# Patient Record
Sex: MALE | Race: WHITE | Employment: PART TIME | URBAN - NONMETROPOLITAN AREA
[De-identification: names, ages, dates, MRNs, and addresses within clinical notes are randomized per-mention and may not be internally consistent; named-entity substitution may affect disease eponyms.]

---

## 2017-03-15 ENCOUNTER — OFFICE VISIT (OUTPATIENT)
Dept: FAMILY MEDICINE CLINIC | Age: 27
End: 2017-03-15

## 2017-03-15 VITALS
BODY MASS INDEX: 44.1 KG/M2 | SYSTOLIC BLOOD PRESSURE: 122 MMHG | HEART RATE: 116 BPM | WEIGHT: 315 LBS | OXYGEN SATURATION: 98 % | HEIGHT: 71 IN | DIASTOLIC BLOOD PRESSURE: 85 MMHG

## 2017-03-15 DIAGNOSIS — J01.00 ACUTE NON-RECURRENT MAXILLARY SINUSITIS: Primary | ICD-10-CM

## 2017-03-15 DIAGNOSIS — F52.32 ANORGASMIA OF MALE: ICD-10-CM

## 2017-03-15 PROCEDURE — 99213 OFFICE O/P EST LOW 20 MIN: CPT | Performed by: NURSE PRACTITIONER

## 2017-03-15 RX ORDER — AMOXICILLIN 500 MG/1
500 CAPSULE ORAL 2 TIMES DAILY
Qty: 20 CAPSULE | Refills: 0 | Status: SHIPPED | OUTPATIENT
Start: 2017-03-15 | End: 2017-03-25

## 2017-03-21 ASSESSMENT — ENCOUNTER SYMPTOMS
EYES NEGATIVE: 1
GASTROINTESTINAL NEGATIVE: 1
RESPIRATORY NEGATIVE: 1

## 2017-04-27 ENCOUNTER — OFFICE VISIT (OUTPATIENT)
Dept: FAMILY MEDICINE CLINIC | Age: 27
End: 2017-04-27

## 2017-04-27 VITALS
HEIGHT: 71 IN | HEART RATE: 113 BPM | BODY MASS INDEX: 44.1 KG/M2 | WEIGHT: 315 LBS | OXYGEN SATURATION: 98 % | DIASTOLIC BLOOD PRESSURE: 110 MMHG | SYSTOLIC BLOOD PRESSURE: 130 MMHG

## 2017-04-27 DIAGNOSIS — R03.0 ELEVATED BLOOD PRESSURE (NOT HYPERTENSION): ICD-10-CM

## 2017-04-27 DIAGNOSIS — E66.01 MORBID OBESITY WITH BMI OF 45.0-49.9, ADULT (HCC): Primary | ICD-10-CM

## 2017-04-27 PROCEDURE — 99212 OFFICE O/P EST SF 10 MIN: CPT | Performed by: NURSE PRACTITIONER

## 2017-04-27 ASSESSMENT — ENCOUNTER SYMPTOMS
RESPIRATORY NEGATIVE: 1
GASTROINTESTINAL NEGATIVE: 1
EYES NEGATIVE: 1

## 2017-04-28 ENCOUNTER — TELEPHONE (OUTPATIENT)
Dept: BARIATRICS/WEIGHT MGMT | Age: 27
End: 2017-04-28

## 2017-10-18 ENCOUNTER — OFFICE VISIT (OUTPATIENT)
Dept: FAMILY MEDICINE CLINIC | Age: 27
End: 2017-10-18

## 2017-10-18 VITALS
HEART RATE: 102 BPM | HEIGHT: 71 IN | SYSTOLIC BLOOD PRESSURE: 116 MMHG | TEMPERATURE: 98.2 F | WEIGHT: 279 LBS | BODY MASS INDEX: 39.06 KG/M2 | RESPIRATION RATE: 18 BRPM | OXYGEN SATURATION: 97 % | DIASTOLIC BLOOD PRESSURE: 82 MMHG

## 2017-10-18 DIAGNOSIS — E66.9 OBESITY (BMI 35.0-39.9 WITHOUT COMORBIDITY): ICD-10-CM

## 2017-10-18 DIAGNOSIS — N39.43 URINARY DRIBBLING: Primary | ICD-10-CM

## 2017-10-18 DIAGNOSIS — Z20.2 STD EXPOSURE: ICD-10-CM

## 2017-10-18 DIAGNOSIS — Z79.899 HIGH RISK MEDICATION USE: ICD-10-CM

## 2017-10-18 DIAGNOSIS — Z72.0 TOBACCO USE: ICD-10-CM

## 2017-10-18 DIAGNOSIS — R39.11 URINARY HESITANCY: ICD-10-CM

## 2017-10-18 DIAGNOSIS — H53.8 BLURRED VISION, BILATERAL: ICD-10-CM

## 2017-10-18 DIAGNOSIS — R73.9 HYPERGLYCEMIA: ICD-10-CM

## 2017-10-18 PROCEDURE — 99173 VISUAL ACUITY SCREEN: CPT | Performed by: NURSE PRACTITIONER

## 2017-10-18 PROCEDURE — 99213 OFFICE O/P EST LOW 20 MIN: CPT | Performed by: NURSE PRACTITIONER

## 2017-10-18 PROCEDURE — 36415 COLL VENOUS BLD VENIPUNCTURE: CPT | Performed by: NURSE PRACTITIONER

## 2017-10-18 RX ORDER — BUSPIRONE HYDROCHLORIDE 5 MG/1
5 TABLET ORAL DAILY
COMMUNITY
Start: 2017-10-18 | End: 2018-08-22

## 2017-10-18 RX ORDER — ASENAPINE MALEATE 5 MG/1
TABLET SUBLINGUAL
COMMUNITY
Start: 2017-09-25 | End: 2018-08-22

## 2017-10-18 ASSESSMENT — PATIENT HEALTH QUESTIONNAIRE - PHQ9
SUM OF ALL RESPONSES TO PHQ9 QUESTIONS 1 & 2: 0
SUM OF ALL RESPONSES TO PHQ QUESTIONS 1-9: 0
1. LITTLE INTEREST OR PLEASURE IN DOING THINGS: 0
2. FEELING DOWN, DEPRESSED OR HOPELESS: 0

## 2017-10-18 ASSESSMENT — ENCOUNTER SYMPTOMS
GASTROINTESTINAL NEGATIVE: 1
RESPIRATORY NEGATIVE: 1
EYES NEGATIVE: 1

## 2017-10-18 NOTE — PROGRESS NOTES
catheter in place. Prior to this event voiding symptoms consisted of hesitancy. Prior treatments include watchful waiting. Recent medications that may have affected his voiding include saphris. Review of Systems   Constitutional: Positive for weight loss. HENT: Negative. Eyes: Negative. Respiratory: Negative. Cardiovascular: Negative. Gastrointestinal: Negative. Genitourinary: Negative. Urinary dribbling, urinary hesitancy and urinary retention   Musculoskeletal: Negative. Skin: Negative. Neurological: Negative. Endo/Heme/Allergies: Negative. Psychiatric/Behavioral: Negative. All other systems reviewed and are negative.        Past Medical History:   Diagnosis Date    ADHD     Adult respiratory distress syndrome ([facility] [address]     ICU     Anxiety     Bipolar disorder     Cognitive disorder     GERD (gastroesophageal reflux disease)     Hypertension     Kidney stones     MR (mental retardation), moderate     Nausea and vomiting     Pneumococcal pneumonia (HCC)     Seizures (HCC)     pt denied any seizures ever     Family History   Problem Relation Age of Onset    Depression Mother     Mental Illness Maternal Uncle     Arthritis Paternal Grandmother      Past Surgical History:   Procedure Laterality Date    MOUTH SURGERY      MOUTH SURGERY       Social History     Social History    Marital status: Single     Spouse name: N/A    Number of children: 0    Years of education: 15     Occupational History    works on farm      Farm work     Social History Main Topics    Smoking status: Heavy Tobacco Smoker     Packs/day: 0.50     Years: 10.00     Types: Cigarettes    Smokeless tobacco: Current User     Types: Chew    Alcohol use 1.2 oz/week     2 Cans of beer per week      Comment: Occasional, \"2x last month\"    Drug use: No      Comment: pot one year ago    Sexual activity: Yes     Partners: Female     Other Topics Concern    Not on file     Social History Narrative    No narrative on file     Current Outpatient Prescriptions   Medication Sig Dispense Refill    SAPHRIS 5 MG SUBL sublingual tablet       busPIRone (BUSPAR) 10 MG tablet Take 2 tablets by mouth 3 times daily 180 tablet 0     No current facility-administered medications for this visit. Objective:       Physical Exam   Constitutional: He is oriented to person, place, and time. He appears well-developed and well-nourished. No distress. Obese   HENT:   Head: Normocephalic and atraumatic. Right Ear: Tympanic membrane, external ear and ear canal normal.   Left Ear: Tympanic membrane, external ear and ear canal normal.   Nose: Nose normal.   Mouth/Throat: Oropharynx is clear and moist and mucous membranes are normal.   Eyes: Conjunctivae, EOM and lids are normal. Pupils are equal, round, and reactive to light. Neck: Normal range of motion. Neck supple. No JVD present. Carotid bruit is not present. No thyromegaly present. Cardiovascular: Normal rate, regular rhythm, normal heart sounds and normal pulses. Exam reveals no gallop and no friction rub. No murmur heard. Pulses:       Radial pulses are 2+ on the right side, and 2+ on the left side. Dorsalis pedis pulses are 2+ on the right side, and 2+ on the left side. Posterior tibial pulses are 2+ on the right side, and 2+ on the left side. Pulmonary/Chest: Effort normal and breath sounds normal.   Abdominal: Soft. Normal appearance and bowel sounds are normal. He exhibits no mass. There is no hepatosplenomegaly. There is no tenderness. Musculoskeletal: Normal range of motion. He exhibits no edema.   + right knee crepitus     Lymphadenopathy:        Head (right side): No submandibular adenopathy present. Head (left side): No submandibular adenopathy present. He has no cervical adenopathy. Neurological: He is alert and oriented to person, place, and time. He has normal strength.  Gait normal.   Skin: Skin is warm, dry and intact. No lesion and no rash noted. Psychiatric: He has a normal mood and affect. His speech is normal and behavior is normal. Judgment and thought content normal. Cognition and memory are normal.   Nursing note and vitals reviewed. /82 (Site: Left Arm, Position: Sitting, Cuff Size: Large Adult)   Pulse 102   Temp 98.2 °F (36.8 °C) (Temporal)   Resp 18   Ht 5' 10.98\" (1.803 m)   Wt 279 lb (126.6 kg)   SpO2 97%   BMI 38.93 kg/m²   Body mass index is 38.93 kg/m². BP Readings from Last 2 Encounters:   10/18/17 116/82   04/27/17 (!) 130/110       Wt Readings from Last 3 Encounters:   10/18/17 279 lb (126.6 kg)   04/27/17 (!) 327 lb 12.8 oz (148.7 kg)   03/15/17 (!) 328 lb 3.2 oz (148.9 kg)       Lab Review   No visits with results within 2 Month(s) from this visit.    Latest known visit with results is:   Admission on 02/03/2017, Discharged on 02/03/2017   Component Date Value    Ventricular Rate 02/07/2017 104     Atrial Rate 02/07/2017 104     P-R Interval 02/07/2017 136     QRS Duration 02/07/2017 78     Q-T Interval 02/07/2017 334     QTc Calculation (Bazett) 02/07/2017 439     P Axis 02/07/2017 55     R Axis 02/07/2017 92     T Axis 02/07/2017 -12     Diagnosis 02/07/2017                      Value:Sinus tachycardia  Rightward axis  Nonspecific T wave abnormality  Abnormal ECG  When compared with ECG of 11-MAY-2016 14:54,  Nonspecific T wave abnormality  Confirmed by [facility] LITTLEJOHN MD ([id number]) on 2/4/2017 8:12:56 AM      Amphetamine Screen, Urine 02/03/2017 Neg     Barbiturate Screen, Ur 02/03/2017 Neg     Benzodiazepine Screen, U* 02/03/2017 Neg     Cannabinoid Scrn, Ur 02/03/2017 Neg     COCAINE METABOLITE SCREE* 02/03/2017 Neg     Opiate Scrn, Ur 02/03/2017 Neg     PCP Scrn, Ur 02/03/2017 Neg     Methadone Screen, Urine 02/03/2017 Neg     Propoxyphene Scrn, Ur 02/03/2017 Neg     pH, UA 02/03/2017 7.5     Drug Screen Comment: 02/03/2017 see below     acupuncture are considered. The patient is asked to consider setting a firm date to stop smoking/using tobacco products. Printed references are provided to the patient. High risk medication use  -     Comprehensive Metabolic Panel          Patient has been instructed call the office immediately with new symptoms, change in symptoms or worsening of symptoms. If this is not feasible, patient is instructed to report to the emergency room. Medication profile reviewed. Medication side effects and possible impairments from medications were discussed as applicable. Allergies were reviewed. Health maintenance was reviewed and updated as appropriate.

## 2017-10-19 LAB
A/G RATIO: 1.5 (ref 1.1–2.2)
ALBUMIN SERPL-MCNC: 4.6 G/DL (ref 3.4–5)
ALP BLD-CCNC: 73 U/L (ref 40–129)
ALT SERPL-CCNC: 47 U/L (ref 10–40)
ANION GAP SERPL CALCULATED.3IONS-SCNC: 20 MMOL/L (ref 3–16)
AST SERPL-CCNC: 52 U/L (ref 15–37)
BILIRUB SERPL-MCNC: 1.8 MG/DL (ref 0–1)
BUN BLDV-MCNC: 14 MG/DL (ref 7–20)
CALCIUM SERPL-MCNC: 9.9 MG/DL (ref 8.3–10.6)
CHLORIDE BLD-SCNC: 101 MMOL/L (ref 99–110)
CO2: 22 MMOL/L (ref 21–32)
CREAT SERPL-MCNC: 0.9 MG/DL (ref 0.9–1.3)
ESTIMATED AVERAGE GLUCOSE: 93.9 MG/DL
GFR AFRICAN AMERICAN: >60
GFR NON-AFRICAN AMERICAN: >60
GLOBULIN: 3.1 G/DL
GLUCOSE BLD-MCNC: 78 MG/DL (ref 70–99)
HBA1C MFR BLD: 4.9 %
POTASSIUM SERPL-SCNC: 4.2 MMOL/L (ref 3.5–5.1)
SODIUM BLD-SCNC: 143 MMOL/L (ref 136–145)
TOTAL PROTEIN: 7.7 G/DL (ref 6.4–8.2)
TSH SERPL DL<=0.05 MIU/L-ACNC: 1.1 UIU/ML (ref 0.27–4.2)

## 2017-10-20 ENCOUNTER — TELEPHONE (OUTPATIENT)
Dept: FAMILY MEDICINE CLINIC | Age: 27
End: 2017-10-20

## 2017-11-02 ENCOUNTER — TELEPHONE (OUTPATIENT)
Dept: FAMILY MEDICINE CLINIC | Age: 27
End: 2017-11-02

## 2017-11-02 NOTE — TELEPHONE ENCOUNTER
Patient can be put in for an acute appointment next week for ankle pain only.  Educated patient and I will not be able to address other concerns at this visit

## 2017-11-06 ENCOUNTER — OFFICE VISIT (OUTPATIENT)
Dept: FAMILY MEDICINE CLINIC | Age: 27
End: 2017-11-06

## 2017-11-06 VITALS
BODY MASS INDEX: 38.39 KG/M2 | OXYGEN SATURATION: 98 % | WEIGHT: 268.2 LBS | SYSTOLIC BLOOD PRESSURE: 130 MMHG | HEIGHT: 70 IN | DIASTOLIC BLOOD PRESSURE: 80 MMHG | HEART RATE: 94 BPM

## 2017-11-06 DIAGNOSIS — G89.29 CHRONIC PAIN OF RIGHT ANKLE: Primary | ICD-10-CM

## 2017-11-06 DIAGNOSIS — R29.898 ANKLE WEAKNESS: ICD-10-CM

## 2017-11-06 DIAGNOSIS — M25.571 CHRONIC PAIN OF RIGHT ANKLE: Primary | ICD-10-CM

## 2017-11-06 DIAGNOSIS — M25.471 RIGHT ANKLE SWELLING: ICD-10-CM

## 2017-11-06 DIAGNOSIS — Z71.6 TOBACCO ABUSE COUNSELING: ICD-10-CM

## 2017-11-06 PROCEDURE — 99213 OFFICE O/P EST LOW 20 MIN: CPT | Performed by: NURSE PRACTITIONER

## 2017-11-06 ASSESSMENT — ENCOUNTER SYMPTOMS
COLOR CHANGE: 1
RESPIRATORY NEGATIVE: 1

## 2017-11-06 NOTE — PROGRESS NOTES
swelling (Right ankle) and myalgias (Right ankle). Negative for neck pain and neck stiffness. Skin: Positive for color change (Ecchymosis to right ankle). Negative for pallor, rash and wound. Neurological: Negative for tingling and numbness. Objective:     Vitals:    11/06/17 1023   BP: 130/80   Site: Left Arm   Position: Sitting   Cuff Size: Large Adult   Pulse: 94   SpO2: 98%   Weight: 268 lb 3.2 oz (121.7 kg)   Height: 5' 10\" (1.778 m)     Physical Exam   Constitutional: He is oriented to person, place, and time. He appears well-developed and well-nourished. No distress. HENT:   Head: Normocephalic and atraumatic. Right Ear: External ear normal.   Left Ear: External ear normal.   Nose: Nose normal.   Mouth/Throat: Oropharynx is clear and moist.   Eyes: Conjunctivae and EOM are normal. Pupils are equal, round, and reactive to light. Right eye exhibits no discharge. Left eye exhibits no discharge. Neck: Normal range of motion. Neck supple. No tracheal deviation present. Cardiovascular: Normal rate, regular rhythm, normal heart sounds and intact distal pulses. Exam reveals no gallop and no friction rub. No murmur heard. Pulmonary/Chest: Effort normal and breath sounds normal. No respiratory distress. He has no wheezes. He has no rales. He exhibits no tenderness. Abdominal: Soft. Bowel sounds are normal. He exhibits no distension and no mass. There is no tenderness. There is no rebound and no guarding. Musculoskeletal: He exhibits no edema or deformity. Right ankle: He exhibits decreased range of motion, swelling (Mild) and ecchymosis (Mild). Tenderness. Lateral malleolus and medial malleolus tenderness found. Achilles tendon exhibits no pain, no defect and normal Phillip's test results. Bilateral pedal pulses +2. Bilateral lower legs warm and dry. Lymphadenopathy:     He has no cervical adenopathy. Neurological: He is alert and oriented to person, place, and time. Skin: Skin is warm and dry. No rash noted. He is not diaphoretic. Psychiatric: He has a normal mood and affect. His behavior is normal. Judgment and thought content normal.   Nursing note and vitals reviewed. Office Visit on 10/18/2017   Component Date Value Ref Range Status    Sodium 10/19/2017 143  136 - 145 mmol/L Final    Potassium 10/19/2017 4.2  3.5 - 5.1 mmol/L Final    Chloride 10/19/2017 101  99 - 110 mmol/L Final    CO2 10/19/2017 22  21 - 32 mmol/L Final    Anion Gap 10/19/2017 20* 3 - 16 Final    Glucose 10/19/2017 78  70 - 99 mg/dL Final    BUN 10/19/2017 14  7 - 20 mg/dL Final    CREATININE 10/19/2017 0.9  0.9 - 1.3 mg/dL Final    GFR Non- 10/19/2017 >60  >60 Final    Comment: >60 mL/min/1.73m2 EGFR, calc. for ages 25 and older using the  MDRD formula (not corrected for weight), is valid for stable  renal function.  GFR  10/19/2017 >60  >60 Final    Comment: Chronic Kidney Disease: less than 60 ml/min/1.73 sq.m. Kidney Failure: less than 15 ml/min/1.73 sq.m. Results valid for patients 18 years and older.  Calcium 10/19/2017 9.9  8.3 - 10.6 mg/dL Final    Total Protein 10/19/2017 7.7  6.4 - 8.2 g/dL Final    Alb 10/19/2017 4.6  3.4 - 5.0 g/dL Final    Albumin/Globulin Ratio 10/19/2017 1.5  1.1 - 2.2 Final    Total Bilirubin 10/19/2017 1.8* 0.0 - 1.0 mg/dL Final    Alkaline Phosphatase 10/19/2017 73  40 - 129 U/L Final    ALT 10/19/2017 47* 10 - 40 U/L Final    AST 10/19/2017 52* 15 - 37 U/L Final    Globulin 10/19/2017 3.1  g/dL Final    TSH 10/19/2017 1.10  0.27 - 4.20 uIU/mL Final    Hemoglobin A1C 10/19/2017 4.9  See comment % Final    Comment: Comment:  Diagnosis of Diabetes: > or = 6.5%  Increased risk of diabetes (Prediabetes): 5.7-6.4%  Glycemic Control: Nonpregnant Adults: <7.0%                    Pregnant: <6.0%        eAG 10/19/2017 93.9  mg/dL Final           Assessment & Plan:      The following diagnoses and improve with each attempt     2)                   Encourage all other occupants in the house to quit     3)                   Remove all tobacco products from home, work, and vehicles     4)                   Tobacco cessation aids such as nicotine replacement therapy options    It is very important that he quit smoking and chewing snuff. There are various alternatives available to help with this difficult task, but first and foremost, he must make a firm commitment and decision to quit. The nature of nicotine addiction is discussed. The usefulness of behavioral therapy is discussed and suggested. The correct use, cost and side effects of nicotine replacement therapy such as gum or patches is discussed. Bupropion and its cost (sometimes not covered fully by insurance) and side effects are reviewed. The quit rates are discussed. I recommend he not allow potential costs of treatment to deter him from using nicotine replacement therapy or bupropion, as the long term economic and health benefits are obvious.

## 2017-11-06 NOTE — PATIENT INSTRUCTIONS
Patient Education        Ankle Sprain: Care Instructions  Your Care Instructions    An ankle sprain can happen when you twist your ankle. The ligaments that support the ankle can get stretched and torn. Often the ankle is swollen and painful. Ankle sprains may take from several weeks to several months to heal. Usually, the more pain and swelling you have, the more severe your ankle sprain is and the longer it will take to heal. You can heal faster and regain strength in your ankle with good home treatment. It is very important to give your ankle time to heal completely, so that you do not easily hurt your ankle again. Follow-up care is a key part of your treatment and safety. Be sure to make and go to all appointments, and call your doctor if you are having problems. It's also a good idea to know your test results and keep a list of the medicines you take. How can you care for yourself at home? · Prop up your foot on pillows as much as possible for the next 3 days. Try to keep your ankle above the level of your heart. This will help reduce the swelling. · Follow your doctor's directions for wearing a splint or elastic bandage. Wrapping the ankle may help reduce or prevent swelling. · Your doctor may give you a splint, a brace, an air stirrup, or another form of ankle support to protect your ankle until it is healed. Wear it as directed while your ankle is healing. Do not remove it unless your doctor tells you to. After your ankle has healed, ask your doctor whether you should wear the brace when you exercise. · Put ice or cold packs on your injured ankle for 10 to 20 minutes at a time. Try to do this every 1 to 2 hours for the next 3 days (when you are awake) or until the swelling goes down. Put a thin cloth between the ice and your skin. · You may need to use crutches until you can walk without pain. If you do use crutches, try to bear some weight on your injured ankle if you can do so without pain.  This helps the ankle heal.  · Take pain medicines exactly as directed. ¨ If the doctor gave you a prescription medicine for pain, take it as prescribed. ¨ If you are not taking a prescription pain medicine, ask your doctor if you can take an over-the-counter medicine. · If you have been given ankle exercises to do at home, do them exactly as instructed. These can promote healing and help prevent lasting weakness. When should you call for help? Call your doctor now or seek immediate medical care if:  · Your pain is getting worse. · Your swelling is getting worse. · Your splint feels too tight or you are unable to loosen it. Watch closely for changes in your health, and be sure to contact your doctor if:  · You are not getting better after 1 week. Where can you learn more? Go to [email or web address]://Danal [email or web address]/a [employer].Moonfrye. org and sign in to your [employer] account. Enter U294 in the Tinypass box to learn more about \"Ankle Sprain: Care Instructions. \"     If you do not have an account, please click on the \"Sign Up Now\" link. Current as of: March 21, 2017  Content Version: 11.3  © 0611-0286 [employer]. Care instructions adapted under license by [facility] ([facility]). If you have questions about a medical condition or this instruction, always ask your healthcare professional. [address] any warranty or liability for your use of this information.

## 2017-11-08 ENCOUNTER — TELEPHONE (OUTPATIENT)
Dept: FAMILY MEDICINE CLINIC | Age: 27
End: 2017-11-08

## 2017-11-08 NOTE — TELEPHONE ENCOUNTER
Sandra Aguilera was informed by the ER physician to get an air cast for his ankle sprain. Sandra Aguilera never purchased an air cast.  The ER physician informed him to take NSAIDS as needed for his ankle sprain. I do not think that a muscle relaxer would improve his pain as he has an ankle sprain. He needs to remember RICE- rest, ice, compression, elevate. He was given ace wraps in the office to compress his ankle. He should continue to use the ace wraps. He is going to have pain if he does not rest his ankle and he told me in the office he was walking and running daily. He may take ibuprofen and tylenol as needed for pain. He may alternate between the two.

## 2017-11-08 NOTE — TELEPHONE ENCOUNTER
Noted.  Muscle relaxers are not indicated for right ankle sprain.   He should follow up with Dr. Marcella Osman and perform RICE

## 2017-11-30 ENCOUNTER — OFFICE VISIT (OUTPATIENT)
Dept: ORTHOPEDIC SURGERY | Age: 27
End: 2017-11-30

## 2017-11-30 VITALS
DIASTOLIC BLOOD PRESSURE: 82 MMHG | WEIGHT: 268.3 LBS | HEIGHT: 70 IN | HEART RATE: 66 BPM | BODY MASS INDEX: 38.41 KG/M2 | SYSTOLIC BLOOD PRESSURE: 138 MMHG

## 2017-11-30 DIAGNOSIS — S62.663A CLOSED NONDISPLACED FRACTURE OF DISTAL PHALANX OF LEFT MIDDLE FINGER, INITIAL ENCOUNTER: Primary | ICD-10-CM

## 2017-11-30 DIAGNOSIS — M25.571 CHRONIC PAIN OF RIGHT ANKLE: ICD-10-CM

## 2017-11-30 DIAGNOSIS — M54.40 ACUTE RIGHT-SIDED LOW BACK PAIN WITH SCIATICA, SCIATICA LATERALITY UNSPECIFIED: ICD-10-CM

## 2017-11-30 DIAGNOSIS — G89.29 CHRONIC PAIN OF RIGHT ANKLE: ICD-10-CM

## 2017-11-30 PROCEDURE — G8427 DOCREV CUR MEDS BY ELIG CLIN: HCPCS | Performed by: ORTHOPAEDIC SURGERY

## 2017-11-30 PROCEDURE — G8417 CALC BMI ABV UP PARAM F/U: HCPCS | Performed by: ORTHOPAEDIC SURGERY

## 2017-11-30 PROCEDURE — 99244 OFF/OP CNSLTJ NEW/EST MOD 40: CPT | Performed by: ORTHOPAEDIC SURGERY

## 2017-11-30 PROCEDURE — 26750 TREAT FINGER FRACTURE EACH: CPT | Performed by: ORTHOPAEDIC SURGERY

## 2017-11-30 PROCEDURE — G8484 FLU IMMUNIZE NO ADMIN: HCPCS | Performed by: ORTHOPAEDIC SURGERY

## 2017-11-30 PROCEDURE — 73600 X-RAY EXAM OF ANKLE: CPT | Performed by: ORTHOPAEDIC SURGERY

## 2017-11-30 PROCEDURE — L1902 AFO ANKLE GAUNTLET PRE OTS: HCPCS | Performed by: ORTHOPAEDIC SURGERY

## 2017-11-30 PROCEDURE — 72100 X-RAY EXAM L-S SPINE 2/3 VWS: CPT | Performed by: ORTHOPAEDIC SURGERY

## 2017-11-30 RX ORDER — MELOXICAM 15 MG/1
15 TABLET ORAL DAILY
Qty: 30 TABLET | Refills: 3 | Status: SHIPPED | OUTPATIENT
Start: 2017-11-30 | End: 2018-08-20

## 2017-11-30 RX ORDER — HYDROCODONE BITARTRATE AND ACETAMINOPHEN 5; 325 MG/1; MG/1
1 TABLET ORAL EVERY 6 HOURS PRN
Qty: 28 TABLET | Refills: 0 | Status: SHIPPED | OUTPATIENT
Start: 2017-11-30 | End: 2017-12-07

## 2017-11-30 NOTE — PROGRESS NOTES
fINGER INJURY VISIT      HISTORY OF PRESENT ILLNESS    Leobardo Antony is a 33 y.o. male who presents for consultation at the request of Caridad Jack CNP,  for evaluation of a left long finger injury when he got smashed working at a [employer] yard. He says he had a fracture was referred here from the splint. It was bleeding but has stopped. He'll also complains of chronic right ankle pain that he had injured some time ago in also complains of chronic low back pain. This had for several years after fall. His multiple medical comorbidities, mostly of psychological etiology. ROS    Well-documented in the patient. Treatment form dated 11/30/17  All other ROS negative except for above. Past Surgical history    Past Surgical History:   Procedure Laterality Date    MOUTH SURGERY      MOUTH SURGERY         PAST MEDICAL    Past Medical History:   Diagnosis Date    ADHD     Adult respiratory distress syndrome ([address]     ICU     Anxiety     Bipolar disorder     Cognitive disorder     Finger fracture, left 11/22/2017    GERD (gastroesophageal reflux disease)     Hypertension     Kidney stones     MR (mental retardation), moderate     Nausea and vomiting     Pneumococcal pneumonia (HCC)     Seizures (HCC)     pt denied any seizures ever       Allergies    No Known Allergies    Meds    Current Outpatient Prescriptions   Medication Sig Dispense Refill    meloxicam (MOBIC) 15 MG tablet Take 1 tablet by mouth daily 30 tablet 3    HYDROcodone-acetaminophen (NORCO) 5-325 MG per tablet Take 1 tablet by mouth every 6 hours as needed for Pain . 28 tablet 0    SAPHRIS 5 MG SUBL sublingual tablet       busPIRone (BUSPAR) 5 MG tablet Take 1 tablet by mouth daily       No current facility-administered medications for this visit.         Social    Social History     Social History    Marital status: Single     Spouse name: N/A    Number of children: 0    Years of education: 12     Occupational History    works on farm      [employer] work     Social History Main Topics    Smoking status: Heavy Tobacco Smoker     Packs/day: 0.50     Years: 10.00     Types: Cigarettes    Smokeless tobacco: Current User     Types: Chew    Alcohol use 1.2 oz/week     2 Cans of beer per week      Comment: Occasional, \"2x last month\"    Drug use: No      Comment: pot one year ago    Sexual activity: Yes     Partners: Female     Other Topics Concern    Not on file     Social History Narrative    No narrative on file       Family HISTORY    Family History   Problem Relation Age of Onset    Depression Mother     Mental Illness Maternal Uncle     Arthritis Paternal Grandmother        PHYSICAL EXAM    Vital Signs:  /82   Pulse 66   Ht 5' 10\" (1.778 m)   Wt 268 lb 4.8 oz (121.7 kg)   BMI 38.50 kg/m²   General Appearance:  Obese body habitus. Alert and oriented to person, place, and time. Affect:  His affect is somewhat flat and may be associated with his autistic spectrum disorder or bipolar disorder. .   Gait:  Normal. Good balance and coordination. Reflexes:  Intact. Sensation:  Normal.   Pulses:  Normal.   Skin:  Normal with the exception of left long finger, which is swollen over the nail, but there is no evidence of some ungual hematoma. At this time. Left Hand Exam:    Hand dominance - right  Finger Exam - left long finger swollen and tender as noted above. Axial and rotational alignment is normal.  Sensory Exam - normal symmetric both upper and lower extremities. On exam to his right ankle, he has some moderate instability to anterior drawer and talar tilt. He has no crepitus or swelling and no discoloration. On x-ray to his low back. He has good lumbar range of motion with no palpable defects. He has localized tenderness to the left lumbosacral junction but no sciatic notch tenderness.   He has normal and symmetric sensation motor function reflexes and negative straight leg raise and negative Jayshree. Finger   Range of Motion Right Left   DIP Extension     DIP Flexion     PIP Extension     CMC     PIP Flexion     MP Extension     MP Flexion       IMAGING STUDIES    X-rays 2 views of the left long finger reveals tuft fracture in good position  X-rays 2 views of the right ankle are unremarkable for acute or chronic pathology  X-rays 2 views of the lumbar spine are normal in appearance with the exception of loss of normal lordosis. IMPRESSION    Left long finger tuft fracture  Chronic low back pain  Right ankle instability, chronic    PLAN    At this time I recommend right ankle brace, a Stax splint for left long finger, and physical therapy for his back and ankle. I will give him some medication for inflammation and pain with acute pain in his left long finger    An [facility] report was run. The results were reviewed. The results of this report were used in formulating a treatment plan for this patient. Report was Kept confidential and disposed of appropriately.

## 2017-12-01 ENCOUNTER — HOSPITAL ENCOUNTER (OUTPATIENT)
Dept: PHYSICAL THERAPY | Age: 27
Discharge: HOME | End: 2017-12-31
Attending: ORTHOPAEDIC SURGERY | Admitting: ORTHOPAEDIC SURGERY

## 2017-12-06 ENCOUNTER — HOSPITAL ENCOUNTER (OUTPATIENT)
Dept: PHYSICAL THERAPY | Age: 27
Discharge: HOME | End: 2017-12-06
Admitting: ORTHOPAEDIC SURGERY

## 2017-12-06 ENCOUNTER — HOSPITAL ENCOUNTER (OUTPATIENT)
Dept: PHYSICAL THERAPY | Age: 27
Discharge: HOME | End: 2017-11-30
Admitting: ORTHOPAEDIC SURGERY

## 2017-12-06 DIAGNOSIS — M25.571 CHRONIC PAIN OF RIGHT ANKLE: Primary | ICD-10-CM

## 2017-12-06 DIAGNOSIS — G89.29 CHRONIC PAIN OF RIGHT ANKLE: Primary | ICD-10-CM

## 2017-12-06 RX ORDER — HYDROCODONE BITARTRATE AND ACETAMINOPHEN 5; 325 MG/1; MG/1
TABLET ORAL
Qty: 10 TABLET | Refills: 0 | Status: SHIPPED | OUTPATIENT
Start: 2017-12-06 | End: 2018-01-12 | Stop reason: ALTCHOICE

## 2017-12-06 NOTE — PLAN OF CARE
restricted  Changing and Maintaining Body Position Goal Status (): 0 percent impaired, limited or restricted    Pain Scale: 8/10  Easing factors: rest, icy hot as needed. 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    Living Status/Prior Level of Function: Independent with ADLs and IADLs    OBJECTIVE:     ROM LEFT RIGHT   HIP Flex 115 °  110 °    HIP Abd 45 °  45 °    HIP Ext NT NT   HIP IR 15 °  10 °    HIP ER 45 °  40 °    Lumbar flexion 100 % 100 %   Lumbar extension 100 % 100 %        90/90 HSS Lacking 15 °  Lacking 15 °              Strength  LEFT RIGHT   HIP Flexors 27 lbs 28 lbs   HIP Abductors 22.5 lbs 26.2 lbs   HIP Ext     Hip ER     Knee EXT (quad)     Knee Flex (HS)     Ankle DF     Ankle PF     Ankle Inv     Ankle EV          Circumference             Reflexes/Sensation:    []Dermatomes/Myotomes intact    []Reflexes equal and normal bilaterally   []Other:    Joint mobility:   [x]Normal    []Hypo   []Hyper    Palpation: No pain to touch    Functional Mobility/Transfers: WNL    Posture: WNL    Bandages/Dressings/Incisions: na    Gait: (include devices/WB status) WNL    Orthopedic Special Tests: Lumbar instability test was (-)                       [x] Patient history, allergies, meds reviewed. Medical chart reviewed. See intake form. Review Of Systems (ROS):  [x]Performed Review of systems (Integumentary, CardioPulmonary, Neurological) by intake and observation. Intake form has been scanned into medical record. Patient has been instructed to contact their primary care physician regarding ROS issues if not already being addressed at this time.       Co-morbidities/Complexities (which will affect course of rehabilitation):   [x]None           Arthritic conditions   []Rheumatoid arthritis (M05.9)  []Osteoarthritis strength   [x]Reduced balance/proprioceptive control   []other:      Functional Activity Limitations (from functional questionnaire and intake)   []Reduced ability to tolerate prolonged functional positions   []Reduced ability or difficulty with changes of positions or transfers between positions   []Reduced ability to maintain good posture and demonstrate good body mechanics with sitting, bending, and lifting   []Reduced ability to sleep   [] Reduced ability or tolerance with driving and/or computer work   [x]Reduced ability to perform lifting, carrying tasks   [x]Reduced ability to squat   [x]Reduced ability to forward bend   [x]Reduced ability to ambulate prolonged functional periods/distances/surfaces   [x]Reduced ability to ascend/descend stairs   [x]Reduced ability to run, hop, cut or jump   []other:    Participation Restrictions   []Reduced participation in self care activities   []Reduced participation in home management activities   [x]Reduced participation in work activities   [x]Reduced participation in social activities. [x]Reduced participation in sport/recreation activities. Classification :    []Signs/symptoms consistent with post-surgical status including decreased ROM, strength and function.    []Signs/symptoms consistent with joint sprain/strain   []Signs/symptoms consistent with patella-femoral syndrome   []Signs/symptoms consistent with knee OA/hip OA   []Signs/symptoms consistent with internal derangement of knee/Hip   []Signs/symptoms consistent with functional hip weakness/NMR control      []Signs/symptoms consistent with tendinitis/tendinosis    []signs/symptoms consistent with pathology which may benefit from Dry needling      [x]other:  Lumbar stabilization / centralization     Prognosis/Rehab Potential:      []Excellent   [x]Good    []Fair   []Poor    Tolerance of evaluation/treatment:    []Excellent   [x]Good    []Fair   []Poor    Physical Therapy Evaluation Complexity Justification [x] A history of present problem with:  [] no personal factors and/or comorbidities that impact the plan of care;  [x]1-2 personal factors and/or comorbidities that impact the plan of care  []3 personal factors and/or comorbidities that impact the plan of care  [x] An examination of body systems using standardized tests and measures addressing any of the following: body structures and functions (impairments), activity limitations, and/or participation restrictions;:  [] a total of 1-2 or more elements   [x] a total of 3 or more elements   [] a total of 4 or more elements   [x] A clinical presentation with:  [x] stable and/or uncomplicated characteristics   [] evolving clinical presentation with changing characteristics  [] unstable and unpredictable characteristics;   [x] Clinical decision making of [x] low, [] moderate, [] high complexity using standardized patient assessment instrument and/or measurable assessment of functional outcome. [x] EVAL (LOW) 96202 (typically 20 minutes face-to-face)  [] EVAL (MOD) 04076 (typically 30 minutes face-to-face)  [] EVAL (HIGH) 17169 (typically 45 minutes face-to-face)  [] RE-EVAL     PLAN  Frequency/Duration:  1-2 days per week for 12 weeks:  Interventions:  1. Therapeutic exercise including: strength training, ROM/flexibility, NMR and proprioception for the proximal hip, core and Lower extremity  2. Manual therapy as indicated including Dry Needling/IASTM, STM, PROM, Gr I-IV mobilizations, spinal mobilization/manipulation. 3. Modalities as needed including: thermal agents, E-stim, US, iontophoresis as indicated. 4. Patient education on joint protection, activity modification, progression of HEP. HEP instruction: (see scanned forms)    GOALS:    Therapist goals for Patient:   Short Term Goals: To be achieved in: 2 weeks  1. Independent in HEP and progression per patient tolerance, in order to prevent re-injury.    2. Patient will have a decrease in pain to

## 2017-12-06 NOTE — FLOWSHEET NOTE
[facility]    Physical Therapy Daily Treatment Note  Date:  2017    Patient Name:  Juan Curry    :  1990  MRN: [id number]  Medical/Treatment Diagnosis Information:  · Diagnosis: Low back pain  · Treatment Diagnosis: S65.22  Insurance/Certification information:  PT Insurance Information: [employer]  Physician Information:  Referring Practitioner: El Ron of care signed (Y/N):     Date of Patient follow up with Physician:     G-Code (if applicable):      Date G-Code Applied:  2017  PT G-Codes  Functional Assessment Tool Used: Oswestry  Score: 28%  Functional Limitation: Changing and maintaining body position  Changing and Maintaining Body Position Current Status ():  At least 20 percent but less than 40 percent impaired, limited or restricted  Changing and Maintaining Body Position Goal Status (): 0 percent impaired, limited or restricted    Progress Note: [x]  Yes  []  No      Latex Allergy:  [x]NO      []YES  Preferred Language for Healthcare:   [x]English       []other:    Visit # Insurance Allowable   1 30     Pain level:  8/10     SUBJECTIVE:  See eval    OBJECTIVE: See eval  Observation:   Test measurements:    Patient educated on following:    RESTRICTIONS/PRECAUTIONS:     Exercises/Interventions:   Therapeutic Ex Wt/Sets/Reps/Hold Notes   Bike 5 min    Eliptical                    Prone elbow prop 2 min    Prone HSC x30 ea    Prone SLR x30 ea    Prone press up x20    Hand heel rock x20    On all fours mad/fat cat x20    On all fours SLR x20 ea                                            Manual     Gr I-II mobs for tissue reactivity     PROM-all planes     GR III-IV mobs for arthrokinematics     Lumbar/long axis distraction     Thoracic PA mobs     PNF for strengthening     PNF for agonist/antagonist inhibition          Pt education/reminders 10 min Provided biomechanics/ergonomics training to reduce stress across Other:     GOALS:  Therapist goals for Patient:   Short Term Goals: To be achieved in: 2 weeks  1. Independent in HEP and progression per patient tolerance, in order to prevent re-injury. 2. Patient will have a decrease in pain to facilitate improvement in movement, function, and ADLs as indicated by Functional Deficits.     Long Term Goals: To be achieved in: 12 weeks  1. Disability index score of 10% or less for the LEFS to assist with reaching prior level of function. 2. Patient will demonstrate increased AROM to equal the opposite side bilaterally to allow for proper joint functioning as indicated by patients Functional Deficits. 3. Patient will demonstrate an increase in strength to LE MMT scores to match bilaterally to allow for proper functional mobility as indicated by patients Functional Deficits. 4. Patient will return to all transfers, work activities, and functional activities without increased symptoms or restriction. 5. Patient will have 0/10 pain with ADL's.  6. Patient stated goal: Return to [address] [place] [address] fighting       Goals that are underlined signify the goal has been accomplished. Progression Towards Functional goals:  [] Patient is progressing as expected towards functional goals listed. [] Progression is slowed due to complexities listed. [] Progression has been slowed due to co-morbidities.   [x] Plan just implemented, too soon to assess goals progression  [] Other:     ASSESSMENT:  See eval    Treatment/Activity Tolerance:   [x] Patient tolerated treatment well [] Patient limited by fatique  [] Patient limited by pain  [] Patient limited by other medical complications  [] Other:     Prognosis: [] Good [] Fair  [] Poor    Patient Requires Follow-up: [x] Yes  [] No    PLAN: See eval  [] Continue per plan of care [] Alter current plan (see comments)  [x] Plan of care initiated [] Hold pending MD visit [] Discharge    Electronically signed by: Lauren Max PT

## 2017-12-18 ENCOUNTER — TELEPHONE (OUTPATIENT)
Dept: ORTHOPEDIC SURGERY | Age: 27
End: 2017-12-18

## 2017-12-18 DIAGNOSIS — S62.663A CLOSED NONDISPLACED FRACTURE OF DISTAL PHALANX OF LEFT MIDDLE FINGER, INITIAL ENCOUNTER: ICD-10-CM

## 2017-12-18 DIAGNOSIS — M25.571 CHRONIC PAIN OF RIGHT ANKLE: ICD-10-CM

## 2017-12-18 DIAGNOSIS — M54.40 ACUTE RIGHT-SIDED LOW BACK PAIN WITH SCIATICA, SCIATICA LATERALITY UNSPECIFIED: ICD-10-CM

## 2017-12-18 DIAGNOSIS — G89.29 CHRONIC PAIN OF RIGHT ANKLE: ICD-10-CM

## 2017-12-19 RX ORDER — HYDROCODONE BITARTRATE AND ACETAMINOPHEN 5; 325 MG/1; MG/1
1 TABLET ORAL EVERY 6 HOURS PRN
Qty: 28 TABLET | Refills: 0 | OUTPATIENT
Start: 2017-12-19 | End: 2017-12-26

## 2017-12-21 ENCOUNTER — OFFICE VISIT (OUTPATIENT)
Dept: ORTHOPEDIC SURGERY | Age: 27
End: 2017-12-21

## 2017-12-21 ENCOUNTER — HOSPITAL ENCOUNTER (OUTPATIENT)
Dept: PHYSICAL THERAPY | Age: 27
Discharge: HOME | End: 2017-12-21
Admitting: ORTHOPAEDIC SURGERY

## 2017-12-21 VITALS — BODY MASS INDEX: 38.41 KG/M2 | HEIGHT: 70 IN | WEIGHT: 268.3 LBS

## 2017-12-21 DIAGNOSIS — M54.40 ACUTE RIGHT-SIDED LOW BACK PAIN WITH SCIATICA, SCIATICA LATERALITY UNSPECIFIED: Primary | ICD-10-CM

## 2017-12-21 PROCEDURE — G8417 CALC BMI ABV UP PARAM F/U: HCPCS | Performed by: ORTHOPAEDIC SURGERY

## 2017-12-21 PROCEDURE — G8427 DOCREV CUR MEDS BY ELIG CLIN: HCPCS | Performed by: ORTHOPAEDIC SURGERY

## 2017-12-21 PROCEDURE — G8484 FLU IMMUNIZE NO ADMIN: HCPCS | Performed by: ORTHOPAEDIC SURGERY

## 2017-12-21 PROCEDURE — 4004F PT TOBACCO SCREEN RCVD TLK: CPT | Performed by: ORTHOPAEDIC SURGERY

## 2017-12-21 PROCEDURE — 99213 OFFICE O/P EST LOW 20 MIN: CPT | Performed by: ORTHOPAEDIC SURGERY

## 2017-12-21 RX ORDER — HYDROCODONE BITARTRATE AND ACETAMINOPHEN 5; 325 MG/1; MG/1
1 TABLET ORAL EVERY 4 HOURS PRN
Qty: 28 TABLET | Refills: 0 | Status: SHIPPED | OUTPATIENT
Start: 2017-12-21 | End: 2017-12-28

## 2017-12-21 NOTE — PROGRESS NOTES
Alcohol use 1.2 oz/week     2 Cans of beer per week      Comment: Occasional, \"2x last month\"    Drug use: No      Comment: pot one year ago    Sexual activity: Yes     Partners: Female     Other Topics Concern    Not on file     Social History Narrative    No narrative on file       Family HISTORY    Family History   Problem Relation Age of Onset    Depression Mother     Mental Illness Maternal Uncle     Arthritis Paternal Grandmother        PHYSICAL EXAM    Vital Signs:  Ht 5' 10\" (1.778 m)   Wt 268 lb 4.8 oz (121.7 kg)   BMI 38.50 kg/m²   General Appearance:  Obese body habitus. Alert and oriented to person, place, and time. Affect:  His affect is somewhat flat and may be associated with his autistic spectrum disorder or bipolar disorder. .   Gait:  Normal. Good balance and coordination. Reflexes:  Intact. Sensation:  Normal.   Pulses:  Normal.   Skin:  Normal with the exception of left long finger, which is swollen over the nail, but there is no evidence of some ungual hematoma. At this time. Left Hand Exam:    Hand dominance - right  Finger Exam - left long finger swollen and tender as noted above. Axial and rotational alignment is normal.  Sensory Exam - normal symmetric both upper and lower extremities. On exam to his right ankle, he has some moderate instability to anterior drawer and talar tilt. He has no crepitus or swelling and no discoloration. On x-ray to his low back. He has good lumbar range of motion with no palpable defects. He has localized tenderness to the left lumbosacral junction but no sciatic notch tenderness. He has normal and symmetric sensation motor function reflexes and negative straight leg raise and negative Lasegue's.        Finger   Range of Motion Right Left   DIP Extension     DIP Flexion     PIP Extension     CMC     PIP Flexion     MP Extension     MP Flexion       IMAGING STUDIES    X-rays were not done today     IMPRESSION    Left long finger tuft fracture, resolved  Chronic low back pain      PLAN    I would recommend he be referred to spine    I will give him some medication for inflammation and pain with acute pain in his left long finger    An OAARS report was run. The results were reviewed. The results of this report were used in formulating a treatment plan for this patient. Report was Kept confidential and disposed of appropriately.

## 2017-12-21 NOTE — FLOWSHEET NOTE
ROM for improvements in LE, proximal hip, and core control with self care, mobility, lifting, ambulation. [x] (45562) Provided verbal/tactile cueing for activities related to improving balance, coordination, kinesthetic sense, posture, motor skill, proprioception  to assist with LE, proximal hip, and core control in self care, mobility, lifting, ambulation and eccentric single leg control. Home Exercise Program:    [x] (86024) Reviewed/Progressed HEP activities related to strengthening, flexibility, endurance, ROM of core, proximal hip and LE for functional self-care, mobility, lifting and ambulation/stair navigation   [x] (30236)Reviewed/Progressed HEP activities related to improving balance, coordination, kinesthetic sense, posture, motor skill, proprioception of core, proximal hip and LE for self care, mobility, lifting, and ambulation/stair navigation      Manual Treatments:  PROM / STM / Oscillations-Mobs:  G-I, II, III, IV (PA's, Inf., Post.)  [x] (56608) Provided manual therapy to mobilize LE, proximal hip and/or LS spine soft tissue/joints for the purpose of modulating pain, promoting relaxation,  increasing ROM, reducing/eliminating soft tissue swelling/inflammation/restriction, improving soft tissue extensibility and allowing for proper ROM for normal function with self care, mobility, lifting and ambulation. Modalities: na      Charges:  Timed Code Treatment Minutes: 40   Total Treatment Minutes: 40     [] EVAL (LOW) 85254 (typically 20 minutes face-to-face)  [] EVAL (MOD) 31511 (typically 30 minutes face-to-face)  [] EVAL (HIGH) 45807 (typically 45 minutes face-to-face)  [] RE-EVAL     [x] RH(38955) x  2   [] Ionto  [x] NMR (81195) x  1   [] Vaso  [] Manual (76752) x       [] Mech Traction  [] ES (unattended):   [] Other:     GOALS:  Therapist goals for Patient:   Short Term Goals: To be achieved in: 2 weeks  1.  Independent in HEP and progression per patient tolerance, in order to prevent re-injury. 2. Patient will have a decrease in pain to facilitate improvement in movement, function, and ADLs as indicated by Functional Deficits.     Long Term Goals: To be achieved in: 12 weeks  1. Disability index score of 10% or less for the LEFS to assist with reaching prior level of function. 2. Patient will demonstrate increased AROM to equal the opposite side bilaterally to allow for proper joint functioning as indicated by patients Functional Deficits. 3. Patient will demonstrate an increase in strength to LE MMT scores to match bilaterally to allow for proper functional mobility as indicated by patients Functional Deficits. 4. Patient will return to all transfers, work activities, and functional activities without increased symptoms or restriction. 5. Patient will have 0/10 pain with ADL's.  6. Patient stated goal: Return to [facility] fighting       Goals that are underlined signify the goal has been accomplished. Progression Towards Functional goals:  [] Patient is progressing as expected towards functional goals listed. [] Progression is slowed due to complexities listed. [] Progression has been slowed due to co-morbidities.   [x] Plan just implemented, too soon to assess goals progression  [] Other:     ASSESSMENT:  See eval    Treatment/Activity Tolerance:   [x] Patient tolerated treatment well [] Patient limited by fatique  [] Patient limited by pain  [] Patient limited by other medical complications  [] Other:     Prognosis: [] Good [] Fair  [] Poor    Patient Requires Follow-up: [x] Yes  [] No    PLAN: See eval  [x] Continue per plan of care [] Alter current plan (see comments)  [] Plan of care initiated [] Hold pending MD visit [] Discharge    Electronically signed by: Anirudh Chan PT

## 2018-01-01 ENCOUNTER — HOSPITAL ENCOUNTER (OUTPATIENT)
Dept: PHYSICAL THERAPY | Age: 28
Discharge: HOME | End: 2018-01-31
Attending: ORTHOPAEDIC SURGERY | Admitting: ORTHOPAEDIC SURGERY

## 2018-01-02 ENCOUNTER — TELEPHONE (OUTPATIENT)
Dept: FAMILY MEDICINE CLINIC | Age: 28
End: 2018-01-02

## 2018-01-18 ENCOUNTER — TELEPHONE (OUTPATIENT)
Dept: FAMILY MEDICINE CLINIC | Age: 28
End: 2018-01-18

## 2018-01-18 NOTE — TELEPHONE ENCOUNTER
Small hiatal hernia seen on CT.  Please add this to the patient's medical history  As was done in the ER and patient should have already been informed

## 2018-01-19 PROBLEM — K44.9 HIATAL HERNIA: Status: ACTIVE | Noted: 2018-01-19

## 2018-01-24 ENCOUNTER — TELEPHONE (OUTPATIENT)
Dept: FAMILY MEDICINE CLINIC | Age: 28
End: 2018-01-24

## 2018-06-29 ENCOUNTER — TELEPHONE (OUTPATIENT)
Dept: FAMILY MEDICINE CLINIC | Age: 28
End: 2018-06-29

## 2018-06-29 NOTE — TELEPHONE ENCOUNTER
Radiology report from [address] [facility] for CT of the abd and pelvis see attached

## 2018-07-27 ENCOUNTER — TELEPHONE (OUTPATIENT)
Dept: FAMILY MEDICINE CLINIC | Age: 28
End: 2018-07-27

## 2018-07-28 ENCOUNTER — TELEPHONE (OUTPATIENT)
Dept: FAMILY MEDICINE CLINIC | Age: 28
End: 2018-07-28

## 2018-07-28 NOTE — TELEPHONE ENCOUNTER
Physician documentation and nurse's notes from 7/26/18 ER visit at [facility] see attached

## 2018-08-08 ENCOUNTER — TELEPHONE (OUTPATIENT)
Dept: FAMILY MEDICINE CLINIC | Age: 28
End: 2018-08-08

## 2018-08-17 ENCOUNTER — APPOINTMENT (OUTPATIENT)
Dept: GENERAL RADIOLOGY | Age: 28
End: 2018-08-17
Payer: MEDICARE

## 2018-08-17 ENCOUNTER — HOSPITAL ENCOUNTER (EMERGENCY)
Age: 28
Discharge: HOME | End: 2018-08-17
Payer: MEDICARE

## 2018-08-17 VITALS
OXYGEN SATURATION: 99 % | RESPIRATION RATE: 16 BRPM | SYSTOLIC BLOOD PRESSURE: 141 MMHG | DIASTOLIC BLOOD PRESSURE: 87 MMHG | HEART RATE: 86 BPM | HEIGHT: 72 IN | TEMPERATURE: 99.1 F | WEIGHT: 220 LBS | BODY MASS INDEX: 29.8 KG/M2

## 2018-08-17 DIAGNOSIS — S62.336A CLOSED DISPLACED FRACTURE OF NECK OF FIFTH METACARPAL BONE OF RIGHT HAND, INITIAL ENCOUNTER: Primary | ICD-10-CM

## 2018-08-17 PROCEDURE — 99283 EMERGENCY DEPT VISIT LOW MDM: CPT

## 2018-08-17 PROCEDURE — 73110 X-RAY EXAM OF WRIST: CPT

## 2018-08-17 PROCEDURE — 4500000023 HC ED LEVEL 3 PROCEDURE

## 2018-08-17 PROCEDURE — 73130 X-RAY EXAM OF HAND: CPT

## 2018-08-17 RX ORDER — HYDROCODONE BITARTRATE AND ACETAMINOPHEN 5; 325 MG/1; MG/1
1 TABLET ORAL EVERY 6 HOURS PRN
Qty: 10 TABLET | Refills: 0 | Status: SHIPPED | OUTPATIENT
Start: 2018-08-17 | End: 2018-08-22

## 2018-08-17 ASSESSMENT — PAIN DESCRIPTION - DESCRIPTORS
DESCRIPTORS: ACHING
DESCRIPTORS: THROBBING

## 2018-08-17 ASSESSMENT — ENCOUNTER SYMPTOMS
DIARRHEA: 0
BACK PAIN: 0
SORE THROAT: 0
COUGH: 0
CONSTIPATION: 0
SHORTNESS OF BREATH: 0
EYE PAIN: 0
RHINORRHEA: 0
NAUSEA: 0
CHEST TIGHTNESS: 0
ABDOMINAL PAIN: 0
EYE REDNESS: 0
FACIAL SWELLING: 0

## 2018-08-17 ASSESSMENT — PAIN DESCRIPTION - LOCATION
LOCATION: HAND
LOCATION: HAND

## 2018-08-17 ASSESSMENT — PAIN DESCRIPTION - ORIENTATION
ORIENTATION: RIGHT
ORIENTATION: RIGHT

## 2018-08-17 ASSESSMENT — PAIN DESCRIPTION - PAIN TYPE
TYPE: ACUTE PAIN
TYPE: ACUTE PAIN

## 2018-08-17 NOTE — ED PROVIDER NOTES
[facility]  e[facility]GENCY dEPARTMENT eNCOUnter      Pt Name: Beryle Gunner  MRN: [id number]  Codygfjesica 1990  Date of evaluation: 8/17/2018  Provider: Cally Leos PA-C      Chief Complaint:    Chief Complaint   Patient presents with    Hand Injury     right hand injury from punching a brick wall last night       Nursing Notes, Past Medical Hx, Past Surgical Hx, Social Hx, Allergies, and Family Hx were all reviewed and agreed with or any disagreements were addressed in the HPI.    HPI:  (Location, Duration, Timing, Severity, Quality, Assoc Sx, Context, Modifying factors)  This is a  33 y.o. male with history of autism and mental retardation presenting for evaluation of right hand and wrist pain. Reports that around 1:00 this morning the patient became angry at one of his friends and decided to punch a wall. He states that he suffered instant pain and deformity to his right hand. He reports he has some numbness and tingling and his pinky and ring finger on this hand. Reports that he has difficulty moving these fingers as well. Caregivers the pain as sharp and stabbing and a pins and needles sensation. He denies other injury sustained. Denies previous injury to this hand or wrist.  Denies using any medications for her symptoms prior to presenting today.     Past Medical/Surgical History:      Diagnosis Date    ADHD     Adult respiratory distress syndrome ([address]     ICU     Anxiety     Bipolar disorder     Cognitive disorder     Finger fracture, left 11/22/2017    GERD (gastroesophageal reflux disease)     Hiatal hernia 01/19/2018    Hypertension     Kidney stones     MR (mental retardation), moderate     Nausea and vomiting     Pneumococcal pneumonia ([address]     Seizures ([address]     pt denied any seizures ever         Procedure Laterality Date    MOUTH SURGERY      MOUTH SURGERY         Medications:  Previous Medications    BUSPIRONE (BUSPAR) 5 MG TABLET    Take 1 tablet by mouth daily    MELOXICAM (MOBIC) 15 MG TABLET    Take 1 tablet by mouth daily    SAPHRIS 5 MG SUBL SUBLINGUAL TABLET             Review of Systems:  Review of Systems   Constitutional: Negative for diaphoresis, fatigue and fever. HENT: Negative for ear pain, facial swelling, postnasal drip, rhinorrhea and sore throat. Eyes: Negative for pain, redness and visual disturbance. Respiratory: Negative for cough, chest tightness and shortness of breath. Cardiovascular: Negative for chest pain, palpitations and leg swelling. Gastrointestinal: Negative for abdominal pain, constipation, diarrhea and nausea. Genitourinary: Negative for difficulty urinating and enuresis. Musculoskeletal: Positive for arthralgias. Negative for back pain and myalgias. Skin: Negative for pallor and rash. Neurological: Positive for numbness. Negative for dizziness and headaches. Psychiatric/Behavioral: Negative for agitation, behavioral problems and confusion. Positives and Pertinent negatives as per HPI. Except as noted above in the ROS, problem specific ROS was completed and is negative. Physical Exam:  Physical Exam   Constitutional: He is oriented to person, place, and time. He appears well-developed and well-nourished. HENT:   Head: Normocephalic and atraumatic. Nose: Nose normal.   Eyes: Pupils are equal, round, and reactive to light. EOM are normal. Right eye exhibits no discharge. Left eye exhibits no discharge. Neck: Normal range of motion. Neck supple. Cardiovascular: Normal rate, regular rhythm and normal heart sounds. Exam reveals no gallop and no friction rub. No murmur heard. Pulmonary/Chest: Effort normal and breath sounds normal. No respiratory distress. He has no wheezes. He has no rales. Musculoskeletal:        Right hand: He exhibits decreased range of motion (inability to move fifth and fourth digit.   There is also initial encounter        DISPOSITION Decision To Discharge 08/17/2018 01:48:33 PM      PATIENT REFERRED TO:  Harry Rowley, APRN - CNP  [address] [facility]  [phone]    Schedule an appointment as soon as possible for a visit       [facility] [place] [facility]  [address] [facility] [address]  Go to   As needed, If symptoms worsen    Natty Greer MD  [place] [address] [facility] [address] [facility]  [phone]    Schedule an appointment as soon as possible for a visit in 2 days        DISCHARGE MEDICATIONS:  New Prescriptions    HYDROCODONE-ACETAMINOPHEN (NORCO) 5-325 MG PER TABLET    Take 1 tablet by mouth every 6 hours as needed for Pain for up to 7 days. Andres Sage        DISCONTINUED MEDICATIONS:  Discontinued Medications    No medications on file              (Please note the MDM and HPI sections of this note were completed with a voice recognition program.  Efforts were made to edit the dictations but occasionally words are mis-transcribed.)    Electronically signed, Jiles Leyden, PA-C,           Alverto Kirkland PA-C  08/17/18 0349

## 2018-08-20 ENCOUNTER — HOSPITAL ENCOUNTER (EMERGENCY)
Age: 28
Discharge: HOME | End: 2018-08-20
Attending: EMERGENCY MEDICINE
Payer: MEDICARE

## 2018-08-20 VITALS
BODY MASS INDEX: 29.8 KG/M2 | WEIGHT: 220 LBS | DIASTOLIC BLOOD PRESSURE: 98 MMHG | SYSTOLIC BLOOD PRESSURE: 142 MMHG | RESPIRATION RATE: 16 BRPM | HEIGHT: 72 IN | TEMPERATURE: 98.5 F | HEART RATE: 80 BPM | OXYGEN SATURATION: 96 %

## 2018-08-20 DIAGNOSIS — S62.326A CLOSED DISPLACED FRACTURE OF SHAFT OF FIFTH METACARPAL BONE OF RIGHT HAND, INITIAL ENCOUNTER: Primary | ICD-10-CM

## 2018-08-20 PROCEDURE — 6370000000 HC RX 637 (ALT 250 FOR IP)

## 2018-08-20 PROCEDURE — 99281 EMR DPT VST MAYX REQ PHY/QHP: CPT

## 2018-08-20 RX ORDER — OXYCODONE HYDROCHLORIDE AND ACETAMINOPHEN 5; 325 MG/1; MG/1
TABLET ORAL
Status: COMPLETED
Start: 2018-08-20 | End: 2018-08-20

## 2018-08-20 RX ORDER — OXYCODONE HYDROCHLORIDE AND ACETAMINOPHEN 5; 325 MG/1; MG/1
1 TABLET ORAL EVERY 6 HOURS PRN
Qty: 12 TABLET | Refills: 0 | Status: SHIPPED | OUTPATIENT
Start: 2018-08-20 | End: 2018-08-23

## 2018-08-20 RX ORDER — OXYCODONE HYDROCHLORIDE AND ACETAMINOPHEN 5; 325 MG/1; MG/1
1 TABLET ORAL ONCE
Status: COMPLETED | OUTPATIENT
Start: 2018-08-20 | End: 2018-08-20

## 2018-08-20 RX ADMIN — OXYCODONE HYDROCHLORIDE AND ACETAMINOPHEN 1 TABLET: 5; 325 TABLET ORAL at 22:30

## 2018-08-20 ASSESSMENT — PAIN SCALES - GENERAL
PAINLEVEL_OUTOF10: 10
PAINLEVEL_OUTOF10: 10

## 2018-08-20 ASSESSMENT — PAIN DESCRIPTION - LOCATION: LOCATION: ARM

## 2018-08-20 ASSESSMENT — PAIN DESCRIPTION - PAIN TYPE: TYPE: ACUTE PAIN

## 2018-08-20 ASSESSMENT — PAIN DESCRIPTION - ORIENTATION: ORIENTATION: RIGHT

## 2018-08-20 NOTE — Clinical Note
ROVERTO Perez on the area for additional pain relief.   He will be provided additional pain medication until he is seen by orthopedic surgeon

## 2018-08-21 NOTE — ED PROVIDER NOTES
Tobacco Smoker     Packs/day: 0.50     Years: 10.00     Types: Cigarettes    Smokeless tobacco: Current User     Types: Chew    Alcohol use 1.2 oz/week     2 Cans of beer per week      Comment: Occasional, \"2x last month\"    Drug use: No      Comment: pot one year ago    Sexual activity: Yes     Partners: Female     Other Topics Concern    Not on file     Social History Narrative    No narrative on file       No current facility-administered medications for this encounter. Current Outpatient Prescriptions   Medication Sig Dispense Refill    HYDROcodone-acetaminophen (NORCO) 5-325 MG per tablet Take 1 tablet by mouth every 6 hours as needed for Pain for up to 7 days. . 10 tablet 0    SAPHRIS 5 MG SUBL sublingual tablet       busPIRone (BUSPAR) 5 MG tablet Take 1 tablet by mouth daily         No Known Allergies    REVIEW OF SYSTEMS  10 systems reviewed, pertinent positives per HPI otherwise noted to be negative. PHYSICAL EXAM  BP (!) 142/98   Pulse 80   Temp 98.5 °F (36.9 °C) (Oral)   Resp 16   Ht 6' (1.829 m)   Wt 220 lb (99.8 kg)   SpO2 96%   BMI 29.84 kg/m²    GENERAL APPEARANCE: Awake and alert. Cooperative and in no obvious distress. HENT: Normocephalic. Atraumatic. Mucous membranes are moist  NECK: Supple. EYES: PERRL. EOM's grossly intact. HEART/CHEST: RRR. No murmurs or gallups. LUNGS:.Normal respiratory effort is noted. Lungs CTA. Speaking comfortably in full sentences. ABDOMEN: No tenderness. Soft. Non-distended. No masses. No organomegaly. No guarding or rebound. MUSCULOSKELETAL: Right wrist and hand edema from injury. Compartments soft. mild deformity. No tenderness in the extremities. All extremities neurovascularly intact. SKIN: Warm and dry. No acute rashes. NEUROLOGICAL: Alert and oriented. sensation and motor intact. Gait is normal.  PSYCHIATRIC: Normal mood and affect.     RADIOLOGY  No need to repeat radiographs is no new trauma    ED COURSE/MDM  Patient seen and evaluated. Old records reviewed. The ED course was reviewed and results discussed with patient     Patient will be provided with Percocet for the next 2 days until he can be seen by the orthopedic surgeon. He is advised not to drive while on this medication. He'll be provided one here in the emergency department. Procedures:    During the patient's ED course, the patient was given: Percocet      CLINICAL IMPRESSION  Right boxer's fracture occurred on Friday seen at [facility] now waiting for medication refill        [address] was discharged to home in good condition. Patient was given scripts for the following medications. I counseled patient how to take these medications. Percocet 12 tablets of 5 mg    Follow-up with: The orthopedic surgeon as scheduled on the 22nd    DISCLAIMER: This chart was created using Dragon dictation software. Efforts were made by me to ensure accuracy, however some errors may be present due to limitations of this technology and occasionally words are not transcribed correctly.         Geovany Giraldo MD  08/20/18 [id number]

## 2018-08-22 ENCOUNTER — OFFICE VISIT (OUTPATIENT)
Dept: FAMILY MEDICINE CLINIC | Age: 28
End: 2018-08-22

## 2018-08-22 ENCOUNTER — HOSPITAL ENCOUNTER (OUTPATIENT)
Dept: OCCUPATIONAL THERAPY | Age: 28
Setting detail: THERAPIES SERIES
Discharge: HOME | End: 2018-08-22
Payer: MEDICARE

## 2018-08-22 ENCOUNTER — OFFICE VISIT (OUTPATIENT)
Dept: ORTHOPEDIC SURGERY | Age: 28
End: 2018-08-22

## 2018-08-22 VITALS — HEIGHT: 72 IN | BODY MASS INDEX: 29.8 KG/M2 | WEIGHT: 220 LBS

## 2018-08-22 VITALS
SYSTOLIC BLOOD PRESSURE: 132 MMHG | DIASTOLIC BLOOD PRESSURE: 90 MMHG | BODY MASS INDEX: 29.8 KG/M2 | HEIGHT: 72 IN | WEIGHT: 220 LBS

## 2018-08-22 DIAGNOSIS — M79.641 RIGHT HAND PAIN: Primary | ICD-10-CM

## 2018-08-22 DIAGNOSIS — S62.336A CLOSED DISPLACED FRACTURE OF NECK OF FIFTH METACARPAL BONE OF RIGHT HAND, INITIAL ENCOUNTER: ICD-10-CM

## 2018-08-22 DIAGNOSIS — F60.3 BORDERLINE PERSONALITY DISORDER (HCC): Primary | Chronic | ICD-10-CM

## 2018-08-22 PROCEDURE — L3808 WHFO, RIGID W/O JOINTS: HCPCS | Performed by: OCCUPATIONAL THERAPIST

## 2018-08-22 PROCEDURE — G8427 DOCREV CUR MEDS BY ELIG CLIN: HCPCS | Performed by: NURSE PRACTITIONER

## 2018-08-22 PROCEDURE — 99213 OFFICE O/P EST LOW 20 MIN: CPT | Performed by: ORTHOPAEDIC SURGERY

## 2018-08-22 PROCEDURE — 99213 OFFICE O/P EST LOW 20 MIN: CPT | Performed by: NURSE PRACTITIONER

## 2018-08-22 PROCEDURE — 4004F PT TOBACCO SCREEN RCVD TLK: CPT | Performed by: ORTHOPAEDIC SURGERY

## 2018-08-22 PROCEDURE — G8427 DOCREV CUR MEDS BY ELIG CLIN: HCPCS | Performed by: ORTHOPAEDIC SURGERY

## 2018-08-22 PROCEDURE — G8417 CALC BMI ABV UP PARAM F/U: HCPCS | Performed by: ORTHOPAEDIC SURGERY

## 2018-08-22 PROCEDURE — G8417 CALC BMI ABV UP PARAM F/U: HCPCS | Performed by: NURSE PRACTITIONER

## 2018-08-22 PROCEDURE — 4004F PT TOBACCO SCREEN RCVD TLK: CPT | Performed by: NURSE PRACTITIONER

## 2018-08-22 RX ORDER — ASENAPINE MALEATE 5 MG/1
5 TABLET SUBLINGUAL 2 TIMES DAILY
Qty: 60 TABLET | Refills: 0 | Status: SHIPPED | OUTPATIENT
Start: 2018-08-22 | End: 2020-09-15 | Stop reason: HOSPADM

## 2018-08-22 RX ORDER — BUSPIRONE HYDROCHLORIDE 5 MG/1
5 TABLET ORAL DAILY
Qty: 30 TABLET | Refills: 0 | Status: SHIPPED | OUTPATIENT
Start: 2018-08-22 | End: 2020-09-15 | Stop reason: HOSPADM
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
## 2018-08-22 NOTE — PROGRESS NOTES
middle finger slight discomfort on axial loading of the ring finger and severe discomfort on axial loading of the small finger. He really can't move middle ring and small fingers without significant pain. Vascular exam shows normal capillary refill in all 3 digits. Neurologic exam no associated numbness or tingling    Additional Comments:     Additional Examinations:  X-Ray Findings:  PA lateral and oblique x-rays of the right hand were repeated today. There is a transverse 5th metacarpal neck fracture with less than 5° volar angulatory deformity and no significant shortening. No evidence of fracture in the ring finger metacarpal or proximal phalanx.   Middle ring and small carpometacarpal joints appear normal  Additional Diagnostic Test Findings:    Office Procedures:    Orders Placed This Encounter   Procedures    XR HAND RIGHT (MIN 3 VIEWS)   [facility] PT [facility] Physical Therapy     Referral Priority:   Routine     Referral Type:   Eval and Treat     Referral Reason:   Specialty Services Required     Requested Specialty:   Physical Therapy     Number of Visits Requested:   1
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
## 2018-08-23 ASSESSMENT — ENCOUNTER SYMPTOMS
RESPIRATORY NEGATIVE: 1
GASTROINTESTINAL NEGATIVE: 1
EYES NEGATIVE: 1

## 2018-08-27 ENCOUNTER — TELEPHONE (OUTPATIENT)
Dept: PRIMARY CARE CLINIC | Age: 28
End: 2018-08-27

## 2018-08-29 NOTE — TELEPHONE ENCOUNTER
Received DENIAL for Saphris 5MG SL SUBL. Will scan denial letter once received. Please advise patient.

## 2018-09-19 NOTE — TELEPHONE ENCOUNTER
Patient called in, stated that the ankle pain is much worse, doesn't want to keep taking Ibuprofen and ASA for fear of damaging his liver - informed pt you do not Rx narcotics - he states he isn't looking for narcotic. Offered to see if we could get his appt bumped up at Dr. Estuardo Del Real which is not until 11/30 or offer to get him a different ortho - pt doesn't want us to do to either. But wanted to know what else he could get for pain.     [facility]

## 2018-10-04 ENCOUNTER — APPOINTMENT (OUTPATIENT)
Dept: GENERAL RADIOLOGY | Age: 28
End: 2018-10-04
Payer: MEDICARE

## 2018-10-04 ENCOUNTER — HOSPITAL ENCOUNTER (EMERGENCY)
Age: 28
Discharge: HOME | End: 2018-10-04
Payer: MEDICARE

## 2018-10-04 VITALS
DIASTOLIC BLOOD PRESSURE: 88 MMHG | TEMPERATURE: 98.4 F | HEIGHT: 71 IN | HEART RATE: 83 BPM | SYSTOLIC BLOOD PRESSURE: 145 MMHG | RESPIRATION RATE: 18 BRPM | BODY MASS INDEX: 30.24 KG/M2 | OXYGEN SATURATION: 99 % | WEIGHT: 216 LBS

## 2018-10-04 DIAGNOSIS — S62.91XG: ICD-10-CM

## 2018-10-04 DIAGNOSIS — J40 BRONCHITIS: Primary | ICD-10-CM

## 2018-10-04 LAB
A/G RATIO: 1.3 (ref 1.1–2.2)
ALBUMIN SERPL-MCNC: 4.5 G/DL (ref 3.4–5)
ALP BLD-CCNC: 54 U/L (ref 40–129)
ALT SERPL-CCNC: 23 U/L (ref 10–40)
ANION GAP SERPL CALCULATED.3IONS-SCNC: 13 MMOL/L (ref 3–16)
AST SERPL-CCNC: 34 U/L (ref 15–37)
BASOPHILS ABSOLUTE: 0.1 K/UL (ref 0–0.2)
BASOPHILS RELATIVE PERCENT: 0.7 %
BILIRUB SERPL-MCNC: 1 MG/DL (ref 0–1)
BUN BLDV-MCNC: 12 MG/DL (ref 7–20)
CALCIUM SERPL-MCNC: 9.8 MG/DL (ref 8.3–10.6)
CHLORIDE BLD-SCNC: 101 MMOL/L (ref 99–110)
CO2: 27 MMOL/L (ref 21–32)
CREAT SERPL-MCNC: 0.8 MG/DL (ref 0.9–1.3)
EOSINOPHILS ABSOLUTE: 0.3 K/UL (ref 0–0.6)
EOSINOPHILS RELATIVE PERCENT: 4 %
GFR AFRICAN AMERICAN: >60
GFR NON-AFRICAN AMERICAN: >60
GLOBULIN: 3.4 G/DL
GLUCOSE BLD-MCNC: 92 MG/DL (ref 70–99)
HCT VFR BLD CALC: 44.3 % (ref 40.5–52.5)
HEMOGLOBIN: 15 G/DL (ref 13.5–17.5)
LACTIC ACID: 0.7 MMOL/L (ref 0.4–2)
LYMPHOCYTES ABSOLUTE: 2.3 K/UL (ref 1–5.1)
LYMPHOCYTES RELATIVE PERCENT: 27.6 %
MCH RBC QN AUTO: 31 PG (ref 26–34)
MCHC RBC AUTO-ENTMCNC: 33.7 G/DL (ref 31–36)
MCV RBC AUTO: 91.8 FL (ref 80–100)
MONOCYTES ABSOLUTE: 0.8 K/UL (ref 0–1.3)
MONOCYTES RELATIVE PERCENT: 9.8 %
NEUTROPHILS ABSOLUTE: 4.8 K/UL (ref 1.7–7.7)
NEUTROPHILS RELATIVE PERCENT: 57.9 %
PDW BLD-RTO: 12.2 % (ref 12.4–15.4)
PLATELET # BLD: 280 K/UL (ref 135–450)
PMV BLD AUTO: 8.1 FL (ref 5–10.5)
POTASSIUM REFLEX MAGNESIUM: 4.1 MMOL/L (ref 3.5–5.1)
RBC # BLD: 4.83 M/UL (ref 4.2–5.9)
SODIUM BLD-SCNC: 141 MMOL/L (ref 136–145)
TOTAL PROTEIN: 7.9 G/DL (ref 6.4–8.2)
TROPONIN: <0.01 NG/ML
WBC # BLD: 8.3 K/UL (ref 4–11)

## 2018-10-04 PROCEDURE — 73120 X-RAY EXAM OF HAND: CPT

## 2018-10-04 PROCEDURE — 85025 COMPLETE CBC W/AUTO DIFF WBC: CPT

## 2018-10-04 PROCEDURE — 6360000002 HC RX W HCPCS: Performed by: PHYSICIAN ASSISTANT

## 2018-10-04 PROCEDURE — 73130 X-RAY EXAM OF HAND: CPT

## 2018-10-04 PROCEDURE — 93005 ELECTROCARDIOGRAM TRACING: CPT | Performed by: PHYSICIAN ASSISTANT

## 2018-10-04 PROCEDURE — 36415 COLL VENOUS BLD VENIPUNCTURE: CPT

## 2018-10-04 PROCEDURE — 83605 ASSAY OF LACTIC ACID: CPT

## 2018-10-04 PROCEDURE — 87040 BLOOD CULTURE FOR BACTERIA: CPT

## 2018-10-04 PROCEDURE — 80053 COMPREHEN METABOLIC PANEL: CPT

## 2018-10-04 PROCEDURE — 84484 ASSAY OF TROPONIN QUANT: CPT

## 2018-10-04 PROCEDURE — 71046 X-RAY EXAM CHEST 2 VIEWS: CPT

## 2018-10-04 PROCEDURE — 6370000000 HC RX 637 (ALT 250 FOR IP): Performed by: PHYSICIAN ASSISTANT

## 2018-10-04 PROCEDURE — 99284 EMERGENCY DEPT VISIT MOD MDM: CPT

## 2018-10-04 PROCEDURE — 4500000024 HC ED LEVEL 4 PROCEDURE

## 2018-10-04 PROCEDURE — 96374 THER/PROPH/DIAG INJ IV PUSH: CPT

## 2018-10-04 RX ORDER — NAPROXEN 500 MG/1
500 TABLET ORAL 2 TIMES DAILY PRN
Qty: 20 TABLET | Refills: 0 | Status: SHIPPED | OUTPATIENT
Start: 2018-10-04 | End: 2020-09-15 | Stop reason: HOSPADM

## 2018-10-04 RX ORDER — AZITHROMYCIN 250 MG/1
500 TABLET, FILM COATED ORAL ONCE
Status: COMPLETED | OUTPATIENT
Start: 2018-10-04 | End: 2018-10-04

## 2018-10-04 RX ORDER — IPRATROPIUM BROMIDE AND ALBUTEROL SULFATE 2.5; .5 MG/3ML; MG/3ML
1 SOLUTION RESPIRATORY (INHALATION) ONCE
Status: COMPLETED | OUTPATIENT
Start: 2018-10-04 | End: 2018-10-04

## 2018-10-04 RX ORDER — KETOROLAC TROMETHAMINE 30 MG/ML
15 INJECTION, SOLUTION INTRAMUSCULAR; INTRAVENOUS ONCE
Status: COMPLETED | OUTPATIENT
Start: 2018-10-04 | End: 2018-10-04

## 2018-10-04 RX ORDER — AZITHROMYCIN 250 MG/1
250 TABLET, FILM COATED ORAL DAILY
Qty: 4 TABLET | Refills: 0 | Status: SHIPPED | OUTPATIENT
Start: 2018-10-04 | End: 2018-10-08

## 2018-10-04 RX ADMIN — AZITHROMYCIN 500 MG: 250 TABLET, FILM COATED ORAL at 19:46

## 2018-10-04 RX ADMIN — KETOROLAC TROMETHAMINE 15 MG: 30 INJECTION, SOLUTION INTRAMUSCULAR at 19:46

## 2018-10-04 RX ADMIN — IPRATROPIUM BROMIDE AND ALBUTEROL SULFATE 1 AMPULE: .5; 3 SOLUTION RESPIRATORY (INHALATION) at 19:46

## 2018-10-04 ASSESSMENT — ENCOUNTER SYMPTOMS
VOMITING: 0
CHEST TIGHTNESS: 1
SHORTNESS OF BREATH: 1
COUGH: 1
RHINORRHEA: 1

## 2018-10-04 ASSESSMENT — PAIN DESCRIPTION - ORIENTATION: ORIENTATION: RIGHT

## 2018-10-04 ASSESSMENT — PAIN SCALES - GENERAL: PAINLEVEL_OUTOF10: 8

## 2018-10-04 ASSESSMENT — PAIN DESCRIPTION - LOCATION: LOCATION: HAND
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
## 2018-10-05 PROCEDURE — 93010 ELECTROCARDIOGRAM REPORT: CPT | Performed by: INTERNAL MEDICINE

## 2018-10-09 LAB
EKG ATRIAL RATE: 68 BPM
EKG DIAGNOSIS: NORMAL
EKG P AXIS: 47 DEGREES
EKG P-R INTERVAL: 130 MS
EKG Q-T INTERVAL: 416 MS
EKG QRS DURATION: 84 MS
EKG QTC CALCULATION (BAZETT): 442 MS
EKG R AXIS: 74 DEGREES
EKG T AXIS: 22 DEGREES
EKG VENTRICULAR RATE: 68 BPM

## 2018-10-10 LAB
BLOOD CULTURE, ROUTINE: NORMAL
CULTURE, BLOOD 2: NORMAL

## 2018-11-30 ENCOUNTER — OFFICE VISIT (OUTPATIENT)
Dept: ORTHOPEDIC SURGERY | Age: 28
End: 2018-11-30
Payer: MEDICARE

## 2018-11-30 VITALS
DIASTOLIC BLOOD PRESSURE: 96 MMHG | BODY MASS INDEX: 23.7 KG/M2 | HEART RATE: 66 BPM | WEIGHT: 175 LBS | SYSTOLIC BLOOD PRESSURE: 144 MMHG | HEIGHT: 72 IN

## 2018-11-30 DIAGNOSIS — S62.366D CLOSED NONDISPLACED FRACTURE OF NECK OF FIFTH METACARPAL BONE OF RIGHT HAND WITH ROUTINE HEALING: Primary | ICD-10-CM

## 2018-11-30 PROCEDURE — 4004F PT TOBACCO SCREEN RCVD TLK: CPT | Performed by: ORTHOPAEDIC SURGERY

## 2018-11-30 PROCEDURE — G8420 CALC BMI NORM PARAMETERS: HCPCS | Performed by: ORTHOPAEDIC SURGERY

## 2018-11-30 PROCEDURE — G8427 DOCREV CUR MEDS BY ELIG CLIN: HCPCS | Performed by: ORTHOPAEDIC SURGERY

## 2018-11-30 PROCEDURE — 99214 OFFICE O/P EST MOD 30 MIN: CPT | Performed by: ORTHOPAEDIC SURGERY

## 2018-11-30 PROCEDURE — G8484 FLU IMMUNIZE NO ADMIN: HCPCS | Performed by: ORTHOPAEDIC SURGERY

## 2018-11-30 RX ORDER — HYDROCODONE BITARTRATE AND ACETAMINOPHEN 5; 325 MG/1; MG/1
1 TABLET ORAL EVERY 6 HOURS PRN
Qty: 28 TABLET | Refills: 0 | Status: SHIPPED | OUTPATIENT
Start: 2018-11-30 | End: 2018-12-07

## 2018-11-30 NOTE — PROGRESS NOTES
Chief Complaint    Hand Pain (right hand pain)      History of Present Illness:  Valarie Gramajo is a 33 y.o. male injured his hand boxing. He does mixed martial arts type sport and broke as right 5th metacarpal neck. In August, over 3 months ago. He was treated with a TKO brace for about 4 weeks and then took it off and resumed activity. He's had a reinjury here recently and is concerned because he has increased swelling bruising. Pain Assessment  Location of Pain: Hand  Location Modifiers: Right  Severity of Pain: 8  Quality of Pain: Other (Comment)  Duration of Pain: Persistent  Frequency of Pain: Constant  Aggravating Factors: Bending, Straightening  Limiting Behavior: No  Result of Injury: Yes  Work-Related Injury: No  Are there other pain locations you wish to document?: No    Medical History:  Patient's medications, allergies, past medical, surgical, social and family histories were reviewed and updated as appropriate. Review of Systems:  Pertinent items are noted in HPI  Review of systems reviewed from Patient History Form dated on 11/30/18 and available in the patient's chart under the Media tab. Vital Signs:  BP (!) 144/96   Pulse 66   Ht 6' (1.829 m)   Wt 175 lb (79.4 kg)   BMI 23.73 kg/m²     General Exam:   Constitutional: Patient is adequately groomed with no evidence of malnutrition  DTRs: Deep tendon reflexes are intact  Mental Status: The patient is oriented to time, place and person. The patient's mood and affect are appropriate. Lymphatic: The lymphatic examination bilaterally reveals all areas to be without enlargement or induration. Vascular: Examination reveals no swelling or calf tenderness. Peripheral pulses are palpable and 2+. Neurological: The patient has good coordination. There is no weakness or sensory deficit. Right Hand Examination:    Inspection:  He does have swelling over the 5th metacarpal neck region with ecchymoses.   He has good axial and rotational

## 2020-08-25 ENCOUNTER — HOSPITAL ENCOUNTER (OUTPATIENT)
Dept: PSYCHIATRY | Age: 30
Setting detail: THERAPIES SERIES
Discharge: HOME | End: 2020-08-25
Payer: MEDICARE

## 2020-08-25 PROCEDURE — 90791 PSYCH DIAGNOSTIC EVALUATION: CPT | Performed by: COUNSELOR

## 2020-08-25 ASSESSMENT — SLEEP AND FATIGUE QUESTIONNAIRES
DO YOU USE A SLEEP AID: YES
SLEEP PATTERN: NIGHTMARES/TERRORS;DISTURBED/INTERRUPTED SLEEP
AVERAGE NUMBER OF SLEEP HOURS: 6
DIFFICULTY ARISING: NO
RESTFUL SLEEP: YES
DIFFICULTY STAYING ASLEEP: YES
DO YOU HAVE DIFFICULTY SLEEPING: YES
DIFFICULTY FALLING ASLEEP: NO

## 2020-08-25 ASSESSMENT — ANXIETY QUESTIONNAIRES
3. WORRYING TOO MUCH ABOUT DIFFERENT THINGS: 1-SEVERAL DAYS
4. TROUBLE RELAXING: 1-SEVERAL DAYS
1. FEELING NERVOUS, ANXIOUS, OR ON EDGE: 1-SEVERAL DAYS
5. BEING SO RESTLESS THAT IT IS HARD TO SIT STILL: 3-NEARLY EVERY DAY
3. WORRYING TOO MUCH ABOUT DIFFERENT THINGS: 1-SEVERAL DAYS
7. FEELING AFRAID AS IF SOMETHING AWFUL MIGHT HAPPEN: 0-NOT AT ALL
1. FEELING NERVOUS, ANXIOUS, OR ON EDGE: 1-SEVERAL DAYS
2. NOT BEING ABLE TO STOP OR CONTROL WORRYING: 1-SEVERAL DAYS
GAD7 TOTAL SCORE: 7
6. BECOMING EASILY ANNOYED OR IRRITABLE: 0-NOT AT ALL
7. FEELING AFRAID AS IF SOMETHING AWFUL MIGHT HAPPEN: 0-NOT AT ALL
GAD7 TOTAL SCORE: 8
2. NOT BEING ABLE TO STOP OR CONTROL WORRYING: 1-SEVERAL DAYS
4. TROUBLE RELAXING: 2-OVER HALF THE DAYS
6. BECOMING EASILY ANNOYED OR IRRITABLE: 0-NOT AT ALL
5. BEING SO RESTLESS THAT IT IS HARD TO SIT STILL: 3-NEARLY EVERY DAY

## 2020-08-25 ASSESSMENT — PATIENT HEALTH QUESTIONNAIRE - PHQ9: SUM OF ALL RESPONSES TO PHQ QUESTIONS 1-9: 5

## 2020-08-25 ASSESSMENT — LIFESTYLE VARIABLES: HISTORY_ALCOHOL_USE: NO
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
## 2020-08-31 ENCOUNTER — HOSPITAL ENCOUNTER (OUTPATIENT)
Dept: PSYCHIATRY | Age: 30
Setting detail: THERAPIES SERIES
Discharge: HOME | End: 2020-08-31
Payer: MEDICARE

## 2020-08-31 VITALS — TEMPERATURE: 98.4 F

## 2020-08-31 PROCEDURE — H2020 THER BEHAV SVC, PER DIEM: HCPCS | Performed by: COUNSELOR

## 2020-08-31 PROCEDURE — 90792 PSYCH DIAG EVAL W/MED SRVCS: CPT | Performed by: PSYCHIATRY & NEUROLOGY

## 2020-08-31 PROCEDURE — H2020 THER BEHAV SVC, PER DIEM: HCPCS

## 2020-08-31 NOTE — GROUP NOTE
Group Therapy Note    Date: 8/31/2020    Group Start Time:  3:15 PM  Group End Time:  4:30 PM  Group Topic: Music Therapy    [facility]    David Gregorio        Group Therapy Note    3:15 group focused on addressing barriers to recovery. Therapist facilitated guided imagery with music, visualizing a goal for treatment and barriers to achieving goal. Patients processed obstacles, self-sabotaging mechanisms, defenses and blocks, as well as first steps to overcome barriers for treatment. Patients then used an illustrated \"wall\" to identify personal barriers and create action steps to address them. Attendees: 2         Patient's Goal:  Patient will engage in structured processing, exhibit proper impulse control in processing AEB allowing proper time for response in group members. Patient will identify at least 3 barriers to recovery and steps to address these in treatment. Notes: Pt presented in group with hyperverbal engagement, frequently interrupting the therapist and additional group members. As observed in groups earlier in day, pt frequently made statements \"I've got chills\", \"You're a good friend to me. \" \"God brought us together here. \" Pt required repeated staff redirection to keep wearing his cloth face mask throughout group as he reported he has PTSD and doesn't feel comfortable wearing the mask; pt agreeable and did comply with redirection. During guided imagery intervention pt relocated to lay on the floor, perseverating on restless movements and hyperverbalization, unable to engage properly in intervention. During visual art portion of group, pt oppositional to directions for reflection in group process, instead used block colors to illustrate \"darkness in life. \" Pt frequently addressing other group member's gender identity and sexual orientation, making statements like \"Feliciano and Kymberly, not Feliciano and Dajuan\", which prolonged processing of acceptance in recovery. Pt with constant hyperverbalization, interrupting all speakers with elevated mood and exaggerated affect. Perseverating on [place] accused pt of using cocaine, being bulimic, and having a \"fake girlfriend on the internet\". Required frequent redirection to allow additional group member to speak, refusing to aknowledge this group member by their preferred name and pronouns. Demonstrated lack of insight across session.     Status After Intervention:  Improved    Participation Level: Interactive and Monopolizing    Participation Quality: Inappropriate, Intrusive and Resistant      Speech:  pressured and loud      Thought Process/Content: Flight of ideas  Perseverating      Affective Functioning: Exaggerated      Mood: euphoric      Level of consciousness:  Preoccupied      Response to Learning: Progressing to goal      Endings: None Reported    Modes of Intervention: Support, Socialization, Exploration, Clarifying and Problem-solving      Discipline Responsible: Psychoeducational Specialist      Signature:  Rudolph Bridges MM, MT-BC

## 2020-08-31 NOTE — GROUP NOTE
Group Therapy Note    Date: 8/31/2020    Group Start Time: 12:30 PM  Group End Time:  1:45 PM  Group Topic: Psychotherapy    [facility]    Dorie Fajardo, [facility]        Group Therapy Note    Attendees: 2         Patient's Goal:  Pt's goal was to learn more and to acknowledge problems. Notes:  Pt was engaged in group. Pt had to be redirected throughout the group to wear his mask. Pt struggles with social cue and was calling the other group member by the wrong name and pronoun. Pt wants to be respectful but does speak without thinking. Pt left group early to go the restroom.       Status After Intervention:  Unchanged    Participation Level: Interactive and Monopolizing    Participation Quality: Sharing      Speech:  pressured      Thought Process/Content: Perseverating      Affective Functioning: Congruent      Mood: anxious      Level of consciousness:  Inattentive      Response to Learning: Able to verbalize current knowledge/experience      Endings: None Reported    Modes of Intervention: Education, Support, Socialization, Exploration, Clarifying, Problem-solving, Activity, Movement, Confrontation, Limit-setting and Reality-testing      Discipline Responsible: /Counselor      Signature:  Dorie Fajardo, [facility]

## 2020-09-01 NOTE — H&P
[address]                 [address] [address]                              HISTORY AND PHYSICAL    PATIENT NAME: Norma Owusu                    :        1990  MED REC NO:   [id number]                          ROOM:  ACCOUNT NO:   [de-identified]                           ADMIT DATE: 2020  PROVIDER:     Diego Chinchilla. Karen Taveras MD    CHIEF COMPLAINT:  \"I need to find peace and I need to have help with my  medicine change. \"    HISTORY OF PRESENT ILLNESS:  He has been having symptoms since age 9  and the patient was seen in our facility in 2017. At that time, he was  diagnosed with bipolar disorder and autistic spectrum disorder. He has  been out of the area for a number of years. He stated that he would  like help with general functioning. He wants to learn ways to help cope  with stress and wants to learn coping skills. Apparently, he is close  to his mom and called her during the intake as well as when I spoke with  him today. He is given paperwork to complete. He states I \"don't appreciate the bipolar disorder diagnosis. \"  He  states he has not been on medications for three years until recently. He was placed on Vraylar as well as Xanax p.r.n. He spoke in a very  pressured and sometimes rapid pace. He had difficulty awaiting his turn  to speak and was very focused on his own agenda. At one point, he  impulsively stood up, pulled up his shirt to show me the skin fold that  he had from his weight loss. He states he went from 346 pounds to 212  pounds currently. He is not having any threats of self-harm or harm  toward others. He denied auditory or visual hallucinations. Insight  and judgment impaired. Denied any self-harm threats or harm toward  others. PRIOR PSYCHIATRIC HISTORY:  He was hospitalized in [facility] for 10  days. He was also hospitalized in [facility], 2017 to  2017.   At that time, he was discharged on [place]  injection, BuSpar, Topamax, Trazodone, and gabapentin. He was admitted  at that time for suicidal ideation, hearing voices. He had snorted  Percocet, Neurontin, and other pain medications overdose. He was  started on [place] at that time, but we lost track of him after  that and he at one point discontinued that medication. He has also been  an outpatient _____ who prescribes his Vraylar. PAST SUICIDE ATTEMPTS:  He denied, although there was an attempt in 2017  that was reported. CURRENT MEDICATION TRIALS:  Include Abilify, BuSpar, Thorazine,  Neurontin, Topamax. SUBSTANCE USE:  Denies alcohol, has cannabis use. LEGAL ISSUES:  He was on probation _____. TRAUMA HISTORY:  He denied. SOCIAL HISTORY:  He is currently single. He lives with his mother. He  has no children. Currently, he is working at [employer]. Taking  some classes. He does have a high school diploma. He smokes  cigarettes, half pack a day for 10 years. Alcohol, drinks a couple of  cans of beer a week at times. MEDICAL HISTORY:  Significant for seizures, GERD, hypertension,    CURRENT MEDICATIONS:  Vraylar 1 mg daily, Xanax 0.5 mg p.r.n. anxiety. REVIEW OF SYSTEMS:  Pertinent positives on the HPI, otherwise negative. PHYSICAL EXAMINATION:  VITAL SIGNS:  Temperature 98.4. MENTAL STATUS EXAMINATION:  The patient is a well-groomed 36-year-old  male. He is very engaged. He did have some level of intensity at  times. His speech was pressured, rapid, and he had difficulty awaiting  his turn to speak. He is very focused on presenting his agenda. He  denied any threats to harm self or others. Denied psychotic symptoms. Insight and judgment is impaired. He is oriented to person, place, and  time. Fund of knowledge and language intact. Attention, concentration  was fair. He is able to recall three objects immediately. He said his  mood was good.   Affect was elevated. He did not show any abnormalities  of movement. LABORATORY DATA:  No recent laboratories. DIAGNOSES:  AXIS I:  1. Bipolar affective disorder, hypomanic. 2.  Autistic spectrum disorder. AXIS II:  Deferred. AXIS III:  Unremarkable. AXIS IV:  Moderate. AXIS V:  55. PLAN:  1. We will continue with Vraylar 1 mg daily. 2.  The patient is rarely using Xanax at this time. 3. In IOP for six to eight weeks. 4.  Followup in outpatient to be arranged. 5.  Goal for discharge will be for the patient to show appropriate mood  stabilization and appropriate impulse control. We will meet monthly. Spent approximately 70 minutes on this evaluation with more than 50% of  the time discussing patient care and treatment options.         Eri Martinez MD    D: 08/31/2020 23:23:51       T: 09/01/2020 2:06:09     [email or web address]  Job#: [id number]     Doc#: [id number]    CC:

## 2020-09-02 NOTE — BH NOTE
9-1-20  Treatment team met to discuss Pt. Pt started program on 8-31-20 and was inappropriate in groups AEB not calling a group member by preferred name and pronouns, sharing his views and ostracizing the other group member. Pt kept taking his mask off and said he did not have to wear it. Pt was told that wearing a mask was part of the group expectations and if he was not able to then this is not the appropriate program for him. It was decided that the nurse and a therapist would meet with Pt and go over group expectations.

## 2020-09-04 ENCOUNTER — HOSPITAL ENCOUNTER (OUTPATIENT)
Dept: PSYCHIATRY | Age: 30
Setting detail: THERAPIES SERIES
Discharge: HOME | End: 2020-09-04
Payer: MEDICARE

## 2020-09-04 VITALS — TEMPERATURE: 98.2 F

## 2020-09-04 PROCEDURE — H2020 THER BEHAV SVC, PER DIEM: HCPCS

## 2020-09-04 NOTE — BH NOTE
Group Therapy Note    Date: 9/4/2020  Start Time: 5612  End Time:  1630  Number of Participants: 3    Type of Group: Psychoeducation    Patient's Goal:  To learn and discuss healthy coping skills that start with each letter of the alphabet and to apply to our lives. Notes:  Pt actively participated in the group discussion and activity. Pt needed redirection at times to stay on topic and not to monopolize the group.     Status After Intervention:  Unchanged    Participation Level: Interactive and Monopolizing    Participation Quality: Sharing      Speech:  pressured      Thought Process/Content: Flight of ideas  Perseverating      Affective Functioning: Flat      Mood: anxious      Level of consciousness:  Alert and Oriented x4      Response to Learning: Progressing to goal      Endings: None Reported    Modes of Intervention: Education, Support, Socialization, Exploration, Clarifying and Activity      Discipline Responsible: /Counselor      Signature:  JULISA Dahl

## 2020-09-04 NOTE — GROUP NOTE
Group Therapy Note    Date: 9/4/2020    Group Start Time:  2:00 PM  Group End Time:  3:00 PM  Group Topic: Psychoeducation    [facility]    Vinita Garcia, [facility]    Group Therapy Note    Attendees: 3    Patients were educated about anxiety and fear as they read several handouts together during group. Group discussed ways they cab cope with anxiety and practiced a diaphragmatic breathing exercise with therapist support. Notes:  Pt was engaged in group and tangential in his verbal responses. Pt required redirections to stay on topic during group as he had flight of ideas in his responses. Pt appeared to be respectful of peers in his language though was monopolizing the conversation and was redirected to allow others to share 1x during group.       Status After Intervention:  Unchanged    Participation Level: Monopolizing    Participation Quality: Sharing      Speech:  pressured      Thought Process/Content: Flight of ideas  Perseverating      Affective Functioning: Flat      Mood: anxious and depressed      Level of consciousness:  Alert and Oriented x4      Response to Learning: Progressing to goal      Endings: None Reported    Modes of Intervention: Education, Support, Socialization, Exploration, Clarifying, Problem-solving and Activity      Discipline Responsible: /Counselor      Signature:  Vinita Garcia, SUMANTH-S, R-DMT

## 2020-09-04 NOTE — GROUP NOTE
Group Therapy Note    Date: 9/4/2020    Group Start Time: 12:30 PM  Group End Time:  1:45 PM  Group Topic: Psychotherapy    [facility]    Tamara Vincent, ATR, MAAT        Group Therapy Note    Attendees: 3         Patient's Goal:  Patient was invited to participate in Psychotherapy group and to set a goal at the beginning of session to practice in group a new way of being in relationships. Notes:  Norman shared his goal was to have \"more mindfulness and acceptance. \" Jazz Long had rapid, pressured speech and repeatedly spoke over peers, telling them \"it will all get better. \" Jazz Long was respectful of limit setting from therapist, but left group around 1:15 pm as he appeared to be unable to tolerate sitting still and limiting speech, as evidenced by Norman's rapid motor movement of bounding his leg and starting and then stopping himself from talking while peers were talking.     Status After Intervention:  Unchanged    Participation Level: Interactive and Monopolizing    Participation Quality: Sharing, Supportive and Intrusive      Speech:  pressured      Thought Process/Content: Flight of ideas  Perseverating      Affective Functioning: Constricted/Restricted      Mood: anxious and euphoric      Level of consciousness:  Alert, Oriented x4 and Inattentive      Response to Learning: Able to verbalize current knowledge/experience, Able to verbalize/acknowledge new learning, Able to retain information and Capable of insight      Endings: None Reported    Modes of Intervention: Education, Support, Socialization, Exploration, Clarifying and Problem-solving      Discipline Responsible: Psychoeducational Specialist      Signature:  Rina Jeter,, [address] [facility]

## 2020-09-07 ENCOUNTER — APPOINTMENT (OUTPATIENT)
Dept: PSYCHIATRY | Age: 30
End: 2020-09-07
Payer: MEDICARE

## 2020-09-09 ENCOUNTER — APPOINTMENT (OUTPATIENT)
Dept: GENERAL RADIOLOGY | Age: 30
End: 2020-09-09
Payer: MEDICARE

## 2020-09-09 ENCOUNTER — HOSPITAL ENCOUNTER (OUTPATIENT)
Dept: PSYCHIATRY | Age: 30
Setting detail: THERAPIES SERIES
Discharge: HOME | End: 2020-09-09
Payer: MEDICARE

## 2020-09-09 ENCOUNTER — HOSPITAL ENCOUNTER (EMERGENCY)
Age: 30
Discharge: HOME | End: 2020-09-09
Payer: MEDICARE

## 2020-09-09 VITALS
SYSTOLIC BLOOD PRESSURE: 130 MMHG | BODY MASS INDEX: 28.71 KG/M2 | TEMPERATURE: 98.5 F | DIASTOLIC BLOOD PRESSURE: 80 MMHG | HEART RATE: 80 BPM | WEIGHT: 212 LBS | RESPIRATION RATE: 16 BRPM | OXYGEN SATURATION: 99 % | HEIGHT: 72 IN

## 2020-09-09 VITALS — TEMPERATURE: 98.6 F

## 2020-09-09 PROCEDURE — H2020 THER BEHAV SVC, PER DIEM: HCPCS

## 2020-09-09 PROCEDURE — 99283 EMERGENCY DEPT VISIT LOW MDM: CPT

## 2020-09-09 PROCEDURE — 6370000000 HC RX 637 (ALT 250 FOR IP): Performed by: NURSE PRACTITIONER

## 2020-09-09 PROCEDURE — 73502 X-RAY EXAM HIP UNI 2-3 VIEWS: CPT

## 2020-09-09 PROCEDURE — 73030 X-RAY EXAM OF SHOULDER: CPT

## 2020-09-09 RX ORDER — NAPROXEN 500 MG/1
500 TABLET ORAL 2 TIMES DAILY
Qty: 20 TABLET | Refills: 0 | Status: SHIPPED | OUTPATIENT
Start: 2020-09-09 | End: 2020-12-12 | Stop reason: ALTCHOICE

## 2020-09-09 RX ORDER — NAPROXEN 250 MG/1
250 TABLET ORAL ONCE
Status: COMPLETED | OUTPATIENT
Start: 2020-09-09 | End: 2020-09-09

## 2020-09-09 RX ADMIN — NAPROXEN 250 MG: 250 TABLET ORAL at 14:32

## 2020-09-09 ASSESSMENT — PAIN DESCRIPTION - LOCATION: LOCATION: HIP;SHOULDER

## 2020-09-09 ASSESSMENT — ENCOUNTER SYMPTOMS
COLOR CHANGE: 0
SORE THROAT: 0
RHINORRHEA: 0
SHORTNESS OF BREATH: 0
ABDOMINAL PAIN: 0

## 2020-09-09 ASSESSMENT — PAIN SCALES - GENERAL
PAINLEVEL_OUTOF10: 7
PAINLEVEL_OUTOF10: 8

## 2020-09-09 ASSESSMENT — PAIN DESCRIPTION - DESCRIPTORS: DESCRIPTORS: ACHING

## 2020-09-09 ASSESSMENT — PAIN DESCRIPTION - ORIENTATION: ORIENTATION: LEFT

## 2020-09-09 ASSESSMENT — PAIN DESCRIPTION - FREQUENCY: FREQUENCY: CONTINUOUS

## 2020-09-09 ASSESSMENT — PAIN DESCRIPTION - PAIN TYPE: TYPE: ACUTE PAIN
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
## 2020-09-09 NOTE — BH NOTE
Treatment team met on 9/8/20 to discuss patient's treatment goals, progress and plan. Patient had an IOP Treatment Agreement made up on 9/4/20. Patient has been removing himself from groups to decrease the distractions when he is unable to sit still and focus. Patient is hyper verbal.  He is doing much better at following the rules and redirection. Plan is to continue IOP 3x week until 9/2/20 when he is scheduled to discharge.
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
## 2020-09-09 NOTE — GROUP NOTE
Group Therapy Note    Date: 9/9/2020    Group Start Time: 12:30 PM  Group End Time:  1:45 PM  Group Topic: Psychotherapy    [facility]    Mery Arellano, [facility]    Group Therapy Note    Attendees: 4    Patient shared and set a goal at the beginning of group to practice a new way of being in group today. Notes:  Pt set goal \"be mindful of others and get my mental health back in order\". Pt left group after about 10min. Reported he felt his shoulder was disconnected from his bull riding activity. Therapist encouraged pt to speak to program RN before leaving for ED.     Status After Intervention:  Unchanged    Participation Level: Minimal    Participation Quality: Intrusive      Speech:  pressured      Thought Process/Content: Flight of ideas      Affective Functioning: Constricted/Restricted      Mood: anxious      Level of consciousness:  Alert      Response to Learning: Progressing to goal      Endings: None Reported    Modes of Intervention: Education, Support, Socialization, Exploration, Clarifying and Problem-solving      Discipline Responsible: /Counselor      Signature:  Mery Arellano, SUMANTH-S, TERESO

## 2020-09-11 ENCOUNTER — HOSPITAL ENCOUNTER (OUTPATIENT)
Dept: PSYCHIATRY | Age: 30
Setting detail: THERAPIES SERIES
Discharge: HOME | End: 2020-09-11
Payer: MEDICARE

## 2020-09-11 VITALS — TEMPERATURE: 98.6 F

## 2020-09-11 PROCEDURE — H2020 THER BEHAV SVC, PER DIEM: HCPCS | Performed by: COUNSELOR

## 2020-09-11 NOTE — GROUP NOTE
Group Therapy Note    Date: 9/11/2020    Group Start Time:  2:00 PM  Group End Time:  3:00 PM  Group Topic: Psychoeducation    [facility]  LIEN Pedro, RUDY BILLINGSLEY      Group Therapy Note    Attendees: 5         Patient's Goal:  To learn about relationships and effective communication/conflict management skills necessary to foster a healthy relationship with others. Notes:  Pt reported that he knew about \"I\" statements and other strategies being discussed. Pt would get off topic and require redirection. Pt struggled to relate skills to personal experiences as prompted. Pt excused himself to use the rest room and was absent for a short span of time; after group therapist spoke to nurse whom reported pt was attempting to go to ED to get a note and was sent back to group.     Status After Intervention:  Unchanged    Participation Level: Monopolizing    Participation Quality: Intrusive      Speech:  loud      Thought Process/Content: Flight of ideas      Affective Functioning: Flat      Mood: elevated and euphoric      Level of consciousness:  Alert and Oriented x4      Response to Learning: Able to verbalize current knowledge/experience      Endings: None Reported    Modes of Intervention: Education, Support, Socialization, Exploration, Clarifying, Problem-solving, Confrontation, Limit-setting and Reality-testing      Discipline Responsible: /Counselor      Jaz Roller, IMFT, [place], LSW

## 2020-09-11 NOTE — BH NOTE
Writer got a call from the outpatient pharmacy, apparently patient is standing down stairs at the window requesting a prescription of tramadol. According to Bette Leary pharmacist, patient reports having had an allergic reaction to his naproxen and states that the therapy team told him to go down to the pharmacy and ask for something else. Writer met with the members of the therapy team to discuss. Patient was taken aside and spoken to about going to the pharmacy asking for a narcotic stating that therapy team told him to go down there. Patient denies saying the therapy team sent him down, he states that he did not know tramadol was a narcotic. Patient became irritable and defensive. He states that the whole situation was messed up. Patient had originally told writer that he was going down to the ER to obtain a note for his [facility]  that brings him here. Patient became so upset, irritable and angry that he was asked by writer to leave for the day. Patient notified that the team will meet on Monday and discuss if he is appropriate to continue IOP group therapy.

## 2020-09-11 NOTE — GROUP NOTE
Group Therapy Note    Date: 9/11/2020    Group Start Time: 12:30 PM  Group End Time:  1:45 PM  Group Topic: Psychotherapy    [facility]    Dorie Fajardo, [facility]        Group Therapy Note    Attendees: 5         Patient's Goal:  Pt's goal was to be mindful of others and allow others to speak. Notes:  Pt was engaged in group. Pt shared that he has talked with family and they decided that he can bull ride if he gets his mental health under control. Pt shared how he had to set boundaries with an ex. Pt left group after 45 minutes to use the rest room. Pt met goal.     Status After Intervention:  Improved    Participation Level:  Active Listener and Interactive    Participation Quality: Appropriate, Attentive and Sharing      Speech:  pressured      Thought Process/Content: Perseverating      Affective Functioning: Exaggerated      Mood: elevated      Level of consciousness:  Alert      Response to Learning: Able to verbalize current knowledge/experience and Progressing to goal      Endings: None Reported    Modes of Intervention: Education, Support, Socialization, Exploration, Clarifying, Problem-solving, Activity, Movement, Confrontation, Limit-setting and Reality-testing      Discipline Responsible: /Counselor      Signature:  Dorie Fajardo, [facility]

## 2020-09-14 PROCEDURE — 99215 OFFICE O/P EST HI 40 MIN: CPT | Performed by: PSYCHIATRY & NEUROLOGY

## 2020-09-15 NOTE — BH NOTE
Spoke with patient via telephone who began to state that he is going to follow up with [facility] for their IOP program.      Maryana Adamson was unable to give patient aftercare information because he already has it set up. He was made aware that [facility] only is doing walk-ins at this time. Thursdays from [id number]. Patient verbalized understanding. Patient was advised that he has been administratively discharged from our IOP program due to patient not being appropriate during groups and/or while at the hospital in general.  Patient did verbalized understanding. Will mail discharge instructions to patient.

## 2020-09-15 NOTE — DISCHARGE SUMMARY
Discharge Summary   Admit Date: 8/31/2020   Discharge Date:    9/15/2020   Administratively discharged  due to inappropriate behaviors in groups. Spent over 40 minutes with patient and staff on [facility]   Final Dx: axis I: Bipolar affective disorder, current episode hypomanic ([address]                             Austistic Spectrum DO   Axis 2: Personality Disorder Not Otherwise Specified  Elle 3: See Medical History    And Present on Admission:   Autistic spectrum disorder   Bipolar affective disorder, current episode hypomanic ([address]     Axis 4: Other psychosocial and environmental problems  Axis 5:  On Admission: 41-50 serious symptoms At Discharge: 51-60 moderate symptoms   All conditions on Axis 1 and Axis 2 and active problems on Axis 3 were treated while patient was hospitalized. [facility] Problems    Diagnosis Date Noted    Autistic spectrum disorder [F84.0] 01/13/2016    Bipolar affective disorder, current episode hypomanic ([address] [F31.0] 03/11/2011   )   Condition on DC  Mood and affect are stable and pt is not suicidal   VITALS:  There were no vitals taken for this visit. Brief Summary Present Illness  CHIEF COMPLAINT:  \"I need to find peace and I need to have help with my  medicine change. \"     HISTORY OF PRESENT ILLNESS:  He has been having symptoms since age 9  and the patient was seen in our facility in 2017. At that time, he was  diagnosed with bipolar disorder and autistic spectrum disorder. He has  been out of the area for a number of years. He stated that he would  like help with general functioning. He wants to learn ways to help cope  with stress and wants to learn coping skills. Apparently, he is close  to his mom and called her during the intake as well as when I spoke with  him today. He is given paperwork to complete.     He states I \"don't appreciate the bipolar disorder diagnosis. \"  He  states he has not been on medications for three years until recently.    He was placed on Vraylar as well as Xanax p.r.n. He spoke in a very  pressured and sometimes rapid pace. He had difficulty awaiting his turn  to speak and was very focused on his own agenda. At one point, he  impulsively stood up, pulled up his shirt to show me the skin fold that  he had from his weight loss. He states he went from 346 pounds to 212  pounds currently. He is not having any threats of self-harm or harm  toward others. He denied auditory or visual hallucinations. Insight  and judgment impaired. Denied any self-harm threats or harm toward  others. Hospital Course   Per [facility] note 9/14/2020:   Supervisor, writer, reviewed patient's documentation and program attendance. Jaky Barkley has been unable to remain present for the entire duration of group (in any session) and continues to be easily distracted; roaming the hospital, visiting other departments, etc. As documented on 9/11/20, Jaky Barkley left group prematurely, stating to the group facilitator Liban Zendejas) that he was having an allergic reaction to his anxiety medication. He reported to OLVIN Mustafa at the Outpatient  that he was leaving to [facility] get something from his ride (transportation). At that time, Jaky Barkley visited the Outpatient Pharmacy. He reported to the pharmacist that therapy staff told him to [facility] ask for something different (different medication). According to Ga Herron (Pharmacist) in pharmacy, [facility] entire demeanor changed when he began to demand he be given Tramadol. At that time, the pharmacist called Outpatient to report the situation. Jaky Barkley returned to Outpatient and began slamming doors and yelling at the [employer].  He then returned to the Pharmacy prior to leaving the hospital (as Moon had instructed him to do).      Given the circumstances of last Friday, further discussion with team members, Dr. Jacquelyn Teague and Bridger Tabares [facility] Director) and feedback given by [facility] group peers today (i.e. how the group environment was different compared to previous days in treatment) we have determined that Fani Carmona would benefit from an alternative form of treatment.      Sherrie, Outpatient RN, is working on constructing a discharge plan that will include Psychiatry, Therapy and likely Case Management. We will make every attempt to find him an alternative day program that is more appropriate to his needs. Fani Carmona will be administratively discharged from the [facility] effective 9/14/2020    Patient was discharged to home to continue recovery in the community. PE: (reviewed) and labs (see medical H&PE)  Labs:    No visits with results within 2 Day(s) from this visit.    Latest known visit with results is:   Admission on 10/04/2018, Discharged on 10/04/2018   Component Date Value Ref Range Status    WBC 10/04/2018 8.3  4.0 - 11.0 K/uL Final    RBC 10/04/2018 4.83  4.20 - 5.90 M/uL Final    Hemoglobin 10/04/2018 15.0  13.5 - 17.5 g/dL Final    Hematocrit 10/04/2018 44.3  40.5 - 52.5 % Final    MCV 10/04/2018 91.8  80.0 - 100.0 fL Final    MCH 10/04/2018 31.0  26.0 - 34.0 pg Final    MCHC 10/04/2018 33.7  31.0 - 36.0 g/dL Final    RDW 10/04/2018 12.2* 12.4 - 15.4 % Final    Platelets 21/59/3067 280  135 - 450 K/uL Final    MPV 10/04/2018 8.1  5.0 - 10.5 fL Final    Neutrophils % 10/04/2018 57.9  % Final    Lymphocytes % 10/04/2018 27.6  % Final    Monocytes % 10/04/2018 9.8  % Final    Eosinophils % 10/04/2018 4.0  % Final    Basophils % 10/04/2018 0.7  % Final    Neutrophils Absolute 10/04/2018 4.8  1.7 - 7.7 K/uL Final    Lymphocytes Absolute 10/04/2018 2.3  1.0 - 5.1 K/uL Final    Monocytes Absolute 10/04/2018 0.8  0.0 - 1.3 K/uL Final    Eosinophils Absolute 10/04/2018 0.3  0.0 - 0.6 K/uL Final    Basophils Absolute 10/04/2018 0.1  0.0 - 0.2 K/uL Final    Sodium 10/04/2018 141  136 - 145 mmol/L Final    Potassium reflex Magnesium 10/04/2018 4.1  3.5 - 5.1 mmol/L Final    Chloride 10/04/2018 101  99 - 110 mmol/L Final    CO2 10/04/2018 27  21 - 32 mmol/L Final    Anion Gap 10/04/2018 13  3 - 16 Final    Glucose 10/04/2018 92  70 - 99 mg/dL Final    BUN 10/04/2018 12  7 - 20 mg/dL Final    CREATININE 10/04/2018 0.8* 0.9 - 1.3 mg/dL Final    GFR Non- 10/04/2018 >60  >60 Final    Comment: >60 mL/min/1.73m2 EGFR, calc. for ages 25 and older using the  MDRD formula (not corrected for weight), is valid for stable  renal function.  GFR  10/04/2018 >60  >60 Final    Comment: Chronic Kidney Disease: less than 60 ml/min/1.73 sq.m. Kidney Failure: less than 15 ml/min/1.73 sq.m. Results valid for patients 18 years and older.  Calcium 10/04/2018 9.8  8.3 - 10.6 mg/dL Final    Total Protein 10/04/2018 7.9  6.4 - 8.2 g/dL Final    Alb 10/04/2018 4.5  3.4 - 5.0 g/dL Final    Albumin/Globulin Ratio 10/04/2018 1.3  1.1 - 2.2 Final    Total Bilirubin 10/04/2018 1.0  0.0 - 1.0 mg/dL Final    Alkaline Phosphatase 10/04/2018 54  40 - 129 U/L Final    ALT 10/04/2018 23  10 - 40 U/L Final    AST 10/04/2018 34  15 - 37 U/L Final    Globulin 10/04/2018 3.4  g/dL Final    Blood Culture, Routine 10/04/2018 No growth after 5 days of incubation. Final    Culture, Blood 2 10/04/2018 No growth after 5 days of incubation.    Final    Lactic Acid 10/04/2018 0.7  0.4 - 2.0 mmol/L Final    Troponin 10/04/2018 <0.01  <0.01 ng/mL Final    Methodology by Troponin T    Ventricular Rate 10/04/2018 68  BPM Final    Atrial Rate 10/04/2018 68  BPM Final    P-R Interval 10/04/2018 130  ms Final    QRS Duration 10/04/2018 84  ms Final    Q-T Interval 10/04/2018 416  ms Final    QTc Calculation (Bazett) 10/04/2018 442  ms Final    P Axis 10/04/2018 47  degrees Final    R Axis 10/04/2018 74  degrees Final    T Axis 10/04/2018 22  degrees Final    Diagnosis 10/04/2018 Normal sinus rhythm with sinus arrhythmiaNormal ECGConfirmed by Arnold Rebolledo MD, Jean Taylor (1986) on 10/5/2018 5:35:44 AM   Final        Mental Status Exam at Discharge:  Level of consciousness:  awake  Appearance:  well-appearing, in chair, good grooming and good hygiene well-developed, well-nourished  Behavior/Motor:  no abnormalities noted normal gait and station AIMS: 0  Attitude toward examiner:  cooperative, attentive and good eye contact  Speech:  spontaneous, rapid rate, loud volume and well articulated  Mood:  Elevated and irritable  Affect:  mood congruent  Hallucinations: Absent  Thought processes:  coherent Attention span, Concentration & Attention:  attention span and concentration were age appropriate  Thought content:   no evidence of delusions OCD: none    Insight: limited insight and poor judgment Cognition:  oriented to person, place, and time  Fund of Knowledge: average  IQ:average Memory: intact  Suicide:  No specific plan to harm self  Sleep: sleeps through the night  Appetite: ok   Reassess Medina Risk:  no specific plan to harm self Pt has phone numbers to contact if suicidal thoughts recur and states pt will return to the hospital if suicidal feelings return.      Discharge Meds:    Vraylar 1 mg QD            Disposition - Residence Home    Follow Up:  See Discharge Instructions Ta Fuentes

## 2020-09-16 ENCOUNTER — HOSPITAL ENCOUNTER (OUTPATIENT)
Dept: PSYCHIATRY | Age: 30
Setting detail: THERAPIES SERIES
Discharge: HOME | End: 2020-09-16
Payer: MEDICARE

## 2020-09-18 ENCOUNTER — APPOINTMENT (OUTPATIENT)
Dept: PSYCHIATRY | Age: 30
End: 2020-09-18
Payer: MEDICARE

## 2020-09-21 ENCOUNTER — APPOINTMENT (OUTPATIENT)
Dept: PSYCHIATRY | Age: 30
End: 2020-09-21
Payer: MEDICARE

## 2020-09-23 ENCOUNTER — APPOINTMENT (OUTPATIENT)
Dept: PSYCHIATRY | Age: 30
End: 2020-09-23
Payer: MEDICARE

## 2020-09-25 ENCOUNTER — APPOINTMENT (OUTPATIENT)
Dept: PSYCHIATRY | Age: 30
End: 2020-09-25
Payer: MEDICARE

## 2020-09-28 ENCOUNTER — APPOINTMENT (OUTPATIENT)
Dept: PSYCHIATRY | Age: 30
End: 2020-09-28
Payer: MEDICARE

## 2020-09-30 ENCOUNTER — APPOINTMENT (OUTPATIENT)
Dept: PSYCHIATRY | Age: 30
End: 2020-09-30
Payer: MEDICARE

## 2020-10-20 ENCOUNTER — APPOINTMENT (OUTPATIENT)
Dept: GENERAL RADIOLOGY | Age: 30
End: 2020-10-20
Payer: MEDICARE

## 2020-10-20 ENCOUNTER — HOSPITAL ENCOUNTER (EMERGENCY)
Age: 30
Discharge: HOME | End: 2020-10-20
Payer: MEDICARE

## 2020-10-20 VITALS
SYSTOLIC BLOOD PRESSURE: 169 MMHG | HEIGHT: 72 IN | HEART RATE: 84 BPM | TEMPERATURE: 97.3 F | RESPIRATION RATE: 14 BRPM | WEIGHT: 211 LBS | DIASTOLIC BLOOD PRESSURE: 100 MMHG | BODY MASS INDEX: 28.58 KG/M2 | OXYGEN SATURATION: 100 %

## 2020-10-20 PROCEDURE — 99283 EMERGENCY DEPT VISIT LOW MDM: CPT

## 2020-10-20 PROCEDURE — 6370000000 HC RX 637 (ALT 250 FOR IP): Performed by: PHYSICIAN ASSISTANT

## 2020-10-20 PROCEDURE — 73562 X-RAY EXAM OF KNEE 3: CPT

## 2020-10-20 RX ORDER — HYDROCODONE BITARTRATE AND ACETAMINOPHEN 5; 325 MG/1; MG/1
1 TABLET ORAL EVERY 8 HOURS PRN
Qty: 5 TABLET | Refills: 0 | Status: SHIPPED | OUTPATIENT
Start: 2020-10-20 | End: 2020-10-22

## 2020-10-20 RX ORDER — HYDROCODONE BITARTRATE AND ACETAMINOPHEN 5; 325 MG/1; MG/1
1 TABLET ORAL ONCE
Status: COMPLETED | OUTPATIENT
Start: 2020-10-20 | End: 2020-10-20

## 2020-10-20 RX ORDER — NAPROXEN 500 MG/1
500 TABLET ORAL 2 TIMES DAILY
Qty: 20 TABLET | Refills: 0 | Status: SHIPPED | OUTPATIENT
Start: 2020-10-20 | End: 2020-12-12

## 2020-10-20 RX ADMIN — HYDROCODONE BITARTRATE AND ACETAMINOPHEN 1 TABLET: 5; 325 TABLET ORAL at 20:53

## 2020-10-20 ASSESSMENT — PAIN SCALES - GENERAL
PAINLEVEL_OUTOF10: 8
PAINLEVEL_OUTOF10: 10
PAINLEVEL_OUTOF10: 10

## 2020-10-20 ASSESSMENT — PAIN DESCRIPTION - PAIN TYPE: TYPE: ACUTE PAIN

## 2020-10-21 ASSESSMENT — ENCOUNTER SYMPTOMS
CONSTIPATION: 0
CHEST TIGHTNESS: 0
EYE REDNESS: 0
RHINORRHEA: 0
SHORTNESS OF BREATH: 0
SORE THROAT: 0
NAUSEA: 0
SINUS PRESSURE: 0
EYE DISCHARGE: 0
DIARRHEA: 0
ABDOMINAL PAIN: 0
SINUS PAIN: 0
COUGH: 0
VOMITING: 0

## 2020-10-21 NOTE — ED PROVIDER NOTES
Evaluated by Advanced Practice Provider          [facility]  EMERGENCY DEPARTMENT ENCOUNTER      This patient was not seen and evaluated by the attending physician No att. providers found. I have independently evaluated this patient. Pt Name: Dwayne Ash  MRN: [id number]  [place] 1990  Dateof evaluation: 10/20/2020  Provider: Britney Saini PA-C  PCP: SHAILA Fischer - [id number]       Chief Complaint   Patient presents with    Knee Pain     pt tripped over dog and hit right knee into wall       HISTORY OF PRESENTILLNESS   (Location/Symptom, Timing/Onset, Context/Setting, Quality, Duration, Modifying Factors, Severity)  Note limiting factors. Dwayne Ash is a 35 y.o. male for evaluation via private vehicle of the 2-day history of an injury to his right knee when his dog bumped him into the wall. He indicates that he is having 10 out of 10 pain throbbing aching worse with attempted weightbearing patient indicates he has a history of injuries to this leg as he has history of MMA fighting. Nursing Notes were all reviewed and agreed with or any disagreements were addressed  in the HPI. REVIEW OF SYSTEMS    (2-9 systems for level 4, 10 or more for level 5)     Review of Systems   Constitutional: Negative for chills and fever. HENT: Negative. Negative for congestion, rhinorrhea, sinus pressure, sinus pain and sore throat. Eyes: Negative for discharge, redness and visual disturbance. Respiratory: Negative for cough, chest tightness and shortness of breath. Cardiovascular: Negative for chest pain and palpitations. Gastrointestinal: Negative for abdominal pain, constipation, diarrhea, nausea and vomiting. Genitourinary: Negative for difficulty urinating, dysuria and frequency. Musculoskeletal: Positive for arthralgias and myalgias. Skin: Negative. Neurological: Negative. Negative for dizziness, weakness, numbness and headaches. Emergency Department immediately if new or worsening symptoms occur. We have discussed the symptoms which are most concerning (e.g., changing or worsening pain, numbness, weakness) that necessitate immediate return. FINAL IMPRESSION      1. Strain of right knee, initial encounter    2. Knee effusion, right    3. Elevated blood pressure reading          DISPOSITION/PLAN   DISPOSITION Decision To Discharge 10/20/2020 08:42:21 PM      PATIENT REFERRED TO:  Kwesi Abarca, APRN - CNP  [address] [place] [address]          [facility], [address] [facility] [address]  [phone]            DISCHARGE MEDICATIONS:  Discharge Medication List as of 10/20/2020  9:04 PM      START taking these medications    Details   HYDROcodone-acetaminophen (NORCO) 5-325 MG per tablet Take 1 tablet by mouth every 8 hours as needed for Pain (only for most severe pain) for up to 5 doses.  Sedation precautions please, Disp-5 tablet,R-0Print             DISCONTINUED MEDICATIONS:  Discharge Medication List as of 10/20/2020  9:04 PM                 (Please note that portions of this note were completed with a voice recognition program.  Efforts were made to edit the dictations but occasionally words are mis-transcribed.)    Lynne Koo PA-C (electronically signed)          Lynne Koo PA-C  10/21/20 0703

## 2020-12-10 ENCOUNTER — HOSPITAL ENCOUNTER (EMERGENCY)
Age: 30
Discharge: LEFT BEFORE DISCHARGE | End: 2020-12-10
Payer: MEDICARE

## 2020-12-12 ENCOUNTER — HOSPITAL ENCOUNTER (EMERGENCY)
Age: 30
Discharge: HOME | End: 2020-12-12
Attending: EMERGENCY MEDICINE
Payer: MEDICARE

## 2020-12-12 ENCOUNTER — APPOINTMENT (OUTPATIENT)
Dept: GENERAL RADIOLOGY | Age: 30
End: 2020-12-12
Payer: MEDICARE

## 2020-12-12 VITALS
BODY MASS INDEX: 27.12 KG/M2 | WEIGHT: 200 LBS | RESPIRATION RATE: 16 BRPM | SYSTOLIC BLOOD PRESSURE: 146 MMHG | DIASTOLIC BLOOD PRESSURE: 85 MMHG | HEART RATE: 84 BPM | OXYGEN SATURATION: 96 % | TEMPERATURE: 98.6 F

## 2020-12-12 LAB
RAPID INFLUENZA  B AGN: NEGATIVE
RAPID INFLUENZA A AGN: NEGATIVE

## 2020-12-12 PROCEDURE — 96372 THER/PROPH/DIAG INJ SC/IM: CPT

## 2020-12-12 PROCEDURE — 99284 EMERGENCY DEPT VISIT MOD MDM: CPT

## 2020-12-12 PROCEDURE — 87804 INFLUENZA ASSAY W/OPTIC: CPT

## 2020-12-12 PROCEDURE — 6360000002 HC RX W HCPCS: Performed by: EMERGENCY MEDICINE

## 2020-12-12 PROCEDURE — 71045 X-RAY EXAM CHEST 1 VIEW: CPT

## 2020-12-12 RX ORDER — KETOROLAC TROMETHAMINE 30 MG/ML
30 INJECTION, SOLUTION INTRAMUSCULAR; INTRAVENOUS ONCE
Status: COMPLETED | OUTPATIENT
Start: 2020-12-12 | End: 2020-12-12

## 2020-12-12 RX ADMIN — KETOROLAC TROMETHAMINE 30 MG: 30 INJECTION, SOLUTION INTRAMUSCULAR at 20:19

## 2020-12-12 ASSESSMENT — PAIN DESCRIPTION - DESCRIPTORS: DESCRIPTORS: ACHING

## 2020-12-12 ASSESSMENT — PAIN DESCRIPTION - LOCATION: LOCATION: HEAD;BACK;SHOULDER

## 2020-12-12 ASSESSMENT — PAIN DESCRIPTION - PAIN TYPE
TYPE: ACUTE PAIN
TYPE: ACUTE PAIN

## 2020-12-12 ASSESSMENT — PAIN DESCRIPTION - ORIENTATION: ORIENTATION: RIGHT

## 2020-12-12 ASSESSMENT — PAIN SCALES - GENERAL
PAINLEVEL_OUTOF10: 6
PAINLEVEL_OUTOF10: 10

## 2020-12-13 NOTE — ED PROVIDER NOTES
name: N/A    Number of children: 0    Years of education: 15    Highest education level: Not on file   Occupational History    Occupation: works on farm     Comment: Farm work   Social Needs    Financial resource strain: Not on file    Food insecurity     Worry: Not on file     Inability: Not on file   [employer] needs     Medical: Not on file     Non-medical: Not on file   Tobacco Use    Smoking status: Heavy Tobacco Smoker     Packs/day: 0.50     Years: 10.00     Pack years: 5.00     Types: Cigarettes    Smokeless tobacco: Current User     Types: Chew   Substance and Sexual Activity    Alcohol use: Not Currently     Alcohol/week: 2.0 standard drinks     Types: 2 Cans of beer per week     Comment: Occasional, \"2x last month\"    Drug use: Not Currently     Comment: pot one year ago    Sexual activity: Yes     Partners: Female   Lifestyle    Physical activity     Days per week: Not on file     Minutes per session: Not on file    Stress: Not on file   Relationships    Social connections     Talks on phone: Not on file     Gets together: Not on file     Attends Latter day service: Not on file     Active member of club or organization: Not on file     Attends meetings of clubs or organizations: Not on file     Relationship status: Not on file    Intimate partner violence     Fear of current or ex partner: Not on file     Emotionally abused: Not on file     Physically abused: Not on file     Forced sexual activity: Not on file   Other Topics Concern    Not on file   Social History Narrative    Not on file     No current facility-administered medications for this encounter.       Current Outpatient Medications   Medication Sig Dispense Refill    naproxen (NAPROSYN) 500 MG tablet Take 1 tablet by mouth 2 times daily for 20 doses 20 tablet 0    naproxen (NAPROSYN) 500 MG tablet Take 1 tablet by mouth 2 times daily for 20 doses 20 tablet 0     No Known Allergies    REVIEW OF SYSTEMS  10 systems reviewed, pertinent positives per HPI otherwise noted to be negative     PHYSICAL EXAM  There were no vitals taken for this visit. GENERAL APPEARANCE: Awake and alert. Cooperative. In no obvious distress. HEAD: Normocephalic. Atraumatic. EYES: PERRL. EOM's grossly intact. ENT: Mucous membranes are pink and moist.   NECK: Supple. HEART: RRR. No murmurs. LUNGS: Respirations unlabored. CTAB. Good air exchange. ABDOMEN: Soft. Non-distended. Non-tender. No masses. No organomegaly. No guarding or rebound. EXTREMITIES: No peripheral edema. Moves all extremities equally. All extremities neurovascularly intact. SKIN: Warm and dry. No acute rashes. BACK: Patient has pain in the muscle groups of the lower right lumbar spine. He is got no pain on the lumbar midline. NEUROLOGICAL: Alert and oriented. Strength 5/5, sensation intact. Gait normal.   PSYCHIATRIC: Normal mood and affect. No HI or SI expressed to me. RADIOLOGY    If acquired see below     EKG:     If acquired see below       ED COURSE/MDM    So further complaints something to place the patient on nasal spray for the nasal stuffiness. Negative chest radiograph to make sure he does not have pneumonia. There is no need for a lumbar radiograph since his pain is primarily in the paraspinal muscle groups. But we will give him some Tylenol for that. ED Course as of Dec 12 2044   Sat Dec 12, 2020   2030    IMPRESSION:  Radiographically clear lungs. XR CHEST PORTABLE [DL]   2040 Influenza a and B is negative   Rapid influenza A/B antigens:    Rapid Influenza A Ag Negative   Rapid Influenza B Ag Negative [DL]      ED Course User Index  [DL] Ashlee Crews MD       The ED course and plan were reviewed and results discussed with the patient    The patient understood and agreed with the Discharge/transfer planning.     CLINICAL IMPRESSION and DISPOSITION    Norman Rucker Cons was stable and diagnosed with viral infection    Patient was treated with Anuradha Arango MD  12/12/20 2045

## 2020-12-13 NOTE — ED NOTES
Pt DC home in good condition with family. V/u of Dc instructions. Denies questions or concerns. Teaching done re: s/s to report.      Sharmila Garland RN  12/12/20 2100

## 2020-12-13 NOTE — ED NOTES
Pt states he feels the same pain 10/10 states its no better, nurse had felt the pt stated during triage this pain was 6/10 however pt states now no it was 10/10. Nurse spoke with Dr. Bhavin Díaz regarding pain level he states to continue with d/c instructions which states to rotate tylenol motrin and fluids.       Paula Lindo RN  12/12/20 3146

## 2020-12-27 ENCOUNTER — HOSPITAL ENCOUNTER (EMERGENCY)
Age: 30
Discharge: LEFT BEFORE DISCHARGE | End: 2020-12-27
Attending: STUDENT IN AN ORGANIZED HEALTH CARE EDUCATION/TRAINING PROGRAM
Payer: MEDICARE

## 2020-12-27 VITALS
SYSTOLIC BLOOD PRESSURE: 146 MMHG | HEART RATE: 71 BPM | WEIGHT: 189 LBS | RESPIRATION RATE: 18 BRPM | HEIGHT: 72 IN | DIASTOLIC BLOOD PRESSURE: 88 MMHG | BODY MASS INDEX: 25.6 KG/M2 | TEMPERATURE: 98.4 F | OXYGEN SATURATION: 99 %

## 2020-12-27 LAB
BILIRUBIN URINE: NEGATIVE
BLOOD, URINE: NEGATIVE
CLARITY: CLEAR
COLOR: YELLOW
GLUCOSE URINE: NEGATIVE MG/DL
KETONES, URINE: NEGATIVE MG/DL
LEUKOCYTE ESTERASE, URINE: NEGATIVE
MICROSCOPIC EXAMINATION: NORMAL
NITRITE, URINE: NEGATIVE
PH UA: 7 (ref 5–8)
PROTEIN UA: NEGATIVE MG/DL
SPECIFIC GRAVITY UA: 1.01 (ref 1–1.03)
URINE REFLEX TO CULTURE: NORMAL
URINE TYPE: NORMAL
UROBILINOGEN, URINE: 0.2 E.U./DL

## 2020-12-27 PROCEDURE — 99284 EMERGENCY DEPT VISIT MOD MDM: CPT

## 2020-12-27 PROCEDURE — 81003 URINALYSIS AUTO W/O SCOPE: CPT

## 2020-12-27 ASSESSMENT — PAIN DESCRIPTION - LOCATION: LOCATION: GROIN;BACK

## 2020-12-27 ASSESSMENT — PAIN DESCRIPTION - ORIENTATION: ORIENTATION: LOWER

## 2020-12-27 ASSESSMENT — PAIN SCALES - GENERAL: PAINLEVEL_OUTOF10: 10

## 2020-12-27 ASSESSMENT — PAIN DESCRIPTION - DESCRIPTORS: DESCRIPTORS: ACHING;PRESSURE

## 2020-12-27 NOTE — ED NOTES
Pt came from room, states he was leaving. Informed pt that MD would be happy to see him, but she was still in assessing another pt. Pt states he is not waiting any longer. Asked pt to sign AMA. Pt refused and left. Will inform MD. Charge nurse made aware.      Clark Doan RN  12/27/20 2072

## 2020-12-27 NOTE — ED PROVIDER NOTES
[facility]      CHIEF COMPLAINT  Urinary Tract Infection (pt states he has had pressure and problems with urine stream off and on for 2 weeks, was on bactrim but states he still feels pressure. )       HISTORY OF PRESENT ILLNESS  Quynh Suh is a 35 y.o. male with a past medical history of mild MR, autism, bipolar disorder, kidney stone and urinary hesitancy, who presents to the ED complaining of concern for UTI. Was seen a couple weeks ago for URI symptoms. Was put on Z pack, phenergan, and steroids by CVA. Was seen at urgent care 3 days ago and told he had a UTI. Was started on bactrim. Reports continued dysuria. Also reports feeling of pressure. Has history of difficulty initiating stream. Is requesting Urology follow up. Reports 2 weeks of pain in right groin and testicles. No other complaints, modifying factors or associated symptoms. I have reviewed the following from the nursing documentation.     Past Medical History:   Diagnosis Date    ADHD     Adult respiratory distress syndrome ([address]     ICU     Anxiety     Bipolar disorder     Cognitive disorder     Finger fracture, left 11/22/2017    GERD (gastroesophageal reflux disease)     Hiatal hernia 01/19/2018    Hypertension     Kidney stones     MR (mental retardation), moderate     Nausea and vomiting     Pneumococcal pneumonia (HCC)     Seizures ([address]     pt denied any seizures ever     Past Surgical History:   Procedure Laterality Date    MOUTH SURGERY      MOUTH SURGERY       Family History   Problem Relation Age of Onset    Depression Mother     Mental Illness Maternal Uncle     Arthritis Paternal Grandmother      Social History     Socioeconomic History    Marital status:      Spouse name: N/A    Number of children: 0    Years of education: 12    Highest education level: Not on file   Occupational History    Occupation: works on farm     Comment: Farm work   Social Needs    Financial resource strain: Not on file    Food insecurity     Worry: Not on file     Inability: Not on file    Transportation needs     Medical: Not on file     Non-medical: Not on file   Tobacco Use    Smoking status: Current Some Day Smoker     Packs/day: 0.50     Years: 10.00     Pack years: 5.00     Types: Cigarettes    Smokeless tobacco: Current User     Types: Chew   Substance and Sexual Activity    Alcohol use: Not Currently     Alcohol/week: 2.0 standard drinks     Types: 2 Cans of beer per week     Comment: Occasional, \"2x last month\"    Drug use: Yes     Types: Marijuana     Comment: pot one year ago    Sexual activity: Yes     Partners: Female   Lifestyle    Physical activity     Days per week: Not on file     Minutes per session: Not on file    Stress: Not on file   Relationships    Social connections     Talks on phone: Not on file     Gets together: Not on file     Attends Protestant service: Not on file     Active member of club or organization: Not on file     Attends meetings of clubs or organizations: Not on file     Relationship status: Not on file    Intimate partner violence     Fear of current or ex partner: Not on file     Emotionally abused: Not on file     Physically abused: Not on file     Forced sexual activity: Not on file   Other Topics Concern    Not on file   Social History Narrative    Not on file     No current facility-administered medications for this encounter. Current Outpatient Medications   Medication Sig Dispense Refill    medical marijuana marijuana       No Known Allergies    REVIEW OF SYSTEMS  10 systems reviewed, pertinent positives per HPI otherwise noted to be negative. PHYSICAL EXAM  BP (!) 146/88   Pulse 71   Temp 98.4 °F (36.9 °C) (Oral)   Resp 18   Ht 6' (1.829 m)   Wt 189 lb (85.7 kg)   SpO2 99%   BMI 25.63 kg/m²   GENERAL APPEARANCE: Awake and alert. Cooperative. no distress. HENT: Normocephalic. Atraumatic.  Mucous membranes are moist  NECK: Supple. Full range of motion of the neck without stiffness or pain. EYES: PERRL. EOM's grossly intact. HEART/CHEST: RRR. No murmurs. Chest wall is not tender to palpation. LUNGS: Respirations unlabored. CTAB. Good air exchange. Speaking comfortably in full sentences. ABDOMEN: Suprapubic tenderness. Soft. Non-distended. No masses. No organomegaly. No guarding or rebound. PELVIC: Chaperone present. Right inguinal tenderness, with some swelling but no hernia noted. Mild right sided testicular tenderness, normal lie. Cremasteric reflex intact  MUSCULOSKELETAL: No extremity edema. Compartments soft. No deformity. No tenderness in the extremities. All extremities neurovascularly intact. SKIN: Warm and dry. No acute rashes. NEUROLOGICAL: Alert and oriented. CN's 2-12 intact. No gross facial drooping. Strength 5/5, sensation intact. PSYCHIATRIC: Normal mood and affect. LABS  I have reviewed all labs for this visit. Results for orders placed or performed during the hospital encounter of 12/27/20   Urinalysis Reflex to Culture    Specimen: Urine, clean catch   Result Value Ref Range    Color, UA Yellow Straw/Yellow    Clarity, UA Clear Clear    Glucose, Ur Negative Negative mg/dL    Bilirubin Urine Negative Negative    Ketones, Urine Negative Negative mg/dL    Specific Gravity, UA 1.010 1.005 - 1.030    Blood, Urine Negative Negative    pH, UA 7.0 5.0 - 8.0    Protein, UA Negative Negative mg/dL    Urobilinogen, Urine 0.2 <2.0 E.U./dL    Nitrite, Urine Negative Negative    Leukocyte Esterase, Urine Negative Negative    Microscopic Examination Not Indicated     Urine Type NotGiven     Urine Reflex to Culture Not Indicated        RADIOLOGY  CT scan ordered, patient eloped prior to completion      ED COURSE / MDM  Patient seen and evaluated. Old records reviewed. Labs and imaging reviewed and results discussed with patient.       Overall well appearing patient, in no acute distress, presenting for concern for UTI, he also reports some right inguinal tenderness and testicular pain. Physical exam remarkable for right inguinal swelling and testicular tenderness. Differential diagnosis includes but is not limited to: UTI, hernia, testicular torsion, ependymitis, appendicitis, kidney stone    During the patient's ED course, the patient was given:  Medications - No data to display     Urinalysis did not show evidence of blood or infection. Patient is currently on Abx for UTI diagnosed at [facility]. His symptoms may be due to partially treated UTI. Low suspicion for kidney stone. When I spoke to the patient he complained of right inguinal discomfort and testicular discomfort. Exam did show mild right inguinal swelling without definitive hernia. Mild right sided testicular discomfort to palpation. Based off exam I did recommend labs and a CT scan. We also discussed that patient would then need transfer to [facility] for testicular US. Spoke to patient's partner as well and partner and patient agreed to plan. I was then alerted that patient had eloped from the emergency department before blood was drawn or CT obtained. Patient had left before I had heard about his wish to leave so no follow up plan was given. CLINICAL IMPRESSION  1. Dysuria    2. Right inguinal pain    3. Right testicular pain        Blood pressure (!) 146/88, pulse 71, temperature 98.4 °F (36.9 °C), temperature source Oral, resp. rate 18, height 6' (1.829 m), weight 189 lb (85.7 kg), SpO2 99 %. DISPOSITION  Norman Phelan eloped from the emergency department before completion of workup. I was unaware that he was leaving so no follow up plan given. Patient was given scripts for the following medications. I counseled patient how to take these medications. Discharge Medication List as of 12/27/2020 12:11 PM          Follow-up with:  No follow-up provider specified. DISCLAIMER: This chart was created using Dragon dictation software. Efforts were made by me to ensure accuracy, however some errors may be present due to limitations of this technology and occasionally words are not transcribed correctly.         Courtney Boyer MD  12/30/20 [id number]

## 2020-12-27 NOTE — ED NOTES
Pt came from room and states he wants to talk to the doctor. States he refuses any blood work or scans and that he wishes to just follow up as an out-patient. Informed pt that doctor was in with another pt but would be in to see him as soon as she could. Pt went back to room. Continuing to monitor.      Karyle Gill, RN  12/27/20 6191

## 2021-11-12 ENCOUNTER — TELEPHONE (OUTPATIENT)
Dept: ORTHOPEDIC SURGERY | Age: 31
End: 2021-11-12

## 2022-01-09 ENCOUNTER — APPOINTMENT (OUTPATIENT)
Dept: GENERAL RADIOLOGY | Age: 32
DRG: 757 | End: 2022-01-09
Payer: MEDICARE

## 2022-01-09 ENCOUNTER — HOSPITAL ENCOUNTER (INPATIENT)
Age: 32
LOS: 10 days | Discharge: HOME | DRG: 757 | End: 2022-01-19
Attending: EMERGENCY MEDICINE | Admitting: PSYCHIATRY & NEUROLOGY
Payer: MEDICARE

## 2022-01-09 DIAGNOSIS — F41.9 ANXIETY: ICD-10-CM

## 2022-01-09 DIAGNOSIS — S92.511A CLOSED DISPLACED FRACTURE OF PROXIMAL PHALANX OF LESSER TOE OF RIGHT FOOT, INITIAL ENCOUNTER: ICD-10-CM

## 2022-01-09 DIAGNOSIS — F39 MOOD DISORDER (HCC): Primary | ICD-10-CM

## 2022-01-09 LAB
A/G RATIO: 1.4 (ref 1.1–2.2)
ACETAMINOPHEN LEVEL: <5 UG/ML (ref 10–30)
ALBUMIN SERPL-MCNC: 4.1 G/DL (ref 3.4–5)
ALP BLD-CCNC: 54 U/L (ref 40–129)
ALT SERPL-CCNC: 34 U/L (ref 10–40)
AMPHETAMINE SCREEN, URINE: ABNORMAL
ANION GAP SERPL CALCULATED.3IONS-SCNC: 8 MMOL/L (ref 3–16)
AST SERPL-CCNC: 38 U/L (ref 15–37)
BARBITURATE SCREEN URINE: ABNORMAL
BASOPHILS ABSOLUTE: 0.1 K/UL (ref 0–0.2)
BASOPHILS RELATIVE PERCENT: 0.8 %
BENZODIAZEPINE SCREEN, URINE: ABNORMAL
BILIRUB SERPL-MCNC: 1.2 MG/DL (ref 0–1)
BILIRUBIN URINE: NEGATIVE
BLOOD, URINE: NEGATIVE
BUN BLDV-MCNC: 10 MG/DL (ref 7–20)
CALCIUM SERPL-MCNC: 9.1 MG/DL (ref 8.3–10.6)
CANNABINOID SCREEN URINE: POSITIVE
CHLORIDE BLD-SCNC: 104 MMOL/L (ref 99–110)
CLARITY: CLEAR
CO2: 28 MMOL/L (ref 21–32)
COCAINE METABOLITE SCREEN URINE: ABNORMAL
COLOR: NORMAL
CREAT SERPL-MCNC: 0.6 MG/DL (ref 0.9–1.3)
EOSINOPHILS ABSOLUTE: 0.1 K/UL (ref 0–0.6)
EOSINOPHILS RELATIVE PERCENT: 1.9 %
ETHANOL: NORMAL MG/DL (ref 0–0.08)
GFR AFRICAN AMERICAN: >60
GFR NON-AFRICAN AMERICAN: >60
GLUCOSE BLD-MCNC: 79 MG/DL (ref 70–99)
GLUCOSE URINE: NEGATIVE MG/DL
HCT VFR BLD CALC: 39.5 % (ref 40.5–52.5)
HEMOGLOBIN: 13.1 G/DL (ref 13.5–17.5)
INFLUENZA A: NOT DETECTED
INFLUENZA B: NOT DETECTED
KETONES, URINE: NEGATIVE MG/DL
LEUKOCYTE ESTERASE, URINE: NEGATIVE
LYMPHOCYTES ABSOLUTE: 2.3 K/UL (ref 1–5.1)
LYMPHOCYTES RELATIVE PERCENT: 37.8 %
Lab: ABNORMAL
MCH RBC QN AUTO: 30.6 PG (ref 26–34)
MCHC RBC AUTO-ENTMCNC: 33.1 G/DL (ref 31–36)
MCV RBC AUTO: 92.4 FL (ref 80–100)
METHADONE SCREEN, URINE: ABNORMAL
MICROSCOPIC EXAMINATION: NORMAL
MONOCYTES ABSOLUTE: 0.7 K/UL (ref 0–1.3)
MONOCYTES RELATIVE PERCENT: 11.6 %
NEUTROPHILS ABSOLUTE: 3 K/UL (ref 1.7–7.7)
NEUTROPHILS RELATIVE PERCENT: 47.9 %
NITRITE, URINE: NEGATIVE
OPIATE SCREEN URINE: ABNORMAL
OXYCODONE URINE: ABNORMAL
PDW BLD-RTO: 12.6 % (ref 12.4–15.4)
PH UA: 6
PH UA: 6 (ref 5–8)
PHENCYCLIDINE SCREEN URINE: ABNORMAL
PLATELET # BLD: 266 K/UL (ref 135–450)
PMV BLD AUTO: 7.6 FL (ref 5–10.5)
POTASSIUM SERPL-SCNC: 4.1 MMOL/L (ref 3.5–5.1)
PROPOXYPHENE SCREEN: ABNORMAL
PROTEIN UA: NEGATIVE MG/DL
RBC # BLD: 4.27 M/UL (ref 4.2–5.9)
SALICYLATE, SERUM: <0.3 MG/DL (ref 15–30)
SARS-COV-2 RNA, RT PCR: DETECTED
SODIUM BLD-SCNC: 140 MMOL/L (ref 136–145)
SPECIFIC GRAVITY UA: 1.01 (ref 1–1.03)
TOTAL PROTEIN: 7.1 G/DL (ref 6.4–8.2)
URINE REFLEX TO CULTURE: NORMAL
URINE TYPE: NORMAL
UROBILINOGEN, URINE: 0.2 E.U./DL
WBC # BLD: 6.2 K/UL (ref 4–11)

## 2022-01-09 PROCEDURE — 85025 COMPLETE CBC W/AUTO DIFF WBC: CPT

## 2022-01-09 PROCEDURE — 6370000000 HC RX 637 (ALT 250 FOR IP): Performed by: EMERGENCY MEDICINE

## 2022-01-09 PROCEDURE — 73590 X-RAY EXAM OF LOWER LEG: CPT

## 2022-01-09 PROCEDURE — 99283 EMERGENCY DEPT VISIT LOW MDM: CPT

## 2022-01-09 PROCEDURE — 81003 URINALYSIS AUTO W/O SCOPE: CPT

## 2022-01-09 PROCEDURE — 82077 ASSAY SPEC XCP UR&BREATH IA: CPT

## 2022-01-09 PROCEDURE — 83036 HEMOGLOBIN GLYCOSYLATED A1C: CPT

## 2022-01-09 PROCEDURE — 80307 DRUG TEST PRSMV CHEM ANLYZR: CPT

## 2022-01-09 PROCEDURE — 84443 ASSAY THYROID STIM HORMONE: CPT

## 2022-01-09 PROCEDURE — 80143 DRUG ASSAY ACETAMINOPHEN: CPT

## 2022-01-09 PROCEDURE — 80061 LIPID PANEL: CPT

## 2022-01-09 PROCEDURE — 1240000000 HC EMOTIONAL WELLNESS R&B

## 2022-01-09 PROCEDURE — 80179 DRUG ASSAY SALICYLATE: CPT

## 2022-01-09 PROCEDURE — 73660 X-RAY EXAM OF TOE(S): CPT

## 2022-01-09 PROCEDURE — 80053 COMPREHEN METABOLIC PANEL: CPT

## 2022-01-09 PROCEDURE — 87636 SARSCOV2 & INF A&B AMP PRB: CPT

## 2022-01-09 RX ORDER — NAPROXEN 500 MG/1
500 TABLET ORAL ONCE
Status: DISCONTINUED | OUTPATIENT
Start: 2022-01-09 | End: 2022-01-10

## 2022-01-09 RX ORDER — HYDROXYZINE PAMOATE 25 MG/1
50 CAPSULE ORAL 3 TIMES DAILY PRN
Status: DISCONTINUED | OUTPATIENT
Start: 2022-01-09 | End: 2022-01-13

## 2022-01-09 RX ORDER — ACETAMINOPHEN 325 MG/1
650 TABLET ORAL EVERY 4 HOURS PRN
Status: DISCONTINUED | OUTPATIENT
Start: 2022-01-09 | End: 2022-01-19 | Stop reason: HOSPADM

## 2022-01-09 RX ORDER — OLANZAPINE 10 MG/1
10 TABLET ORAL EVERY 8 HOURS PRN
Status: DISCONTINUED | OUTPATIENT
Start: 2022-01-09 | End: 2022-01-19 | Stop reason: HOSPADM

## 2022-01-09 RX ORDER — MECOBALAMIN 5000 MCG
10 TABLET,DISINTEGRATING ORAL NIGHTLY
Status: DISCONTINUED | OUTPATIENT
Start: 2022-01-09 | End: 2022-01-19 | Stop reason: HOSPADM

## 2022-01-09 RX ORDER — ARIPIPRAZOLE 10 MG/1
TABLET ORAL
Status: ON HOLD | COMMUNITY
End: 2022-01-18 | Stop reason: HOSPADM

## 2022-01-09 RX ORDER — POLYETHYLENE GLYCOL 3350 17 G
2 POWDER IN PACKET (EA) ORAL
Status: DISCONTINUED | OUTPATIENT
Start: 2022-01-09 | End: 2022-01-13 | Stop reason: SDUPTHER

## 2022-01-09 RX ORDER — LACTOBACILLUS RHAMNOSUS GG 10B CELL
1 CAPSULE ORAL DAILY
Status: DISCONTINUED | OUTPATIENT
Start: 2022-01-10 | End: 2022-01-19 | Stop reason: HOSPADM

## 2022-01-09 RX ORDER — ARIPIPRAZOLE 10 MG/1
10 TABLET ORAL NIGHTLY
Status: DISCONTINUED | OUTPATIENT
Start: 2022-01-10 | End: 2022-01-11

## 2022-01-09 RX ORDER — MELATONIN 10 MG
CAPSULE ORAL
Status: ON HOLD | COMMUNITY
End: 2022-01-18 | Stop reason: HOSPADM

## 2022-01-09 RX ORDER — IBUPROFEN 400 MG/1
400 TABLET ORAL EVERY 6 HOURS PRN
Status: DISCONTINUED | OUTPATIENT
Start: 2022-01-09 | End: 2022-01-10

## 2022-01-09 RX ORDER — ALPRAZOLAM 0.25 MG/1
0.5 TABLET ORAL DAILY PRN
Status: DISCONTINUED | OUTPATIENT
Start: 2022-01-09 | End: 2022-01-13

## 2022-01-09 RX ORDER — OLANZAPINE 10 MG/1
10 INJECTION, POWDER, LYOPHILIZED, FOR SOLUTION INTRAMUSCULAR EVERY 8 HOURS PRN
Status: DISCONTINUED | OUTPATIENT
Start: 2022-01-09 | End: 2022-01-19 | Stop reason: HOSPADM

## 2022-01-09 RX ORDER — ALUMINUM HYDROXIDE, MAGNESIUM HYDROXIDE, DIMETHICONE 400; 400; 40 MG/5ML; MG/5ML; MG/5ML
LIQUID ORAL
Status: ON HOLD | COMMUNITY
End: 2022-01-18 | Stop reason: HOSPADM

## 2022-01-09 RX ORDER — ALPRAZOLAM 0.5 MG/1
TABLET ORAL
Status: ON HOLD | COMMUNITY
End: 2022-01-18 | Stop reason: HOSPADM

## 2022-01-09 RX ORDER — MAGNESIUM HYDROXIDE/ALUMINUM HYDROXICE/SIMETHICONE 120; 1200; 1200 MG/30ML; MG/30ML; MG/30ML
30 SUSPENSION ORAL EVERY 6 HOURS PRN
Status: DISCONTINUED | OUTPATIENT
Start: 2022-01-09 | End: 2022-01-19 | Stop reason: HOSPADM

## 2022-01-09 RX ORDER — NICOTINE 21 MG/24HR
1 PATCH, TRANSDERMAL 24 HOURS TRANSDERMAL DAILY
Status: DISCONTINUED | OUTPATIENT
Start: 2022-01-09 | End: 2022-01-09

## 2022-01-09 RX ORDER — QUETIAPINE FUMARATE 50 MG/1
TABLET, FILM COATED ORAL
Status: ON HOLD | COMMUNITY
End: 2022-01-10

## 2022-01-09 RX ORDER — TRAZODONE HYDROCHLORIDE 50 MG/1
50 TABLET ORAL NIGHTLY PRN
Status: DISCONTINUED | OUTPATIENT
Start: 2022-01-10 | End: 2022-01-19 | Stop reason: HOSPADM

## 2022-01-09 RX ORDER — QUETIAPINE FUMARATE 25 MG/1
50 TABLET, FILM COATED ORAL NIGHTLY
Status: DISCONTINUED | OUTPATIENT
Start: 2022-01-09 | End: 2022-01-09

## 2022-01-09 RX ORDER — POLYETHYLENE GLYCOL 3350 17 G
2 POWDER IN PACKET (EA) ORAL ONCE
Status: COMPLETED | OUTPATIENT
Start: 2022-01-09 | End: 2022-01-09

## 2022-01-09 RX ORDER — DIPHENHYDRAMINE HYDROCHLORIDE 50 MG/ML
50 INJECTION INTRAMUSCULAR; INTRAVENOUS EVERY 4 HOURS PRN
Status: DISCONTINUED | OUTPATIENT
Start: 2022-01-09 | End: 2022-01-13

## 2022-01-09 RX ADMIN — NICOTINE POLACRILEX 2 MG: 2 LOZENGE ORAL at 23:03

## 2022-01-09 ASSESSMENT — PAIN DESCRIPTION - DESCRIPTORS: DESCRIPTORS: ACHING

## 2022-01-09 ASSESSMENT — PAIN SCALES - GENERAL: PAINLEVEL_OUTOF10: 10

## 2022-01-09 ASSESSMENT — PAIN DESCRIPTION - FREQUENCY: FREQUENCY: INTERMITTENT

## 2022-01-09 ASSESSMENT — PAIN DESCRIPTION - ORIENTATION: ORIENTATION: DISTAL

## 2022-01-09 ASSESSMENT — PAIN - FUNCTIONAL ASSESSMENT: PAIN_FUNCTIONAL_ASSESSMENT: PREVENTS OR INTERFERES SOME ACTIVE ACTIVITIES AND ADLS

## 2022-01-09 ASSESSMENT — PAIN DESCRIPTION - LOCATION: LOCATION: TOE (COMMENT WHICH ONE)

## 2022-01-09 ASSESSMENT — PAIN DESCRIPTION - PROGRESSION: CLINICAL_PROGRESSION: NOT CHANGED

## 2022-01-09 ASSESSMENT — PAIN DESCRIPTION - PAIN TYPE: TYPE: ACUTE PAIN

## 2022-01-09 ASSESSMENT — PAIN DESCRIPTION - ONSET: ONSET: ON-GOING

## 2022-01-09 NOTE — ED PROVIDER NOTES
[address]      CHIEF COMPLAINT  Psychiatric Evaluation (pt was seen at [facility] ER for panic attacks; states that he still feels like he is having a nervous breakdown, he wants to be admitted for treatment; states he is having Suicidal Ideations )       Eva Patel is a 28 y.o. male  who presents to the ED complaining of concern for severe PTSD and anxiety. States having SI, feeling like he is having a nervous breakdown. States was given ativan and another med at [facility].  Pain to foot and proximal tibia on right and was scratched by dog on back of right knee two days ago. Pain in foot is in right small toe. States a coyote was attacking the dog and he had to protect them so snapped the coyote's neck. This was all two days ago. Uses medical marijuana. No other illicit drug use. No ETOH abuse. No fevers. No other complaints, modifying factors or associated symptoms. I have reviewed the following from the nursing documentation.     Past Medical History:   Diagnosis Date    ADHD     Adult respiratory distress syndrome ([facility]     ICU     Anxiety     Bipolar disorder     Cognitive disorder     Finger fracture, left 11/22/2017    GERD (gastroesophageal reflux disease)     Hiatal hernia 01/19/2018    Hypertension     Kidney stones     MR (mental retardation), moderate     Nausea and vomiting     Pneumococcal pneumonia (HCC)     Seizures ([facility] [address] [facility]     pt denied any seizures ever     Past Surgical History:   Procedure Laterality Date    MOUTH SURGERY      MOUTH SURGERY       Family History   Problem Relation Age of Onset    Depression Mother     Mental Illness Maternal Uncle     Arthritis Paternal Grandmother      Social History     Socioeconomic History    Marital status:      Spouse name: N/A    Number of children: 0    Years of education: 12    Highest education level: Not on file   Occupational History  Occupation: works on farm     Comment: Farm work   Tobacco Use    Smoking status: Current Some Day Smoker     Packs/day: 0.50     Years: 10.00     Pack years: 5.00     Types: Cigarettes    Smokeless tobacco: Current User     Types: Chew   Vaping Use    Vaping Use: Some days    Substances: Nicotine, THC, Flavoring    Devices: Pre-filled or refillable cartridge   Substance and Sexual Activity    Alcohol use: Not Currently     Alcohol/week: 2.0 standard drinks     Types: 2 Cans of beer per week     Comment: Occasional, \"2x last month\"    Drug use: Yes     Types: Marijuana [place])    Sexual activity: Yes     Partners: Female   Other Topics Concern    Not on file   Social History Narrative    Not on file     Social Determinants of Health     Financial Resource Strain:     Difficulty of Paying Living Expenses: Not on file   Food Insecurity:     Worried About Running Out of Food in the Last Year: Not on file    Jennie of Food in the Last Year: Not on file   Transportation Needs:     Lack of Transportation (Medical): Not on file    Lack of Transportation (Non-Medical):  Not on file   Physical Activity:     Days of Exercise per Week: Not on file    Minutes of Exercise per Session: Not on file   Stress:     Feeling of Stress : Not on file   Social Connections:     Frequency of Communication with Friends and Family: Not on file    Frequency of Social Gatherings with Friends and Family: Not on file    Attends Mandaeism Services: Not on file    Active Member of Clubs or Organizations: Not on file    Attends Club or Organization Meetings: Not on file    Marital Status: Not on file   Intimate Partner Violence:     Fear of Current or Ex-Partner: Not on file    Emotionally Abused: Not on file    Physically Abused: Not on file    Sexually Abused: Not on file   Housing Stability:     Unable to Pay for Housing in the Last Year: Not on file    Number of [place] in the Last Year: Not on file    Unstable Housing in the Last Year: Not on file     Current Facility-Administered Medications   Medication Dose Route Frequency Provider Last Rate Last Admin    naproxen (NAPROSYN) tablet 500 mg  500 mg Oral Once [address] Gonzalez MD        acetaminophen (TYLENOL) tablet 650 mg  650 mg Oral Q4H PRN Suma Michael MD        ibuprofen (ADVIL;MOTRIN) tablet 400 mg  400 mg Oral Q6H PRN Suma Michael MD        magnesium hydroxide (MILK OF MAGNESIA) 400 MG/5ML suspension 30 mL  30 mL Oral Daily PRN Suma Michael MD        nicotine polacrilex (COMMIT) lozenge 2 mg  2 mg Oral Q1H PRN Suma Michael MD        aluminum & magnesium hydroxide-simethicone (MAALOX) 200-200-20 MG/5ML suspension 30 mL  30 mL Oral Q6H PRN Suma Michael MD        hydrOXYzine (VISTARIL) capsule 50 mg  50 mg Oral TID PRN Suma Michael MD        OLANZapine (ZYPREXA) tablet 10 mg  10 mg Oral Q8H PRN Suma Michael MD        Or    OLANZapine [facility]) injection 10 mg  10 mg IntraMUSCular Q8H PRN Suma Michael MD        sterile water injection 2.1 mL  2.1 mL IntraMUSCular Q4H PRN Suma Michael MD        diphenhydrAMINE (BENADRYL) injection 50 mg  50 mg IntraMUSCular Q4H PRN Suma Michael MD        traZODone (DESYREL) tablet 50 mg  50 mg Oral Nightly PRN Suma Michael MD        ALPRAZolam [place]) tablet 0.5 mg  0.5 mg Oral Daily PRN Suma Michael MD        ARIPiprazole (ABILIFY) tablet 10 mg  10 mg Oral Nightly Suma Michael MD        cariprazine hcl (VRAYLAR) capsule 1.5 mg  1.5 mg Oral Daily Suma Michael MD        lactobacillus (CULTURELLE) capsule 1 capsule  1 capsule Oral Daily Suma Michael MD        melatonin disintegrating tablet 10 mg  10 mg Oral Nightly Suma Michael MD         Allergies   Allergen Reactions    Seroquel [Quetiapine] Itching and Other (See Comments)     Restless leg       REVIEW OF SYSTEMS  10 systems reviewed, pertinent positives per HPI otherwise noted to be negative. PHYSICAL EXAM  /88   Pulse 82   Temp 97.7 °F (36.5 °C) (Oral)   Resp 16   Ht 6' (1.829 m)   Wt 190 lb (86.2 kg)   SpO2 94%   BMI 25.77 kg/m²    GENERAL APPEARANCE: Awake and alert. Cooperative. No acute distress. HENT: Normocephalic. Atraumatic. Mucous membranes are moist.  No drooling or stridor. NECK: Supple. EYES: PERRL. EOM's grossly intact. HEART/CHEST: RRR. No murmurs. LUNGS: Respirations unlabored. CTAB. Good air exchange. Speaking comfortably in full sentences. ABDOMEN: No tenderness. Soft. Non-distended. No masses. No organomegaly. No guarding or rebound. MUSCULOSKELETAL: No extremity edema. Compartments soft. No deformity. Mild tenderness to anterior right tibia with superficial abrasion. Linear abrasion to back of right leg/knee without bleeding or surrounding erythema. Tenderness and swelling to right small toe. All extremities neurovascularly intact. SKIN: Warm and dry. No acute rashes. NEUROLOGICAL: Alert and oriented. CN's 2-12 intact. No gross facial drooping. No gross focal deficits. PSYCHIATRIC:  Anxious and depressed mood. LABS  I have reviewed all labs for this visit.    Results for orders placed or performed during the hospital encounter of 01/09/22   COVID-19 & Influenza Combo    Specimen: Nasopharyngeal Swab   Result Value Ref Range    SARS-CoV-2 RNA, RT PCR DETECTED (A) NOT DETECTED    INFLUENZA A NOT DETECTED NOT DETECTED    INFLUENZA B NOT DETECTED NOT DETECTED   Urine Drug Screen   Result Value Ref Range    Amphetamine Screen, Urine Neg Negative <1000ng/mL    Barbiturate Screen, Ur Neg Negative <200 ng/mL    Benzodiazepine Screen, Urine Neg Negative <200 ng/mL    Cannabinoid Scrn, Ur POSITIVE (A) Negative <50 ng/mL    Cocaine Metabolite Screen, Urine Neg Negative <300 ng/mL    Opiate Scrn, Ur Neg Negative <300 ng/mL    PCP Screen, Urine Neg Negative <25 ng/mL    Methadone Screen, Urine Neg Negative <300 ng/mL Propoxyphene Scrn, Ur Neg Negative <300 ng/mL    Oxycodone Urine Neg Negative <100 ng/ml    pH, UA 6.0     Drug Screen Comment: see below    Urinalysis Reflex to Culture    Specimen: Urine, clean catch   Result Value Ref Range    Color, UA Straw Straw/Yellow    Clarity, UA Clear Clear    Glucose, Ur Negative Negative mg/dL    Bilirubin Urine Negative Negative    Ketones, Urine Negative Negative mg/dL    Specific Gravity, UA 1.010 1.005 - 1.030    Blood, Urine Negative Negative    pH, UA 6.0 5.0 - 8.0    Protein, UA Negative Negative mg/dL    Urobilinogen, Urine 0.2 <2.0 E.U./dL    Nitrite, Urine Negative Negative    Leukocyte Esterase, Urine Negative Negative    Microscopic Examination Not Indicated     Urine Type NotGiven     Urine Reflex to Culture Not Indicated    Comprehensive Metabolic Panel   Result Value Ref Range    Sodium 140 136 - 145 mmol/L    Potassium 4.1 3.5 - 5.1 mmol/L    Chloride 104 99 - 110 mmol/L    CO2 28 21 - 32 mmol/L    Anion Gap 8 3 - 16    Glucose 79 70 - 99 mg/dL    BUN 10 7 - 20 mg/dL    CREATININE 0.6 (L) 0.9 - 1.3 mg/dL    GFR Non-African American >60 >60    GFR African American >60 >60    Calcium 9.1 8.3 - 10.6 mg/dL    Total Protein 7.1 6.4 - 8.2 g/dL    Albumin 4.1 3.4 - 5.0 g/dL    Albumin/Globulin Ratio 1.4 1.1 - 2.2    Total Bilirubin 1.2 (H) 0.0 - 1.0 mg/dL    Alkaline Phosphatase 54 40 - 129 U/L    ALT 34 10 - 40 U/L    AST 38 (H) 15 - 37 U/L   CBC Auto Differential   Result Value Ref Range    WBC 6.2 4.0 - 11.0 K/uL    RBC 4.27 4.20 - 5.90 M/uL    Hemoglobin 13.1 (L) 13.5 - 17.5 g/dL    Hematocrit 39.5 (L) 40.5 - 52.5 %    MCV 92.4 80.0 - 100.0 fL    MCH 30.6 26.0 - 34.0 pg    MCHC 33.1 31.0 - 36.0 g/dL    RDW 12.6 12.4 - 15.4 %    Platelets 018 167 - 860 K/uL    MPV 7.6 5.0 - 10.5 fL    Neutrophils % 47.9 %    Lymphocytes % 37.8 %    Monocytes % 11.6 %    Eosinophils % 1.9 %    Basophils % 0.8 %    Neutrophils Absolute 3.0 1.7 - 7.7 K/uL    Lymphocytes Absolute 2.3 1.0 - 5.1 K/uL    Monocytes Absolute 0.7 0.0 - 1.3 K/uL    Eosinophils Absolute 0.1 0.0 - 0.6 K/uL    Basophils Absolute 0.1 0.0 - 0.2 K/uL   Acetaminophen level   Result Value Ref Range    Acetaminophen Level <5 (L) 10 - 30 ug/mL   Salicylate   Result Value Ref Range    Salicylate, Serum <9.1 (L) 15.0 - 30.0 mg/dL   Ethanol   Result Value Ref Range    Ethanol Lvl None Detected mg/dL       RADIOLOGY  XR TIBIA FIBULA RIGHT (2 VIEWS)    Result Date: 1/9/2022  EXAMINATION: 4 XRAY VIEWS OF THE RIGHT TIBIA AND FIBULA 1/9/2022 6:57 pm COMPARISON: None. 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 The joint spaces are intact. No fracture or dislocation is seen. No abnormality seen. XR TOE RIGHT (MIN 2 VIEWS)    Result Date: 1/9/2022  EXAMINATION: 3 XRAY VIEWS OF THE RIGHT TOE 1/9/2022 6:57 pm COMPARISON: None. HISTORY: ORDERING SYSTEM PROVIDED HISTORY: pain to right 5th toe TECHNOLOGIST PROVIDED HISTORY: Reason for exam:->pain to right 5th toe Reason for Exam: pain to rt 5th digit from bull riding per pt FINDINGS: There is a mildly displaced and slightly comminuted transverse fracture along the mid shaft of the proximal phalanx of the 5th toe. The joint spaces are intact     Mildly displaced and slightly comminuted transverse fracture along the proximal phalanx of the 5th toe. ED COURSE/MDM  Patient seen and evaluated. Old records reviewed. Labs and imaging reviewed and results discussed with patient. Patient presenting for evaluation due to concerns for possible suicidal ideation and anxiety. He does have a fractured fifth toe which will be treated supportively with chris tape and postop shoe. He is also COVID-positive. No hypoxia.     I have performed a medical clearance examination on this patient. It is my opinion that no medical conditions were discovered that would preclude admission to a behavioral health unit or discharge home. I feel that the patient is medically stable for disposition by the behavioral health team at this time. At this time behavioral team is evaluated the patient and feel that patient would benefit from inpatient admission for further evaluation management. Patient to be admitted to [facility] at this time.     During the patient's ED course, the patient was given:  Medications   naproxen (NAPROSYN) tablet 500 mg (has no administration in time range)   acetaminophen (TYLENOL) tablet 650 mg (has no administration in time range)   ibuprofen (ADVIL;MOTRIN) tablet 400 mg (has no administration in time range)   magnesium hydroxide (MILK OF MAGNESIA) 400 MG/5ML suspension 30 mL (has no administration in time range)   nicotine polacrilex (COMMIT) lozenge 2 mg (has no administration in time range)   aluminum & magnesium hydroxide-simethicone (MAALOX) 200-200-20 MG/5ML suspension 30 mL (has no administration in time range)   hydrOXYzine (VISTARIL) capsule 50 mg (has no administration in time range)   OLANZapine (ZYPREXA) tablet 10 mg (has no administration in time range)     Or   OLANZapine (ZYPREXA) injection 10 mg (has no administration in time range)   sterile water injection 2.1 mL (has no administration in time range)   diphenhydrAMINE (BENADRYL) injection 50 mg (has no administration in time range)   traZODone (DESYREL) tablet 50 mg (has no administration in time range)   ALPRAZolam (XANAX) tablet 0.5 mg (has no administration in time range)   ARIPiprazole (ABILIFY) tablet 10 mg (has no administration in time range)   cariprazine hcl (VRAYLAR) capsule 1.5 mg (has no administration in time range)   lactobacillus (CULTURELLE) capsule 1 capsule (has no administration in time range)   melatonin disintegrating tablet 10 mg (has no administration in time range)   nicotine polacrilex (COMMIT) lozenge 2 mg (2 mg Oral Given 1/9/22 8250)        CLINICAL IMPRESSION  1. Mood disorder ([address]    2. Anxiety    3. Closed displaced fracture of proximal phalanx of lesser toe of right foot, initial encounter        Blood pressure 123/88, pulse 82, temperature 97.7 °F (36.5 °C), temperature source Oral, resp. rate 16, height 6' (1.829 m), weight 190 lb (86.2 kg), SpO2 94 %. Demi Mcgee was admitted in stable condition. DISCLAIMER: This chart was created using Dragon dictation software. Efforts were made by me to ensure accuracy, however some errors may be present due to limitations of this technology and occasionally words are not transcribed correctly.         Montserrat Austin MD  01/10/22 [id number]

## 2022-01-10 LAB
CHOLESTEROL, TOTAL: 170 MG/DL (ref 0–199)
HDLC SERPL-MCNC: 63 MG/DL (ref 40–60)
LDL CHOLESTEROL CALCULATED: 93 MG/DL
TRIGL SERPL-MCNC: 72 MG/DL (ref 0–150)
TSH SERPL DL<=0.05 MIU/L-ACNC: 1.01 UIU/ML (ref 0.27–4.2)
VLDLC SERPL CALC-MCNC: 14 MG/DL

## 2022-01-10 PROCEDURE — 1240000000 HC EMOTIONAL WELLNESS R&B

## 2022-01-10 PROCEDURE — 99221 1ST HOSP IP/OBS SF/LOW 40: CPT

## 2022-01-10 PROCEDURE — 6370000000 HC RX 637 (ALT 250 FOR IP): Performed by: PSYCHIATRY & NEUROLOGY

## 2022-01-10 PROCEDURE — 99223 1ST HOSP IP/OBS HIGH 75: CPT | Performed by: PSYCHIATRY & NEUROLOGY

## 2022-01-10 RX ORDER — KETOROLAC TROMETHAMINE 30 MG/ML
30 INJECTION, SOLUTION INTRAMUSCULAR; INTRAVENOUS EVERY 6 HOURS PRN
Status: DISCONTINUED | OUTPATIENT
Start: 2022-01-10 | End: 2022-01-13

## 2022-01-10 RX ADMIN — ACETAMINOPHEN 650 MG: 325 TABLET ORAL at 05:44

## 2022-01-10 RX ADMIN — ALPRAZOLAM 0.5 MG: 0.25 TABLET ORAL at 07:41

## 2022-01-10 RX ADMIN — Medication 10 MG: at 20:50

## 2022-01-10 RX ADMIN — Medication 1 CAPSULE: at 07:41

## 2022-01-10 RX ADMIN — OLANZAPINE 10 MG: 10 TABLET, FILM COATED ORAL at 22:39

## 2022-01-10 RX ADMIN — NICOTINE POLACRILEX 2 MG: 2 LOZENGE ORAL at 12:28

## 2022-01-10 RX ADMIN — NICOTINE POLACRILEX 2 MG: 2 LOZENGE ORAL at 07:46

## 2022-01-10 RX ADMIN — ARIPIPRAZOLE 10 MG: 10 TABLET ORAL at 00:17

## 2022-01-10 RX ADMIN — NICOTINE POLACRILEX 2 MG: 2 LOZENGE ORAL at 05:30

## 2022-01-10 RX ADMIN — Medication 10 MG: at 00:17

## 2022-01-10 RX ADMIN — ARIPIPRAZOLE 10 MG: 10 TABLET ORAL at 20:24

## 2022-01-10 RX ADMIN — NICOTINE POLACRILEX 2 MG: 2 LOZENGE ORAL at 00:16

## 2022-01-10 RX ADMIN — ACETAMINOPHEN 650 MG: 325 TABLET ORAL at 15:33

## 2022-01-10 RX ADMIN — HYDROXYZINE PAMOATE 50 MG: 25 CAPSULE ORAL at 18:13

## 2022-01-10 RX ADMIN — CARIPRAZINE 1.5 MG: 1.5 CAPSULE, GELATIN COATED ORAL at 07:59

## 2022-01-10 RX ADMIN — ACETAMINOPHEN 650 MG: 325 TABLET ORAL at 21:54

## 2022-01-10 RX ADMIN — IBUPROFEN 400 MG: 400 TABLET, FILM COATED ORAL at 00:16

## 2022-01-10 RX ADMIN — NICOTINE POLACRILEX 2 MG: 2 LOZENGE ORAL at 19:45

## 2022-01-10 RX ADMIN — NICOTINE POLACRILEX 2 MG: 2 LOZENGE ORAL at 21:54

## 2022-01-10 RX ADMIN — NICOTINE POLACRILEX 2 MG: 2 LOZENGE ORAL at 18:13

## 2022-01-10 RX ADMIN — OLANZAPINE 10 MG: 10 TABLET, FILM COATED ORAL at 07:41

## 2022-01-10 RX ADMIN — NICOTINE POLACRILEX 2 MG: 2 LOZENGE ORAL at 15:33

## 2022-01-10 ASSESSMENT — LIFESTYLE VARIABLES
HISTORY_ALCOHOL_USE: NO
HISTORY_ALCOHOL_USE: NO

## 2022-01-10 ASSESSMENT — SLEEP AND FATIGUE QUESTIONNAIRES
DIFFICULTY ARISING: NO
DO YOU HAVE DIFFICULTY SLEEPING: YES
DIFFICULTY STAYING ASLEEP: YES
AVERAGE NUMBER OF SLEEP HOURS: 6
AVERAGE NUMBER OF SLEEP HOURS: 6
DO YOU USE A SLEEP AID: YES
DO YOU USE A SLEEP AID: YES
DIFFICULTY FALLING ASLEEP: YES
RESTFUL SLEEP: NO
DIFFICULTY ARISING: NO
RESTFUL SLEEP: NO
DO YOU HAVE DIFFICULTY SLEEPING: YES
SLEEP PATTERN: DIFFICULTY FALLING ASLEEP
SLEEP PATTERN: DIFFICULTY FALLING ASLEEP
DIFFICULTY FALLING ASLEEP: YES
DIFFICULTY STAYING ASLEEP: YES

## 2022-01-10 ASSESSMENT — PAIN SCALES - GENERAL
PAINLEVEL_OUTOF10: 10
PAINLEVEL_OUTOF10: 6
PAINLEVEL_OUTOF10: 10
PAINLEVEL_OUTOF10: 3
PAINLEVEL_OUTOF10: 10
PAINLEVEL_OUTOF10: 0

## 2022-01-10 ASSESSMENT — PAIN DESCRIPTION - PAIN TYPE: TYPE: ACUTE PAIN

## 2022-01-10 NOTE — BH NOTE
Purposeful Rounding    Patient concerns reported:NONE NOTED.  PT IN BED SLEEPING    Nurse made aware:NO    Patient Turned and repositioned: Independent    Patient Toileted: Independent    Fall Precautions in Place: Yellow non-skid socks on, Bed locked in low position, 1/2 bed rails up, Lighting appropriate, Room free of clutter and Clear path to bathroom      Electronically signed by Viktoriya Akhtar on 1/10/22 at 2:33 AM EST

## 2022-01-10 NOTE — PROGRESS NOTES
Behavioral Services  Medicare Certification Upon Admission    I certify that this patient's inpatient psychiatric hospital admission is medically necessary for:    [x] (1) Treatment which could reasonably be expected to improve this patient's condition,       [x] (2) Or for diagnostic study;     AND     [x](2) The inpatient psychiatric services are provided while the individual is under the care of a physician and are included in the individualized plan of care.     Estimated length of stay/service 3-5 d    Plan for post-hospital care outpt    Electronically signed by Robyn Liriano MD on 1/10/2022 at 1:27 PM
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
## 2022-01-10 NOTE — BH NOTE
Patient educated on side rails. Patient pulls side rails up stating \"I need them up to feel safe. \"

## 2022-01-10 NOTE — BH NOTE
Purposeful Rounding    Patient concerns reported:NONE NOTED.  PT IN BED RESTING    Nurse made aware:NO    Patient Turned and repositioned: Independent    Patient Toileted: Independent    Fall Precautions in Place: Yellow non-skid socks on, Bed locked in low position, 1/2 bed rails up, Lighting appropriate, Room free of clutter and Clear path to bathroom      Electronically signed by Natalia Tejada on 1/10/22 at 12:01 AM EST

## 2022-01-10 NOTE — ED NOTES
Writer went to assess patient, patient unable to answer questions due to falling asleep. Patient did state he came to the hospital because he wants to kill himself and is having a mental breakdown. Spoke with ER doctor, patient was seen earlier today at [facility] ER for same thing.       Paula Orr RN  01/09/22 0817

## 2022-01-10 NOTE — BH NOTE
Patient arrived on unit with [facility] AN AFFILIATE OF [facility] staff via wheelchair. Patient given fruit and fluids. Oriented to room and unit.  Vital signs obtained
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
## 2022-01-10 NOTE — BH NOTE
Patient ask about blood work. Writer informed patient that lab would be drawing labs this morning.  Patient stated,\"NO MORE BLOOD!!!\"

## 2022-01-10 NOTE — BH NOTE
4 Eyes Skin Assessment     The patient is being assessed for  Admission    I agree that 2 RN's have performed a thorough Head to Toe Skin Assessment on the patient. ALL assessment sites listed below have been assessed. Areas assessed for pressure by both nurses:   [x]   Head, Face, and Ears   [x]   Shoulders, Back, and Chest  [x]   Arms, Elbows, and Hands   [x]   Coccyx, Sacrum, and Ischum  [x]   Legs, Feet, and Heels                                Skin Assessed Under all Medical Devices by both nurses:  Shoe for broken toe             All Mepilex Borders were peeled back and area peeked at by both nurses:  No: none in place  Please list where Mepilex Borders are located:  NA                 Does the Patient have Skin Breakdown related to pressure?   No              Dominick Prevention initiated:  NO  Wound Care Orders initiated:  NO      [facility] nurse consulted for Pressure Injury (Stage 3,4, Unstageable, DTI, NWPT, Complex wounds)and New or Established Ostomies:  NO      Nurse 1 eSignature: Electronically signed by Lunda Angelucci, RN on 1/10/22 at 1:26 AM EST    **SHARE this note so that the co-signing nurse is able to place an eSignature**    Nurse 2 eSignature: Electronically signed by Rubia Mcneil RN on 1/10/22 at 1:27 AM EST

## 2022-01-10 NOTE — BH NOTE
Senior Purposeful Rounding     Position:Repositions Self     Physical Environment:Room free from clutter, Clear path to bathroom , Adequate lighting and No safety hazards noted     Pain Rating/ Nonverbal Pain Behaviors:0; none     Pain interventions Attempted: NA     Patient Toileted:Independent
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
## 2022-01-10 NOTE — FLOWSHEET NOTE
Senior Purposeful Rounding    Position:Repositions Self    Physical Environment:Room free from clutter, Clear path to bathroom  and No safety hazards noted    Pain Rating/ Nonverbal Pain Behaviors:denies; none    Pain interventions Attempted:NA    Patient Toileted:Independent

## 2022-01-10 NOTE — FLOWSHEET NOTE
Senior Purposeful Rounding    Position:Repositions Self    Physical Environment:Room free from clutter, Clear path to bathroom , Adequate lighting and No safety hazards noted    Pain Rating/ Nonverbal Pain Behaviors:10; none    Pain interventions Attempted: Prn Tylenol given @0544    Patient Toileted:Continent and Independent

## 2022-01-10 NOTE — BH NOTE
[facility]  Admission Note     Admission Type:   Admission Type:  Involuntary    Reason for admission:  Reason for Admission: Suicidal Ideation    PATIENT STRENGTHS:  Strengths: Positive Support,Employment    Patient Strengths and Limitations:  Limitations: Limited education -> difficulty reading or writing,Difficulty problem solving/relies on others to help solve problems    Addictive Behavior:   Addictive Behavior  In the past 3 months, have you felt or has someone told you that you have a problem with:  : None  Do you have a history of Chemical Use?: No  Do you have a history of Alcohol Use?: No  Do you have a history of Street Drug Abuse?: No  Histroy of Prescripton Drug Abuse?: No    Medical Problems:   Past Medical History:   Diagnosis Date    ADHD     Adult respiratory distress syndrome ([address]     ICU     Anxiety     Bipolar disorder     Cognitive disorder     Finger fracture, left 11/22/2017    GERD (gastroesophageal reflux disease)     Hiatal hernia 01/19/2018    Hypertension     Kidney stones     MR (mental retardation), moderate     Nausea and vomiting     Pneumococcal pneumonia ([address]     Seizures ([facility]     pt denied any seizures ever       Status EXAM:  Status and Exam  Normal: No  Facial Expression: Exaggerated  Affect: Blunt  Level of Consciousness: Alert  Mood:Normal: No  Mood: Anxious,Depressed  Motor Activity:Normal: Yes  Interview Behavior: Impulsive,Evasive,Uncooperative/Withdrawn  Preception: [place] to Person,[place] to Time,[place] to Place,[place] to Situation  Attention:Normal: No  Attention: Distractible,Unable to Concentrate  Thought Processes: Blocking,Circumstantial  Thought Content:Normal: No  Thought Content: Preoccupations  Hallucinations: None  Delusions: No  Memory:Normal: No  Memory: Confabulation  Insight and Judgment: No  Insight and Judgment: Poor Insight,Poor Judgment  Present Suicidal Ideation: Yes  Present Homicidal Ideation: No    Tobacco Screening:  Practical Counseling, on admission, benedicto X, if applicable and completed (first 3 are required if patient doesn't refuse):            ( )  Recognizing danger situations (included triggers and roadblocks)                    ( )  Coping skills (new ways to manage stress, exercise, relaxation techniques, changing routine, distraction)                                                           ( )  Basic information about quitting (benefits of quitting, techniques in how to quit, available resources  ( ) Referral for counseling faxed to [facility]                                           (X) Patient refused counseling  ( ) Patient has not smoked in the last 30 days    Metabolic Screening:    Lab Results   Component Value Date    LABA1C 4.9 10/18/2017       No results found for: CHOL  No results found for: TRIG  No results found for: HDL  No components found for: LDLCAL  No results found for: LABVLDL      Body mass index is 25.77 kg/m². BP Readings from Last 2 Encounters:   01/09/22 123/88   12/27/20 (!) 146/88           Pt admitted with followings belongings:  Dental Appliances: Uppers  Vision - Corrective Lenses: None  Hearing Aid: None  Jewelry: None  Body Piercings Removed: N/A  Clothing: Nevaeh General  Were All Patient Medications Collected?: Not Applicable  Other Valuables: Andrew Barnes (Comment) (direct express mastercard, GO2 Visa card,  license)     Patient's home medications were unable to be verified. Patient oriented to surroundings and program expectations and copy of patient rights given.  Received admission packet: Patient refused signing paperwork       Leonel Goldberg RN

## 2022-01-10 NOTE — ED NOTES
Level of Care Disposition: Admit      Patient was seen by ED provider and [facility] AN AFFILIATE OF [facility] staff. The case presented to psychiatric provider on-call Dr. Subhash Larsen. Based on the ED evaluation and information presented to the provider by Gary Dunaway RN it is the recommendation of the on call psychiatric provider that inpatient hospitalization is the least restrictive environment for the patient at this time. The patient will be admitted to the inpatient unit. Admitting provider did not order suicide precautions based on patient is currently yany for safety.                    Josue Edmonds RN  01/09/22 3109

## 2022-01-10 NOTE — H&P
Hospital Medicine History & Physical      PCP: No primary care provider on file. Date of Admission: 1/9/2022    Date of Service: Pt seen/examined on 1/10/2022      Chief Complaint:    Chief Complaint   Patient presents with    Psychiatric Evaluation     pt was seen at [facility] ER for panic attacks; states that he still feels like he is having a nervous breakdown, he wants to be admitted for treatment; states he is having Suicidal Ideations          History Of Present Illness: The patient is a 28 y.o. male who presented to [facility] for autistic spectrum disorder. Patient was seen and evaluated in the ED by the ED medical provider, patient was medically cleared for admission to [facility]. This note serves as an admission medical H&P. Tobacco use: 1 pack every 4 to 5 days, vaping every other day  ETOH use: Occasional  Illicit drug use: Medical THC    Patient denies any medical complaints    Past Medical History:        Diagnosis Date    ADHD     Adult respiratory distress syndrome ([facility]     ICU     Anxiety     Bipolar disorder     Cognitive disorder     Finger fracture, left 11/22/2017    GERD (gastroesophageal reflux disease)     Hiatal hernia 01/19/2018    Hypertension     Kidney stones     MR (mental retardation), moderate     Nausea and vomiting     Pneumococcal pneumonia (HCC)     Seizures ([facility]     pt denied any seizures ever       Past Surgical History:        Procedure Laterality Date    MOUTH SURGERY      MOUTH SURGERY         Medications Prior to Admission:    Prior to Admission medications    Medication Sig Start Date End Date Taking? Authorizing Provider   ALPRAZolam (XANAX) 0.5 MG tablet alprazolam 0.5 mg tablet   TAKE 1 TABLET BY MOUTH ONCE DAILY AS NEEDED FOR 10 DAYS. Yes Historical Provider, MD   cariprazine hcl (VRAYLAR) 1.5 MG capsule Vraylar 1.5 mg capsule   Take 1 capsule every day by oral route for 30 days.    Yes Historical Provider, MD   Lactobacillus Rhamnosus, GG, ( PROBIOTIC DIGESTIVE CARE) CAPS Culturelle 10 billion cell capsule   Take 1 capsule twice a day by oral route. Yes Historical Provider, MD   medical marijuana marijuana   Yes Historical Provider, MD   ARIPiprazole (ABILIFY) 10 MG tablet aripiprazole 10 mg tablet   TAKE 1 TABLET EVERY DAY AT BEDTIME. Historical Provider, MD   melatonin 10 MG CAPS capsule melatonin 10 mg capsule   Take 1 capsule by oral route at bedtime. Historical Provider, MD   nicotine polacrilex (NICORETTE) 2 MG gum nicotine (polacrilex) 2 mg gum    Historical Provider, MD       Allergies:  Seroquel [quetiapine]    Social History:  The patient currently lives at home with parents    TOBACCO:   reports that he has been smoking cigarettes. He has a 5.00 pack-year smoking history. His smokeless tobacco use includes chew. ETOH:   reports previous alcohol use of about 2.0 standard drinks of alcohol per week. Family History:   Positive as follows:        Problem Relation Age of Onset    Depression Mother     Mental Illness Maternal Uncle     Arthritis Paternal Grandmother        REVIEW OF SYSTEMS:       Constitutional: Negative for fever   HENT: Negative for sore throat   Eyes: Negative for redness   Respiratory: Negative  for dyspnea, cough   Cardiovascular: Negative for chest pain   Gastrointestinal: Negative for vomiting, diarrhea   Genitourinary: Negative for hematuria   Musculoskeletal: Negative for arthralgias   Skin: Negative for rash   Neurological: Negative for syncope    Hematological: Negative for easy bruising/bleeding   Psychiatric/Behavorial: Per psychiatry team evaluation     PHYSICAL EXAM:    BP (!) 112/59   Pulse 78   Temp 98.2 °F (36.8 °C) (Oral)   Resp 16   Ht 6' (1.829 m)   Wt 190 lb (86.2 kg)   SpO2 98%   BMI 25.77 kg/m²     Gen: No distress. Alert. Eyes: PERRL. No sclera icterus. No conjunctival injection. ENT: No discharge. Pharynx clear. Neck: Trachea midline.   Resp: No accessory muscle use. No crackles. No wheezes. No rhonchi. CV: Regular rate. Regular rhythm. No murmur. No rub. No edema. GI: Non-tender. Non-distended. Normal bowel sounds. Skin: Warm and dry. No nodule on exposed extremities. No rash on exposed extremities. M/S: No cyanosis. No joint deformity. No clubbing. Postop shoe on the right foot. Neuro: Awake. No focal neurologic deficit on exam.  Cranial nerves II through XII intact. Patient is able to ambulate without difficulty. Psych: Per psychiatry team evaluation     CBC:   Recent Labs     01/09/22 1910   WBC 6.2   HGB 13.1*   HCT 39.5*   MCV 92.4        BMP:   Recent Labs     01/09/22 1910      K 4.1      CO2 28   BUN 10   CREATININE 0.6*     LIVER PROFILE:   Recent Labs     01/09/22 1910   AST 38*   ALT 34   BILITOT 1.2*   ALKPHOS 54     UA:  Recent Labs     01/09/22  1827   COLORU Straw   PHUR 6.0  6.0   CLARITYU Clear   SPECGRAV 1.010   LEUKOCYTESUR Negative   UROBILINOGEN 0.2   BILIRUBINUR Negative   BLOODU Negative   GLUCOSEU Negative          U/A:    Lab Results   Component Value Date    NITRITE Neg 06/18/2014    COLORU Straw 01/09/2022    WBCUA 3-5 05/09/2016    RBCUA 0-2 05/09/2016    MUCUS 1+ 05/09/2016    BACTERIA 1+ 05/09/2016    CLARITYU Clear 01/09/2022    SPECGRAV 1.010 01/09/2022    LEUKOCYTESUR Negative 01/09/2022    BLOODU Negative 01/09/2022    GLUCOSEU Negative 01/09/2022    GLUCOSEU NEGATIVE 02/11/2010       CULTURES  Results for Wenceslao Ortega (MRN [id number]) as of 1/10/2022 11:39   Ref. Range 1/9/2022 19:10   INFLUENZA A Latest Ref Range: NOT DETECTED  NOT DETECTED   INFLUENZA B Latest Ref Range: NOT DETECTED  NOT DETECTED   SARS-CoV-2 RNA, RT PCR Latest Ref Range: NOT DETECTED  DETECTED (A)       RADIOLOGY  XR TOE RIGHT (MIN 2 VIEWS)   Final Result   Mildly displaced and slightly comminuted transverse fracture along the   proximal phalanx of the 5th toe.          XR TIBIA FIBULA RIGHT (2 VIEWS)   Final Result   No abnormality seen. ASSESSMENT/PLAN:  #Autistic spectrum disorder  - per psychiatry team    #COVID-19  -+1/9  -Afebrile, no leukocytosis, not hypoxic  -No COVID-specific treatment  -Monitor    #Fracture of the proximal phalanx of the right fifth toe  -XR as above  -Buddy tape and postop shoe in place  -Patient states he received Norco outpatient for pain.   Current pain meds not sufficient.  -Ketorolac every 6h as needed for no more than 5 days    #Tobacco dependence  -Recommend cessation    #Marijuana use  -Patient states medical card  -PDMP confirms    Aliyah Webber PA-C  1/10/2022 11:39 AM

## 2022-01-11 LAB
ESTIMATED AVERAGE GLUCOSE: 88.2 MG/DL
HBA1C MFR BLD: 4.7 %

## 2022-01-11 PROCEDURE — 6370000000 HC RX 637 (ALT 250 FOR IP): Performed by: PSYCHIATRY & NEUROLOGY

## 2022-01-11 PROCEDURE — 99233 SBSQ HOSP IP/OBS HIGH 50: CPT | Performed by: PSYCHIATRY & NEUROLOGY

## 2022-01-11 PROCEDURE — 1240000000 HC EMOTIONAL WELLNESS R&B

## 2022-01-11 RX ORDER — ARIPIPRAZOLE 15 MG/1
15 TABLET ORAL NIGHTLY
Status: DISCONTINUED | OUTPATIENT
Start: 2022-01-12 | End: 2022-01-12

## 2022-01-11 RX ORDER — CARBOXYMETHYLCELLULOSE SODIUM 10 MG/ML
1 GEL OPHTHALMIC EVERY 4 HOURS PRN
Status: DISCONTINUED | OUTPATIENT
Start: 2022-01-11 | End: 2022-01-19 | Stop reason: HOSPADM

## 2022-01-11 RX ORDER — NICOTINE 21 MG/24HR
1 PATCH, TRANSDERMAL 24 HOURS TRANSDERMAL DAILY
Status: DISCONTINUED | OUTPATIENT
Start: 2022-01-11 | End: 2022-01-19 | Stop reason: HOSPADM

## 2022-01-11 RX ADMIN — OLANZAPINE 10 MG: 10 TABLET, FILM COATED ORAL at 06:51

## 2022-01-11 RX ADMIN — Medication 1 CAPSULE: at 08:15

## 2022-01-11 RX ADMIN — ARIPIPRAZOLE 10 MG: 10 TABLET ORAL at 19:56

## 2022-01-11 RX ADMIN — NICOTINE POLACRILEX 2 MG: 2 LOZENGE ORAL at 08:15

## 2022-01-11 RX ADMIN — NICOTINE POLACRILEX 2 MG: 2 LOZENGE ORAL at 02:52

## 2022-01-11 RX ADMIN — NICOTINE POLACRILEX 2 MG: 2 LOZENGE ORAL at 18:23

## 2022-01-11 RX ADMIN — NICOTINE POLACRILEX 2 MG: 2 LOZENGE ORAL at 23:28

## 2022-01-11 RX ADMIN — OLANZAPINE 10 MG: 10 TABLET, FILM COATED ORAL at 18:23

## 2022-01-11 RX ADMIN — Medication 10 MG: at 19:56

## 2022-01-11 RX ADMIN — NICOTINE POLACRILEX 2 MG: 2 LOZENGE ORAL at 12:37

## 2022-01-11 RX ADMIN — MAGNESIUM HYDROXIDE/ALUMINUM HYDROXICE/SIMETHICONE 30 ML: 120; 1200; 1200 SUSPENSION ORAL at 23:37

## 2022-01-11 RX ADMIN — ACETAMINOPHEN 650 MG: 325 TABLET ORAL at 18:23

## 2022-01-11 RX ADMIN — HYDROXYZINE PAMOATE 50 MG: 25 CAPSULE ORAL at 06:00

## 2022-01-11 RX ADMIN — NICOTINE POLACRILEX 2 MG: 2 LOZENGE ORAL at 19:56

## 2022-01-11 RX ADMIN — NICOTINE POLACRILEX 2 MG: 2 LOZENGE ORAL at 06:00

## 2022-01-11 RX ADMIN — CARIPRAZINE 1.5 MG: 1.5 CAPSULE, GELATIN COATED ORAL at 08:15

## 2022-01-11 RX ADMIN — NICOTINE POLACRILEX 2 MG: 2 LOZENGE ORAL at 01:01

## 2022-01-11 RX ADMIN — HYDROXYZINE PAMOATE 50 MG: 25 CAPSULE ORAL at 14:00

## 2022-01-11 RX ADMIN — NICOTINE POLACRILEX 2 MG: 2 LOZENGE ORAL at 13:49

## 2022-01-11 RX ADMIN — CARBOXYMETHYLCELLULOSE SODIUM 1 DROP: 10 GEL OPHTHALMIC at 18:27

## 2022-01-11 RX ADMIN — ACETAMINOPHEN 650 MG: 325 TABLET ORAL at 08:15

## 2022-01-11 RX ADMIN — NICOTINE POLACRILEX 2 MG: 2 LOZENGE ORAL at 10:45

## 2022-01-11 RX ADMIN — ACETAMINOPHEN 650 MG: 325 TABLET ORAL at 02:50

## 2022-01-11 RX ADMIN — NICOTINE POLACRILEX 2 MG: 2 LOZENGE ORAL at 16:52

## 2022-01-11 ASSESSMENT — PAIN SCALES - GENERAL
PAINLEVEL_OUTOF10: 3
PAINLEVEL_OUTOF10: 2
PAINLEVEL_OUTOF10: 7
PAINLEVEL_OUTOF10: 10

## 2022-01-11 ASSESSMENT — PAIN DESCRIPTION - LOCATION: LOCATION: TOE (COMMENT WHICH ONE)

## 2022-01-11 ASSESSMENT — PAIN DESCRIPTION - ONSET: ONSET: ON-GOING

## 2022-01-11 ASSESSMENT — PAIN DESCRIPTION - PROGRESSION: CLINICAL_PROGRESSION: GRADUALLY IMPROVING

## 2022-01-11 ASSESSMENT — PAIN DESCRIPTION - PAIN TYPE: TYPE: ACUTE PAIN

## 2022-01-11 ASSESSMENT — PAIN DESCRIPTION - FREQUENCY: FREQUENCY: INTERMITTENT

## 2022-01-11 ASSESSMENT — PAIN DESCRIPTION - DESCRIPTORS: DESCRIPTORS: ACHING;DISCOMFORT

## 2022-01-11 ASSESSMENT — PAIN - FUNCTIONAL ASSESSMENT: PAIN_FUNCTIONAL_ASSESSMENT: ACTIVITIES ARE NOT PREVENTED

## 2022-01-11 NOTE — BH NOTE
Senior Purposeful Rounding     Position:Ambulating in elaine      Physical Environment:Room free from clutter, Clear path to bathroom , Adequate lighting and No safety hazards noted     Pain Rating/ Nonverbal Pain Behaviors:0; No complaints or s/s     Pain interventions Attempted: n/a     Patient Toileted:Independent

## 2022-01-11 NOTE — BH NOTE
Purposeful Rounding    Patient concerns reported:NONE NOTED.  PT IN BED SLEEPING    Nurse made aware:NO    Patient Turned and repositioned: Independent    Patient Toileted: Independent    Fall Precautions in Place: Yellow non-skid socks on, Bed locked in low position, 1/2 bed rails up, Lighting appropriate, Room free of clutter and Clear path to bathroom      Electronically signed by Rachel Griffith on 1/10/22 at 11:06 PM EST

## 2022-01-11 NOTE — BH NOTE
Left voicemail message for pt's Frankie ([facility] [phone] x [phone]) requesting a return call to obtain collateral.     Pt has a new pt appointment with the psychiatrist on 1/18/22 @ 9:30.

## 2022-01-11 NOTE — BH NOTE
Senior Purposeful Rounding     Position: Ambulating in elaine   Physical Environment:Room free from clutter, Clear path to bathroom , Adequate lighting and No safety hazards noted     Pain Rating/ Nonverbal Pain Behaviors:0; no complaints or s/s     Pain interventions Attempted: n/a     Patient Toileted:Independent

## 2022-01-11 NOTE — BH NOTE
Purposeful Rounding    Patient concerns reported:NONE NOTED.  PT TAKING SHOWER    Nurse made aware:NO    Patient Turned and repositioned: Independent    Patient Toileted: Independent    Fall Precautions in Place: Yellow non-skid socks on      Electronically signed by Jacquelin Seen on 1/11/22 at 5:51 AM EST

## 2022-01-11 NOTE — PLAN OF CARE
Problem: Risk for Fluid Volume Deficit  Goal: Maintain normal heart rhythm  1/11/2022 1120 by Dixon Priest RN  Outcome: Ongoing  1/10/2022 2313 by Jessica Foley RN  Outcome: Ongoing     Problem: Fatigue  Goal: Verbalize increase energy and improved vitality  1/11/2022 1120 by Dixon Priest RN  Outcome: Ongoing  1/10/2022 2313 by Jessica Foley RN  Outcome: Ongoing     Problem: Depressive Behavior With or Without Suicide Precautions:  Goal: Able to verbalize acceptance of life and situations over which he or she has no control  Description: Able to verbalize acceptance of life and situations over which he or she has no control  1/11/2022 1120 by Dixon Priest RN  Outcome: Ongoing  1/10/2022 2313 by Jessica Foley RN  Outcome: Ongoing    Pt is frequently at desk and requesting drinks, snacks, PRN's meds and is invasive. Intrusive, rude, repeatedly cusses. He jumps from one idea to another, becoming more grandiose as the day continues. He jumps around and is somatic with things such as numb lips from sleeping on them that are healed only by drinking a combo applejuice and cranberry juice. He has a need to be center of attention and is constantly calling out staff names to look at him \"perform\" such things as [place] , [address] [place] [address] [employer] for which he  states he is a  in, the only one in [place]. He has many marvelous accolades such as these according to the patient. He then proceeds to do rolling high kicks and is able to be redirected but only for a short period of time then he reverts back. He does take all his scheduled meds without difficulty. Its double portions every meal and works out on the elliptical bike quite a lot. Denies SI/HI no AVH and no RTIS. No new Covid s/s.  Lungs clear, afebrile no cough, +congestion

## 2022-01-11 NOTE — BH NOTE
Collateral Contact:  Name: Alison Irby  Phone: [phone]  Relation to Patient: Mother  Last Contact with Patient: Today, by phone    Concerns:     Per Rodney Wiley: Pt had \"a psychotic break\" and became \"manic\" in September 2021, and he has not been stable since. He has had multiple in-pt psychiatric admissions in the past 1-2 mths (1st admit was around end of November 2021). He was at [facility] (in [place]), was released for about a day and was readmitted to [facility]). He was discharged from [facility], was out for about a day or 2, and he was admitted back at [facility]. He was discharged from [facility] on 1/8/22. Pt was started on long acting Abilify injection while at [facility]. He is due for the next injection on 1/27. Rodney Wiley stated the overall goal is for pt to return home to live with them, but she does not feel like they can manage him at home in his current state. She stated pt still seems very \"manic\" to them and is acting in way completely abnormal for him. She stated pt has been physically aggressive towards his father, and he has never shown any physical aggression towards them in the past. He also came into their bedroom asking for his father's gun. He told them he had to [facility], because she was sick, and her face was changing. She stated pt has no history of saying things like this to them. Pt has a hx of out-pt and in-pt mental health treatment and has been on various medications in the past. He has done well on [place]. He stopped taking Vraylar, because he decided he was allergic to it. He has a hx of \"deciding\" he is allergic to a medication and then just refusing to take it anymore. He stopped taking the [facility], because someone told him it would make him sterile. He also has stopped out-pt treatment in the past, because he just decided it wasn't good for him to go anymore. Pt did just complete the intake process at [facility] last week.  The intake was with [facility], but they got a call from someone else, so she does not know who his  actually will be. Pt has been diagnosed with Autism, Mild Developmental Delay, and Bipolar Disorder.

## 2022-01-11 NOTE — BH NOTE
Purposeful Rounding    Patient concerns reported:NONE NOTED.  PT IN DAYROOM SLEEPING IN RECLINER    Nurse made aware:NO    Patient Turned and repositioned: Independent    Patient Toileted: Independent    Fall Precautions in Place: Yellow non-skid socks on, Lighting appropriate, Room free of clutter and Clear path to bathroom      Electronically signed by Willy Baker on 1/11/22 at 1:26 AM EST

## 2022-01-11 NOTE — BH NOTE
Senior Purposeful Rounding     Position:Repositions self     Physical Environment:Room free from clutter, Clear path to bathroom , Adequate lighting and No safety hazards noted     Pain Rating/ Nonverbal Pain Behaviors:0; asleep     Pain interventions Attempted: n/a     Patient Toileted:Independent

## 2022-01-11 NOTE — BH NOTE
Senior Purposeful Rounding     Position:Sitting     Physical Environment:Room free from clutter, Clear path to bathroom , Adequate lighting and No safety hazards noted     Pain Rating/ Nonverbal Pain Behaviors:0; asleep     Pain interventions Attempted: n/a     Patient Toileted:Independent

## 2022-01-11 NOTE — BH NOTE
Senior Purposeful Rounding    Position:Sitting    Physical Environment:Room free from clutter, Clear path to bathroom , Adequate lighting and No safety hazards noted    Pain Rating/ Nonverbal Pain Behaviors:3, baby toe    Pain interventions Attempted: PRN Tylenol     Patient Toileted:Independent

## 2022-01-11 NOTE — BH NOTE
Purposeful Rounding    Patient concerns reported:NONE NOTED.  PT AT [address]    Nurse made aware:NO    Patient Turned and repositioned: Independent    Patient Toileted: Independent    Fall Precautions in Place: Yellow non-skid socks on, Lighting appropriate, Room free of clutter and Clear path to bathroom      Electronically signed by Kalyn Guevara on 1/11/22 at 3:07 AM EST

## 2022-01-11 NOTE — BH NOTE
Senior Purposeful Rounding     Position:Sitting in recliner     Physical Environment:Room free from clutter, Clear path to bathroom , Adequate lighting and No safety hazards noted     Pain Rating/ Nonverbal Pain Behaviors:0; asleep     Pain interventions Attempted: n/a     Patient Toileted:Independent

## 2022-01-11 NOTE — BH NOTE
Purposeful Rounding    Patient concerns reported:NONE NOTED.  PT WATCHING TV    Nurse made aware:NO    Patient Turned and repositioned: Independent    Patient Toileted: Independent    Fall Precautions in Place: Yellow non-skid socks on, Lighting appropriate, Room free of clutter and Clear path to bathroom      Electronically signed by Claudia Garza on 1/10/22 at 9:17 PM EST

## 2022-01-11 NOTE — PLAN OF CARE
Sabrina Wilson has frequently been at the desk with many requests. He is hyper verbal and grandiose. Med seeking. Asking for \"shots\" of Ativan and Geodon. Did take PRN Zyprexa PO @ 3217 as ordered. VSS. No physical complaints. Compliant with HS medication. Denies SI/HI/AVH. No aggressive behavior. Will continue to monitor.

## 2022-01-12 LAB
BILIRUBIN URINE: NEGATIVE
BLOOD, URINE: NEGATIVE
CLARITY: CLEAR
COLOR: YELLOW
GLUCOSE URINE: NEGATIVE MG/DL
KETONES, URINE: NEGATIVE MG/DL
LEUKOCYTE ESTERASE, URINE: NEGATIVE
MICROSCOPIC EXAMINATION: NORMAL
NITRITE, URINE: NEGATIVE
PH UA: 6.5 (ref 5–8)
PROTEIN UA: NEGATIVE MG/DL
SPECIFIC GRAVITY UA: <=1.005 (ref 1–1.03)
URINE REFLEX TO CULTURE: NORMAL
URINE TYPE: NORMAL
UROBILINOGEN, URINE: 0.2 E.U./DL

## 2022-01-12 PROCEDURE — 6360000002 HC RX W HCPCS: Performed by: PSYCHIATRY & NEUROLOGY

## 2022-01-12 PROCEDURE — 99233 SBSQ HOSP IP/OBS HIGH 50: CPT | Performed by: PSYCHIATRY & NEUROLOGY

## 2022-01-12 PROCEDURE — 1240000000 HC EMOTIONAL WELLNESS R&B

## 2022-01-12 PROCEDURE — 6370000000 HC RX 637 (ALT 250 FOR IP): Performed by: PSYCHIATRY & NEUROLOGY

## 2022-01-12 PROCEDURE — 87491 CHLMYD TRACH DNA AMP PROBE: CPT

## 2022-01-12 PROCEDURE — 81003 URINALYSIS AUTO W/O SCOPE: CPT

## 2022-01-12 PROCEDURE — 87591 N.GONORRHOEAE DNA AMP PROB: CPT

## 2022-01-12 PROCEDURE — 6360000002 HC RX W HCPCS

## 2022-01-12 RX ORDER — LORAZEPAM 2 MG/ML
2 INJECTION INTRAMUSCULAR EVERY 6 HOURS PRN
Status: DISCONTINUED | OUTPATIENT
Start: 2022-01-12 | End: 2022-01-19 | Stop reason: HOSPADM

## 2022-01-12 RX ORDER — LORAZEPAM 2 MG/1
2 TABLET ORAL EVERY 6 HOURS PRN
Status: DISCONTINUED | OUTPATIENT
Start: 2022-01-12 | End: 2022-01-19 | Stop reason: HOSPADM

## 2022-01-12 RX ORDER — LORAZEPAM 2 MG/ML
2 INJECTION INTRAMUSCULAR ONCE
Status: COMPLETED | OUTPATIENT
Start: 2022-01-12 | End: 2022-01-12

## 2022-01-12 RX ADMIN — NICOTINE POLACRILEX 2 MG: 2 LOZENGE ORAL at 03:46

## 2022-01-12 RX ADMIN — NICOTINE POLACRILEX 2 MG: 2 LOZENGE ORAL at 07:11

## 2022-01-12 RX ADMIN — HYDROXYZINE PAMOATE 50 MG: 25 CAPSULE ORAL at 17:04

## 2022-01-12 RX ADMIN — LORAZEPAM 2 MG: 2 INJECTION INTRAMUSCULAR; INTRAVENOUS at 13:43

## 2022-01-12 RX ADMIN — Medication 1 CAPSULE: at 08:06

## 2022-01-12 RX ADMIN — OLANZAPINE 10 MG: 10 TABLET, FILM COATED ORAL at 08:04

## 2022-01-12 RX ADMIN — CARIPRAZINE 3 MG: 3 CAPSULE, GELATIN COATED ORAL at 08:06

## 2022-01-12 RX ADMIN — KETOROLAC TROMETHAMINE 30 MG: 30 INJECTION, SOLUTION INTRAMUSCULAR; INTRAVENOUS at 09:01

## 2022-01-12 RX ADMIN — ALPRAZOLAM 0.5 MG: 0.25 TABLET ORAL at 04:51

## 2022-01-12 RX ADMIN — KETOROLAC TROMETHAMINE 30 MG: 30 INJECTION, SOLUTION INTRAMUSCULAR; INTRAVENOUS at 17:58

## 2022-01-12 RX ADMIN — ACETAMINOPHEN 650 MG: 325 TABLET ORAL at 07:10

## 2022-01-12 RX ADMIN — NICOTINE POLACRILEX 2 MG: 2 LOZENGE ORAL at 15:18

## 2022-01-12 RX ADMIN — SALINE NASAL SPRAY 1 SPRAY: 1.5 SOLUTION NASAL at 13:44

## 2022-01-12 RX ADMIN — NICOTINE POLACRILEX 2 MG: 2 LOZENGE ORAL at 18:15

## 2022-01-12 RX ADMIN — CARBOXYMETHYLCELLULOSE SODIUM 1 DROP: 10 GEL OPHTHALMIC at 15:18

## 2022-01-12 RX ADMIN — SALINE NASAL SPRAY 1 SPRAY: 1.5 SOLUTION NASAL at 09:36

## 2022-01-12 RX ADMIN — MAGNESIUM HYDROXIDE/ALUMINUM HYDROXICE/SIMETHICONE 30 ML: 120; 1200; 1200 SUSPENSION ORAL at 17:04

## 2022-01-12 RX ADMIN — HYDROXYZINE PAMOATE 50 MG: 25 CAPSULE ORAL at 00:06

## 2022-01-12 RX ADMIN — NICOTINE POLACRILEX 2 MG: 2 LOZENGE ORAL at 08:06

## 2022-01-12 RX ADMIN — SALINE NASAL SPRAY 1 SPRAY: 1.5 SOLUTION NASAL at 17:58

## 2022-01-12 RX ADMIN — ACETAMINOPHEN 650 MG: 325 TABLET ORAL at 00:04

## 2022-01-12 RX ADMIN — TRAZODONE HYDROCHLORIDE 50 MG: 50 TABLET ORAL at 00:06

## 2022-01-12 RX ADMIN — NICOTINE POLACRILEX 2 MG: 2 LOZENGE ORAL at 10:15

## 2022-01-12 RX ADMIN — NICOTINE POLACRILEX 2 MG: 2 LOZENGE ORAL at 12:19

## 2022-01-12 RX ADMIN — Medication 10 MG: at 20:12

## 2022-01-12 RX ADMIN — CARBOXYMETHYLCELLULOSE SODIUM 1 DROP: 10 GEL OPHTHALMIC at 03:50

## 2022-01-12 RX ADMIN — NICOTINE POLACRILEX 2 MG: 2 LOZENGE ORAL at 17:04

## 2022-01-12 ASSESSMENT — PAIN SCALES - GENERAL
PAINLEVEL_OUTOF10: 6
PAINLEVEL_OUTOF10: 5
PAINLEVEL_OUTOF10: 7
PAINLEVEL_OUTOF10: 10
PAINLEVEL_OUTOF10: 10
PAINLEVEL_OUTOF10: 5
PAINLEVEL_OUTOF10: 8

## 2022-01-12 ASSESSMENT — PAIN DESCRIPTION - FREQUENCY
FREQUENCY: CONTINUOUS

## 2022-01-12 ASSESSMENT — PAIN DESCRIPTION - PAIN TYPE
TYPE: ACUTE PAIN

## 2022-01-12 ASSESSMENT — PAIN DESCRIPTION - DESCRIPTORS
DESCRIPTORS: ACHING
DESCRIPTORS: CONSTANT
DESCRIPTORS: ACHING
DESCRIPTORS: ACHING

## 2022-01-12 ASSESSMENT — PAIN DESCRIPTION - ONSET
ONSET: PROGRESSIVE
ONSET: PROGRESSIVE
ONSET: ON-GOING
ONSET: ON-GOING

## 2022-01-12 ASSESSMENT — PAIN - FUNCTIONAL ASSESSMENT
PAIN_FUNCTIONAL_ASSESSMENT: ACTIVITIES ARE NOT PREVENTED

## 2022-01-12 ASSESSMENT — PAIN DESCRIPTION - PROGRESSION
CLINICAL_PROGRESSION: GRADUALLY IMPROVING
CLINICAL_PROGRESSION: GRADUALLY WORSENING
CLINICAL_PROGRESSION: GRADUALLY WORSENING

## 2022-01-12 ASSESSMENT — PAIN DESCRIPTION - LOCATION
LOCATION: TOE (COMMENT WHICH ONE)
LOCATION: LEG

## 2022-01-12 ASSESSMENT — PAIN DESCRIPTION - DIRECTION: RADIATING_TOWARDS: ANKLE

## 2022-01-12 ASSESSMENT — PAIN DESCRIPTION - ORIENTATION
ORIENTATION: RIGHT
ORIENTATION: RIGHT
ORIENTATION: LEFT

## 2022-01-12 NOTE — BH NOTE
Pt c/o right pinky toe pain 10/10 d/t fx. PRN Toradol administered to right thigh without issue. Pt wearing boot. Pacing unit at this time. Will continue to monitor. 1015-Pt reports pain now 7/10. Declined all non-pharm interventions except music. Requested \"another Toradol shot as soon as I can get it\".

## 2022-01-12 NOTE — PROGRESS NOTES
Patient given Tylenol 650mg for right toe pain, Trazodone 50mg for sleep and Vistaril 50 for anxiety per request.medication effective patient resting in bed eyes closed

## 2022-01-12 NOTE — BH NOTE
Purposeful Rounding    Patient concerns reported: NONE NOTED.  PT AT NURSING STATION     Nurse made aware: NO     Patient Turned and repositioned: Independent     Patient Toileted: Independent     Fall Precautions in Place: Yellow non-skid socks on, Lighting appropriate, Room free of clutter and Clear path to bathroom      Electronically signed by Bianca Sanchez on 1/11/22 at 11:37 PM EST

## 2022-01-12 NOTE — FLOWSHEET NOTE
Senior Purposeful Rounding    Position:Sitting pt on exercise bike    Physical Environment:No safety hazards noted    Pain Rating/ Nonverbal Pain Behaviors:0; none    Pain interventions Attempted: NA    Patient Toileted:Independent

## 2022-01-12 NOTE — BH NOTE
Pt states he is \"agitated inside\" and requested Zyprexa. Pt is very argumentative and verbally aggressive when verbally redirected. Intrusive with other patients. Manic. Demanding. PRN Zyprexa administered at this time. Will continue to monitor. 0900-Pt is still demanding and gets verbally aggressive when boundaries and limits are set. Making multiple phone calls. 0956-Oppositional behavior continues. Text message sent to Dr. Rylee Le to call unit when available.

## 2022-01-12 NOTE — PLAN OF CARE
Patient denies SI/HI, AVH and pain. Patient very intrusive with pressured speech. Patient jumps from one topic to the next. Patient frequently requesting food and other items. No change in Covid symptoms, voices no concerns. No aggressive outbursts or self harming behaviors.

## 2022-01-12 NOTE — FLOWSHEET NOTE
Senior Purposeful Rounding    Position:Repositions Self    Physical Environment:Room free from clutter, Clear path to bathroom , Adequate lighting and No safety hazards noted    Pain Rating/ Nonverbal Pain Behaviors:0; none    Pain interventions Attempted:NA    Patient Toileted:Independent

## 2022-01-12 NOTE — PLAN OF CARE
Problem: Altered Mood, Manic Behavior:  Goal: Able to verbalize decrease in frequency and intensity of racing thoughts  Description: Able to verbalize decrease in frequency and intensity of racing thoughts  Outcome: Ongoing     Problem: Altered Mood, Manic Behavior:  Goal: Ability to interact with others will improve  Description: Ability to interact with others will improve  Outcome: Ongoing     Problem: Altered Mood, Manic Behavior:  Goal: Ability to demonstrate self-control will improve  Description: Ability to demonstrate self-control will improve  Outcome: Ongoing  Pt has remained free from falls and injury so far this shift. Med compliant. Many requests for PRNs. Denies SI/HI, AVH. No RTIS noted. Labile, verbally aggressive when redirected, increased agitation with boundaries and limits. Makes many phone calls and has to be redirected about volume and verbage often. Grandiose. Hyperverbal. Tangential. Intrusive with other pts. PRN zyprexa not effective. PRN Toradol IM somewhat effective for right pinky toe pain. Order for pt to wear boot for support, but he takes it off after a few minutes of wear. Jumps around dayroom and exercises frequently on bikes with no issue. Pt showered, sat and colored, listened to music, and was much less hyperverbal after 2mg Ativan IM administered. Nonskid footwear on. Will continue to monitor.

## 2022-01-12 NOTE — FLOWSHEET NOTE
Senior Purposeful Rounding    Position:Repositions Self    Physical Environment:No safety hazards noted    Pain Rating/ Nonverbal Pain Behaviors:0; none    Pain interventions Attempted: NA    Patient Toileted:Independent

## 2022-01-12 NOTE — BH NOTE
Pt tearful at times, c/o anxiety. Request Maalox, Vistaril, and nicotine lozenge. Administered at this time. Will continue to monitor. 1738-Pt sitting quietly watercoloring. Pressured speech improved. Will continue to monitor.

## 2022-01-12 NOTE — BH NOTE
Pt continues to be demanding and argumentative with all staff across morning thus far. Becomes verbally aggressive when limits are set by staff - adamantly refusing all limits set continuing to exhibit oppositional behavior across all interactions. 1300 Pt continues to display demanding behavior with staff, frequently requesting items and services before moving to other topics and requests. This writer provided pt with preferred resources for leisure activity, creating art in day space. Pt unable to focus on single tasks, moving through subjects and activities quickly.     Cedrick Lee, MM, MT-BC

## 2022-01-12 NOTE — BH NOTE
Pt given Ativan 2 mg IM schedule once in left deltoid without issue. Will continue to monitor. 1445-Ativan effective. Pt is less intense. Showered and is now listening to music in the dayroom. Will continue to monitor.

## 2022-01-12 NOTE — BH NOTE
Purposeful Rounding    Patient concerns reported:NONE NOTED.  PT RESTING IN SENSORY ROOM     Nurse made aware:NO     Patient Turned and repositioned: Independent     Patient Toileted: Independent     Fall Precautions in Place: Yellow non-skid socks on, Lighting appropriate, Room free of clutter and Clear path to bathroom      Electronically signed by Ehsan Park on 1/11/22 at 9:25 PM EST

## 2022-01-12 NOTE — PROGRESS NOTES
Department of Psychiatry  AttendingProgress Note  Chief Complaint: manic  Norman appears pressured, overly talkative and tangential. He requested 3-4 meds in a matter of 30 seconds and then moved on to other topics quickly, such as going to [employer] [facility] [employer], getting his headset and laptop to listen to music. His insight is limited . Intrusive and grandiose. No aggressive behavior. Started talking in various languages  Patient's chart was reviewed and collaborated with  about the treatment plan. SUBJECTIVE:    Patient is feeling better. Suicidal ideation:  denies suicidal ideation. Patient does not have medication side effects. ROS: Patient has new complaints: no  Sleeping adequately:  Yes   Appetite adequate: Yes  Attending groups: Yes  Visitors:No    OBJECTIVE    Physical  VITALS:  /70   Pulse 86   Temp 98.7 °F (37.1 °C) (Oral)   Resp 20   Ht 6' (1.829 m)   Wt 190 lb (86.2 kg)   SpO2 100%   BMI 25.77 kg/m²     Mental Status Examination:  Patients appearance was street clothes. Thoughts are Flight of ideas. Homicidal ideations none. No abnormal movements, tics or mannerisms. Memory intact Aims 0. Concentration Good. Alert and oriented X 4. Insight and Judgement impaired insight. Patient was distracted.  Patient gait normal. Mood labile, affect elevated affect Hallucinations Absent, suicidal ideations no specific plan to harm self Speech pressured  Data  Labs:   Admission on 01/09/2022   Component Date Value Ref Range Status    Amphetamine Screen, Urine 01/09/2022 Neg  Negative <1000ng/mL Final    Barbiturate Screen, Ur 01/09/2022 Neg  Negative <200 ng/mL Final    Benzodiazepine Screen, Urine 01/09/2022 Neg  Negative <200 ng/mL Final    Cannabinoid Scrn, Ur 01/09/2022 POSITIVE* Negative <50 ng/mL Final    Cocaine Metabolite Screen, Urine 01/09/2022 Neg  Negative <300 ng/mL Final    Opiate Scrn, Ur 01/09/2022 Neg  Negative <300 ng/mL Final    Comment: \"Therapeutic levels of pain medication, especially oxycontin and synthetic  opioids, may not be detected by this Methodology. Pain management screen  panel  Drug panel-PM-Hi Res Ur, Interp (PAIN) should be considered for drug  monitoring \".  PCP Screen, Urine 01/09/2022 Neg  Negative <25 ng/mL Final    Methadone Screen, Urine 01/09/2022 Neg  Negative <300 ng/mL Final    Propoxyphene Scrn, Ur 01/09/2022 Neg  Negative <300 ng/mL Final    Oxycodone Urine 01/09/2022 Neg  Negative <100 ng/ml Final    pH, UA 01/09/2022 6.0   Final    Comment: Urine pH less than 5.0 or greater than 8.0 may indicate sample adulteration. Another sample should be collected if clinically  indicated.  Drug Screen Comment: 01/09/2022 see below   Final    Comment: This method is a screening test to detect only these drug  classes as part of a medical workup. Confirmatory testing  by another method should be ordered if clinically indicated.  Color, UA 01/09/2022 Straw  Straw/Yellow Final    Clarity, UA 01/09/2022 Clear  Clear Final    Glucose, Ur 01/09/2022 Negative  Negative mg/dL Final    Bilirubin Urine 01/09/2022 Negative  Negative Final    Ketones, Urine 01/09/2022 Negative  Negative mg/dL Final    Specific Gravity, UA 01/09/2022 1.010  1.005 - 1.030 Final    Blood, Urine 01/09/2022 Negative  Negative Final    pH, UA 01/09/2022 6.0  5.0 - 8.0 Final    Protein, UA 01/09/2022 Negative  Negative mg/dL Final    Urobilinogen, Urine 01/09/2022 0.2  <2.0 E.U./dL Final    Nitrite, Urine 01/09/2022 Negative  Negative Final    Leukocyte Esterase, Urine 01/09/2022 Negative  Negative Final    Microscopic Examination 01/09/2022 Not Indicated   Final    Urine Type 01/09/2022 NotGiven   Final    Urine received in a container without preservatives.     Urine Reflex to Culture 01/09/2022 Not Indicated   Final    Sodium 01/09/2022 140  136 - 145 mmol/L Final    Potassium 01/09/2022 4.1  3.5 - 5.1 mmol/L Final    Chloride 01/09/2022 104  99 - 110 mmol/L Final    CO2 01/09/2022 28  21 - 32 mmol/L Final    Anion Gap 01/09/2022 8  3 - 16 Final    Glucose 01/09/2022 79  70 - 99 mg/dL Final    BUN 01/09/2022 10  7 - 20 mg/dL Final    CREATININE 01/09/2022 0.6* 0.9 - 1.3 mg/dL Final    GFR Non- 01/09/2022 >60  >60 Final    Comment: >60 mL/min/1.73m2 EGFR, calc. for ages 25 and older using the  MDRD formula (not corrected for weight), is valid for stable  renal function.  GFR  01/09/2022 >60  >60 Final    Comment: Chronic Kidney Disease: less than 60 ml/min/1.73 sq.m. Kidney Failure: less than 15 ml/min/1.73 sq.m. Results valid for patients 18 years and older.       Calcium 01/09/2022 9.1  8.3 - 10.6 mg/dL Final    Total Protein 01/09/2022 7.1  6.4 - 8.2 g/dL Final    Albumin 01/09/2022 4.1  3.4 - 5.0 g/dL Final    Albumin/Globulin Ratio 01/09/2022 1.4  1.1 - 2.2 Final    Total Bilirubin 01/09/2022 1.2* 0.0 - 1.0 mg/dL Final    Alkaline Phosphatase 01/09/2022 54  40 - 129 U/L Final    ALT 01/09/2022 34  10 - 40 U/L Final    AST 01/09/2022 38* 15 - 37 U/L Final    WBC 01/09/2022 6.2  4.0 - 11.0 K/uL Final    RBC 01/09/2022 4.27  4.20 - 5.90 M/uL Final    Hemoglobin 01/09/2022 13.1* 13.5 - 17.5 g/dL Final    Hematocrit 01/09/2022 39.5* 40.5 - 52.5 % Final    MCV 01/09/2022 92.4  80.0 - 100.0 fL Final    MCH 01/09/2022 30.6  26.0 - 34.0 pg Final    MCHC 01/09/2022 33.1  31.0 - 36.0 g/dL Final    RDW 01/09/2022 12.6  12.4 - 15.4 % Final    Platelets 64/68/5902 266  135 - 450 K/uL Final    MPV 01/09/2022 7.6  5.0 - 10.5 fL Final    Neutrophils % 01/09/2022 47.9  % Final    Lymphocytes % 01/09/2022 37.8  % Final    Monocytes % 01/09/2022 11.6  % Final    Eosinophils % 01/09/2022 1.9  % Final    Basophils % 01/09/2022 0.8  % Final    Neutrophils Absolute 01/09/2022 3.0  1.7 - 7.7 K/uL Final    Lymphocytes Absolute 01/09/2022 2.3  1.0 - 5.1 K/uL Final    Monocytes Absolute 01/09/2022 0.7  0.0 - 1.3 K/uL Final    Eosinophils Absolute 01/09/2022 0.1  0.0 - 0.6 K/uL Final    Basophils Absolute 01/09/2022 0.1  0.0 - 0.2 K/uL Final    Acetaminophen Level 01/09/2022 <5* 10 - 30 ug/mL Final    Comment: Therapeutic Range: 10.0-30.0 ug/mL  Toxic: >=431 ug/mL      Salicylate, Serum 34/53/4593 <0.3* 15.0 - 30.0 mg/dL Final    Comment: Therapeutic Range: 15.0-30.0 mg/dL  Toxic: >30.0 mg/dL      Ethanol Lvl 01/09/2022 None Detected  mg/dL Final    Comment:    None Detected  Conversion factor:  100 mg/dl = .100 g/dl  For Medical Purposes Only      SARS-CoV-2 RNA, RT PCR 01/09/2022 DETECTED* NOT DETECTED Final    Comment: Detected results are indicative of the presence of SARS-CoV-2,  clinical correlation with patient history and other diagnostic  information is necessary to determine patient infection status. A Detected results do not rule out bacterial infection or  co-infection with other pathogens. Critical called to and read back by Dr. Mariposa Martinez DR 01/09/2022  20:04  Testing was performed using KRYSTIAN NANCY SARS-CoV-2 and Influenza A/B  nucleic acid assay. This test is a multiplex Real-Time Reverse  Transcriptase Polymerase Chain Reaction (RT-PCR)-based in vitro  diagnostic test intended for the qualitative detection of nucleic  acids from SARS-CoV-2, influenza A, and influenza B in nasopharyngeal  and nasal swab specimens for use under the FDAs Emergency Use  Authorization (EUA) only. Patient Fact Sheet:  FindDrives.pl  Provider Fact Sheet: FindDrives.pl  EUA: FindDrives.pl  IFU: PayResort.com.pt                           87/download    Methodology:  RT-PCR      INFLUENZA A 01/09/2022 NOT DETECTED  NOT DETECTED Final    Comment: Note:  Influenza A and Influenza B negative results should be considered  presumptive in samples that have a Detected SARS-CoV-2 result.   Consider  re-testing with an alternate 10 mg, IntraMUSCular, Q8H PRN  sterile water injection 2.1 mL, 2.1 mL, IntraMUSCular, Q4H PRN  diphenhydrAMINE (BENADRYL) injection 50 mg, 50 mg, IntraMUSCular, Q4H PRN  traZODone (DESYREL) tablet 50 mg, 50 mg, Oral, Nightly PRN  ALPRAZolam (XANAX) tablet 0.5 mg, 0.5 mg, Oral, Daily PRN  ARIPiprazole (ABILIFY) tablet 10 mg, 10 mg, Oral, Nightly  lactobacillus (CULTURELLE) capsule 1 capsule, 1 capsule, Oral, Daily  melatonin disintegrating tablet 10 mg, 10 mg, Oral, Nightly    ASSESSMENT AND PLAN    Principal Problem:    Autistic spectrum disorder  Active Problems:    Bipolar affective disorder, current episode hypomanic ([address]    COVID-19    Closed displaced fracture of proximal phalanx of lesser toe of right foot    Marijuana use    Tobacco dependence  Resolved Problems:    * No resolved hospital problems. *       1. Patient s symptoms   are improving  2. Probable discharge is next week  3. Discharge planning is incomplete. Has Sarah appt 1/18 with psychiatrist  4. Suicidal ideation is none  5. Total time with patient was 40 minutes and more than 50 % of that time was spent counseling the patient on their symptoms, treatment and expected goals.

## 2022-01-12 NOTE — PROGRESS NOTES
Department of Psychiatry  AttendingProgress Note  Chief Complaint: ray  Francia Bonner is pressured and has difficulty waiting. He is intrusive, with rapid speech and has FOI. He was agreeable to [place] and according to mother, did well . Will switch to Ativan from Xanax for agitation. He is overly engaged with staff and needs frequent redirection. Will dc ABilify Maintena and begin [place] 234 mg IM today . Add Ativan 2 mg . DC Abilify. Patient's chart was reviewed and collaborated with  about the treatment plan. SUBJECTIVE:    Patient is feeling better. Suicidal ideation:  denies suicidal ideation. Patient does not have medication side effects. ROS: Patient has new complaints: no  Sleeping adequately:  Yes   Appetite adequate: Yes  Attending groups: Yes  Visitors:No    OBJECTIVE    Physical  VITALS:  /80   Pulse 77   Temp 97 °F (36.1 °C) (Oral)   Resp 16   Ht 6' (1.829 m)   Wt 190 lb (86.2 kg)   SpO2 98%   BMI 25.77 kg/m²     Mental Status Examination:  Patients appearance was ill-appearing. Thoughts are Flight of ideas. Homicidal ideations none. No abnormal movements, tics or mannerisms. Memory intact Aims 0. Concentration Fair. Alert and oriented X 4. Insight and Judgement impaired insight. Patient was distracted.  Patient gait normal. Mood irritable and labile, affect labile affect Hallucinations Absent, suicidal ideations no specific plan to harm self Speech pressured  Data  Labs:   Admission on 01/09/2022   Component Date Value Ref Range Status    Amphetamine Screen, Urine 01/09/2022 Neg  Negative <1000ng/mL Final    Barbiturate Screen, Ur 01/09/2022 Neg  Negative <200 ng/mL Final    Benzodiazepine Screen, Urine 01/09/2022 Neg  Negative <200 ng/mL Final    Cannabinoid Scrn, Ur 01/09/2022 POSITIVE* Negative <50 ng/mL Final    Cocaine Metabolite Screen, Urine 01/09/2022 Neg  Negative <300 ng/mL Final    Opiate Scrn, Ur 01/09/2022 Neg  Negative <300 ng/mL Final Comment: \"Therapeutic levels of pain medication, especially oxycontin and synthetic  opioids, may not be detected by this Methodology. Pain management screen  panel  Drug panel-PM-Hi Res Ur, Interp (PAIN) should be considered for drug  monitoring \".  PCP Screen, Urine 01/09/2022 Neg  Negative <25 ng/mL Final    Methadone Screen, Urine 01/09/2022 Neg  Negative <300 ng/mL Final    Propoxyphene Scrn, Ur 01/09/2022 Neg  Negative <300 ng/mL Final    Oxycodone Urine 01/09/2022 Neg  Negative <100 ng/ml Final    pH, UA 01/09/2022 6.0   Final    Comment: Urine pH less than 5.0 or greater than 8.0 may indicate sample adulteration. Another sample should be collected if clinically  indicated.  Drug Screen Comment: 01/09/2022 see below   Final    Comment: This method is a screening test to detect only these drug  classes as part of a medical workup. Confirmatory testing  by another method should be ordered if clinically indicated.  Color, UA 01/09/2022 Straw  Straw/Yellow Final    Clarity, UA 01/09/2022 Clear  Clear Final    Glucose, Ur 01/09/2022 Negative  Negative mg/dL Final    Bilirubin Urine 01/09/2022 Negative  Negative Final    Ketones, Urine 01/09/2022 Negative  Negative mg/dL Final    Specific Gravity, UA 01/09/2022 1.010  1.005 - 1.030 Final    Blood, Urine 01/09/2022 Negative  Negative Final    pH, UA 01/09/2022 6.0  5.0 - 8.0 Final    Protein, UA 01/09/2022 Negative  Negative mg/dL Final    Urobilinogen, Urine 01/09/2022 0.2  <2.0 E.U./dL Final    Nitrite, Urine 01/09/2022 Negative  Negative Final    Leukocyte Esterase, Urine 01/09/2022 Negative  Negative Final    Microscopic Examination 01/09/2022 Not Indicated   Final    Urine Type 01/09/2022 NotGiven   Final    Urine received in a container without preservatives.     Urine Reflex to Culture 01/09/2022 Not Indicated   Final    Sodium 01/09/2022 140  136 - 145 mmol/L Final    Potassium 01/09/2022 4.1  3.5 - 5.1 mmol/L Final    Monocytes Absolute 01/09/2022 0.7  0.0 - 1.3 K/uL Final    Eosinophils Absolute 01/09/2022 0.1  0.0 - 0.6 K/uL Final    Basophils Absolute 01/09/2022 0.1  0.0 - 0.2 K/uL Final    Acetaminophen Level 01/09/2022 <5* 10 - 30 ug/mL Final    Comment: Therapeutic Range: 10.0-30.0 ug/mL  Toxic: >=607 ug/mL      Salicylate, Serum 60/89/7912 <0.3* 15.0 - 30.0 mg/dL Final    Comment: Therapeutic Range: 15.0-30.0 mg/dL  Toxic: >30.0 mg/dL      Ethanol Lvl 01/09/2022 None Detected  mg/dL Final    Comment:    None Detected  Conversion factor:  100 mg/dl = .100 g/dl  For Medical Purposes Only      SARS-CoV-2 RNA, RT PCR 01/09/2022 DETECTED* NOT DETECTED Final    Comment: Detected results are indicative of the presence of SARS-CoV-2,  clinical correlation with patient history and other diagnostic  information is necessary to determine patient infection status. A Detected results do not rule out bacterial infection or  co-infection with other pathogens. Critical called to and read back by Dr. Amie Mendez DR 01/09/2022  20:04  Testing was performed using KRY[employer]ANCY SARS-CoV-2 and Influenza A/B  nucleic acid assay. This test is a multiplex Real-Time Reverse  Transcriptase Polymerase Chain Reaction (RT-PCR)-based in vitro  diagnostic test intended for the qualitative detection of nucleic  acids from SARS-CoV-2, influenza A, and influenza B in nasopharyngeal  and nasal swab specimens for use under the FDAs Emergency Use  Authorization (EUA) only. Patient Fact Sheet:  [employer].pl  Provider Fact Sheet: FindDrives.pl  EUA: Find[employer]rives[employer]pl  IFU: [employer].pt                           87/download    Methodology:  RT-PCR      INFLUENZA A 01/09/2022 NOT DETECTED  NOT DETECTED Final    Comment: Note:  Influenza A and Influenza B negative results should be considered  presumptive in samples that have a Detected SARS-CoV-2 result. Consider  re-testing with an alternate FDA-approved test for Flu A & B if clinically  indicated.  INFLUENZA B 01/09/2022 NOT DETECTED  NOT DETECTED Final    Comment: Note:  Influenza A and Influenza B negative results should be considered  presumptive in samples that have a Detected SARS-CoV-2 result. Consider  re-testing with an alternate FDA-approved test for Flu A & B if clinically  indicated.  Cholesterol, Total 01/09/2022 170  0 - 199 mg/dL Final    Triglycerides 01/09/2022 72  0 - 150 mg/dL Final    HDL 01/09/2022 63* 40 - 60 mg/dL Final    LDL Calculated 01/09/2022 93  <100 mg/dL Final    VLDL Cholesterol Calculated 01/09/2022 14  Not Established mg/dL Final    Hemoglobin A1C 01/09/2022 4.7  See comment % Final    Comment: Comment:  Diagnosis of Diabetes: > or = 6.5%  Increased risk of diabetes (Prediabetes): 5.7-6.4%  Glycemic Control: Nonpregnant Adults: <7.0%                    Pregnant: <6.0%        eAG 01/09/2022 88.2  mg/dL Final    TSH 01/09/2022 1.01  0.27 - 4.20 uIU/mL Final    Color, UA 01/12/2022 Yellow  Straw/Yellow Final    Clarity, UA 01/12/2022 Clear  Clear Final    Glucose, Ur 01/12/2022 Negative  Negative mg/dL Final    Bilirubin Urine 01/12/2022 Negative  Negative Final    Ketones, Urine 01/12/2022 Negative  Negative mg/dL Final    Specific Gravity, UA 01/12/2022 <=1.005  1.005 - 1.030 Final    Blood, Urine 01/12/2022 Negative  Negative Final    pH, UA 01/12/2022 6.5  5.0 - 8.0 Final    Protein, UA 01/12/2022 Negative  Negative mg/dL Final    Urobilinogen, Urine 01/12/2022 0.2  <2.0 E.U./dL Final    Nitrite, Urine 01/12/2022 Negative  Negative Final    Leukocyte Esterase, Urine 01/12/2022 Negative  Negative Final    Microscopic Examination 01/12/2022 Not Indicated   Final    Urine Type 01/12/2022 NotGiven   Final    Urine received in a container without preservatives.     Urine Reflex to Culture 01/12/2022 Not Indicated   Final            Medications  Current Facility-Administered Medications: nicotine (NICODERM CQ) 21 MG/24HR 1 patch, 1 patch, TransDERmal, Daily  carboxymethylcellulose PF (THERATEARS) 1 % ophthalmic gel 1 drop, 1 drop, Both Eyes, Q4H PRN  sodium chloride (OCEAN, BABY AYR) 0.65 % nasal spray 1 spray, 1 spray, Each Nostril, Q4H PRN  cariprazine hcl (VRAYLAR) capsule 3 mg, 3 mg, Oral, Daily  ARIPiprazole (ABILIFY) tablet 15 mg, 15 mg, Oral, Nightly  ketorolac (TORADOL) injection 30 mg, 30 mg, IntraMUSCular, Q6H PRN  acetaminophen (TYLENOL) tablet 650 mg, 650 mg, Oral, Q4H PRN  magnesium hydroxide (MILK OF MAGNESIA) 400 MG/5ML suspension 30 mL, 30 mL, Oral, Daily PRN  nicotine polacrilex (COMMIT) lozenge 2 mg, 2 mg, Oral, Q1H PRN  aluminum & magnesium hydroxide-simethicone (MAALOX) 200-200-20 MG/5ML suspension 30 mL, 30 mL, Oral, Q6H PRN  hydrOXYzine (VISTARIL) capsule 50 mg, 50 mg, Oral, TID PRN  OLANZapine (ZYPREXA) tablet 10 mg, 10 mg, Oral, Q8H PRN **OR** OLANZapine (ZYPREXA) injection 10 mg, 10 mg, IntraMUSCular, Q8H PRN  sterile water injection 2.1 mL, 2.1 mL, IntraMUSCular, Q4H PRN  diphenhydrAMINE (BENADRYL) injection 50 mg, 50 mg, IntraMUSCular, Q4H PRN  traZODone (DESYREL) tablet 50 mg, 50 mg, Oral, Nightly PRN  ALPRAZolam (XANAX) tablet 0.5 mg, 0.5 mg, Oral, Daily PRN  lactobacillus (CULTURELLE) capsule 1 capsule, 1 capsule, Oral, Daily  melatonin disintegrating tablet 10 mg, 10 mg, Oral, Nightly    ASSESSMENT AND PLAN    Principal Problem:    Autistic spectrum disorder  Active Problems:    Bipolar affective disorder, current episode hypomanic (HCC)    COVID-19    Closed displaced fracture of proximal phalanx of lesser toe of right foot    Marijuana use    Tobacco dependence  Resolved Problems:    * No resolved hospital problems. *       1. Patient s symptoms   show no change  2. Probable discharge is next week  3. Discharge planning is incomplete  4. Suicidal ideation is none  5.  Total time with patient was 40 minutes and more than 50 % of that time was spent counseling the patient on their symptoms, treatment and expected goals.

## 2022-01-12 NOTE — FLOWSHEET NOTE
Senior Purposeful Rounding    Position:Ambulating in elaine    Physical Environment:No safety hazards noted    Pain Rating/ Nonverbal Pain Behaviors:0:none    Pain interventions Attempted: NA    Patient Toileted:Independent

## 2022-01-13 LAB
C. TRACHOMATIS DNA ,URINE: NEGATIVE
N. GONORRHOEAE DNA, URINE: NEGATIVE

## 2022-01-13 PROCEDURE — 99233 SBSQ HOSP IP/OBS HIGH 50: CPT | Performed by: PSYCHIATRY & NEUROLOGY

## 2022-01-13 PROCEDURE — 1240000000 HC EMOTIONAL WELLNESS R&B

## 2022-01-13 PROCEDURE — 6370000000 HC RX 637 (ALT 250 FOR IP): Performed by: PSYCHIATRY & NEUROLOGY

## 2022-01-13 RX ORDER — HYDROXYZINE PAMOATE 50 MG/1
50 CAPSULE ORAL EVERY 6 HOURS PRN
Status: DISCONTINUED | OUTPATIENT
Start: 2022-01-13 | End: 2022-01-19 | Stop reason: HOSPADM

## 2022-01-13 RX ORDER — KETOROLAC TROMETHAMINE 30 MG/ML
30 INJECTION, SOLUTION INTRAMUSCULAR; INTRAVENOUS EVERY 6 HOURS PRN
Status: DISCONTINUED | OUTPATIENT
Start: 2022-01-13 | End: 2022-01-13

## 2022-01-13 RX ADMIN — CARIPRAZINE 3 MG: 3 CAPSULE, GELATIN COATED ORAL at 08:07

## 2022-01-13 RX ADMIN — HYDROXYZINE PAMOATE 50 MG: 25 CAPSULE ORAL at 11:34

## 2022-01-13 RX ADMIN — MAGNESIUM HYDROXIDE/ALUMINUM HYDROXICE/SIMETHICONE 30 ML: 120; 1200; 1200 SUSPENSION ORAL at 11:33

## 2022-01-13 RX ADMIN — OLANZAPINE 10 MG: 10 TABLET, FILM COATED ORAL at 08:07

## 2022-01-13 RX ADMIN — Medication 10 MG: at 20:24

## 2022-01-13 RX ADMIN — NICOTINE POLACRILEX 2 MG: 2 LOZENGE ORAL at 06:38

## 2022-01-13 RX ADMIN — NICOTINE POLACRILEX 2 MG: 2 LOZENGE ORAL at 09:55

## 2022-01-13 RX ADMIN — NICOTINE POLACRILEX 2 MG: 2 LOZENGE ORAL at 11:33

## 2022-01-13 RX ADMIN — LORAZEPAM 2 MG: 2 TABLET ORAL at 00:40

## 2022-01-13 RX ADMIN — LORAZEPAM 2 MG: 2 TABLET ORAL at 21:48

## 2022-01-13 RX ADMIN — ACETAMINOPHEN 650 MG: 325 TABLET ORAL at 16:41

## 2022-01-13 RX ADMIN — NICOTINE POLACRILEX 2 MG: 2 LOZENGE ORAL at 08:07

## 2022-01-13 RX ADMIN — NICOTINE POLACRILEX 2 MG: 2 LOZENGE ORAL at 04:40

## 2022-01-13 RX ADMIN — LORAZEPAM 2 MG: 2 TABLET ORAL at 14:06

## 2022-01-13 RX ADMIN — ACETAMINOPHEN 650 MG: 325 TABLET ORAL at 08:07

## 2022-01-13 RX ADMIN — NICOTINE POLACRILEX 4 MG: 4 GUM, CHEWING BUCCAL at 18:12

## 2022-01-13 RX ADMIN — ALPRAZOLAM 0.5 MG: 0.25 TABLET ORAL at 08:07

## 2022-01-13 RX ADMIN — ACETAMINOPHEN 650 MG: 325 TABLET ORAL at 20:24

## 2022-01-13 RX ADMIN — SALINE NASAL SPRAY 1 SPRAY: 1.5 SOLUTION NASAL at 20:58

## 2022-01-13 RX ADMIN — MAGNESIUM HYDROXIDE/ALUMINUM HYDROXICE/SIMETHICONE 30 ML: 120; 1200; 1200 SUSPENSION ORAL at 01:10

## 2022-01-13 RX ADMIN — MAGNESIUM HYDROXIDE/ALUMINUM HYDROXICE/SIMETHICONE 30 ML: 120; 1200; 1200 SUSPENSION ORAL at 20:29

## 2022-01-13 RX ADMIN — Medication 1 CAPSULE: at 08:07

## 2022-01-13 RX ADMIN — ACETAMINOPHEN 650 MG: 325 TABLET ORAL at 00:40

## 2022-01-13 RX ADMIN — NICOTINE POLACRILEX 4 MG: 4 GUM, CHEWING BUCCAL at 13:36

## 2022-01-13 ASSESSMENT — PAIN DESCRIPTION - PAIN TYPE
TYPE: ACUTE PAIN

## 2022-01-13 ASSESSMENT — PAIN DESCRIPTION - LOCATION
LOCATION: TOE (COMMENT WHICH ONE)
LOCATION: FOOT
LOCATION: TOE (COMMENT WHICH ONE)

## 2022-01-13 ASSESSMENT — PAIN SCALES - GENERAL
PAINLEVEL_OUTOF10: 0
PAINLEVEL_OUTOF10: 2
PAINLEVEL_OUTOF10: 0
PAINLEVEL_OUTOF10: 5
PAINLEVEL_OUTOF10: 2
PAINLEVEL_OUTOF10: 0
PAINLEVEL_OUTOF10: 3
PAINLEVEL_OUTOF10: 3
PAINLEVEL_OUTOF10: 4
PAINLEVEL_OUTOF10: 5

## 2022-01-13 ASSESSMENT — PAIN DESCRIPTION - DESCRIPTORS
DESCRIPTORS: ACHING
DESCRIPTORS: ACHING

## 2022-01-13 ASSESSMENT — PAIN DESCRIPTION - ORIENTATION: ORIENTATION: LEFT

## 2022-01-13 ASSESSMENT — PAIN DESCRIPTION - FREQUENCY
FREQUENCY: CONTINUOUS
FREQUENCY: CONTINUOUS

## 2022-01-13 ASSESSMENT — PAIN DESCRIPTION - PROGRESSION
CLINICAL_PROGRESSION: GRADUALLY WORSENING
CLINICAL_PROGRESSION: NOT CHANGED

## 2022-01-13 ASSESSMENT — PAIN DESCRIPTION - ONSET
ONSET: ON-GOING
ONSET: ON-GOING

## 2022-01-13 ASSESSMENT — PAIN - FUNCTIONAL ASSESSMENT
PAIN_FUNCTIONAL_ASSESSMENT: ACTIVITIES ARE NOT PREVENTED
PAIN_FUNCTIONAL_ASSESSMENT: ACTIVITIES ARE NOT PREVENTED

## 2022-01-13 NOTE — CARE COORDINATION
Pt agrees to the following Plan Of Care related to behaviors      1. No Cursing at Staff  2. No sleeping in the Sensory Room at night or for naps. 3. No raising voice at staff or other patients  4. Ask for anything needed such as medicine, drinks and snacks every hour at half past example 1230  5. No hanging out at desk. 6. No phone in room   7. No hitting, shoving, or pushing walls or furniture out of anger.

## 2022-01-13 NOTE — FLOWSHEET NOTE
Senior Purposeful Rounding     Position:Repositions Self     Physical Environment:Room free from clutter, Clear path to bathroom , Adequate lighting and No safety hazards noted     Pain Rating/ Nonverbal Pain Behaviors:0; n/a     Pain interventions Attempted: n/a     Patient Toileted:Independent

## 2022-01-13 NOTE — PROGRESS NOTES
Pt came out of room and started cussing at this writer. Very vulgar. 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 He was reminded of the behavior contract he signed and he he then said \"F--k you and this hospital\" he was told to go to room and calm down. Security was called as his behaviors began to escalate. Security arrived and he told the guard to come in and shut the door cause this writer might hear and she is nosey Bitch. Security was in the room for quite awhile.  No PRNs were given as  there was nothing due

## 2022-01-13 NOTE — PLAN OF CARE
Problem: Depressive Behavior With or Without Suicide Precautions:  Goal: Able to verbalize acceptance of life and situations over which he or she has no control  Description: Able to verbalize acceptance of life and situations over which he or she has no control  Outcome: Ongoing     Problem: Depressive Behavior With or Without Suicide Precautions:  Goal: Able to verbalize and/or display a decrease in depressive symptoms  Description: Able to verbalize and/or display a decrease in depressive symptoms  Outcome: Ongoing    Pt has been hyperverbal and grandiose. Often trying to talk with staff and with frequent requests. Limits set for redirection. Pt denies SI/HI/AVH and no RTIS noted. He was medication compliant. He has been focused on all the prn medications he has received today. He accepted snacks and drinks. He is currently resting in bed.  Denies needs currently

## 2022-01-13 NOTE — FLOWSHEET NOTE
Senior Purposeful Rounding    Position:Repositions Self    Physical Environment:Room free from clutter, Clear path to bathroom , Adequate lighting and No safety hazards noted    Pain Rating/ Nonverbal Pain Behaviors:5; toe pain    Pain interventions Attempted: n/a    Patient Toileted:Independent

## 2022-01-13 NOTE — PROGRESS NOTES
Pt became irate and shoved the chair over in the presence of other patients. When corrected by male nurse he began to yell and scream and he called the staff inbread and yelled and cussed he refused to go to room away from other staff and he was excited at the thought of getting an injection. He states he is going call his  because we accused him of pushing the chair and he does not want to go to room he then went up to pt sitting at table and started saying things upsetting him.  Pt then finally went to room and the situation de escalated with out further interventions

## 2022-01-13 NOTE — BH NOTE
Received voicemail message from Rock Ashton (supervisor -[phone]) with [facility]. She was returning my previous call requesting collateral for pt. Attempted to return Lyndsey's call. Left voicemail message requesting a return call.

## 2022-01-13 NOTE — FLOWSHEET NOTE
Senior Purposeful Rounding     Position:Repositions Self     Physical Environment:Room free from clutter, Clear path to bathroom , Adequate lighting and No safety hazards noted     Pain Rating/ Nonverbal Pain Behaviors: none observed     Pain interventions Attempted: n/a     Patient Toileted:Independent

## 2022-01-13 NOTE — PROGRESS NOTES
Department of Psychiatry  AttendingProgress Note  Chief Complaint: psychosis  Melani Carnes continues to be med focused and in particular IM meds. He is asking for IM Geodon, Ativan, Toradol as well as wanting to bring nicotine pouches or vape. I explained that he cannot use those here. Continues to be pressured and manipulative with staff. Is unable to process with staff. He split staff in order to get treatments that one staff deny. We discussed moving to [facility].   He state that he would use a mask but has shown no indication that he would comply. Replaced Xanax with Ativan. Will attempt to use IM meds only for urgent situation. Patient's chart was reviewed and collaborated with  about the treatment plan. SUBJECTIVE:    Patient is feeling better. Suicidal ideation:  denies suicidal ideation. Patient does not have medication side effects. ROS: Patient has new complaints: no  Sleeping adequately:  Yes   Appetite adequate: Yes  Attending groups: Yes  Visitors:No    OBJECTIVE    Physical  VITALS:  BP (!) 144/83   Pulse 84   Temp 97.5 °F (36.4 °C) (Temporal)   Resp 18   Ht 6' (1.829 m)   Wt 190 lb (86.2 kg)   SpO2 98%   BMI 25.77 kg/m²     Mental Status Examination:  Patients appearance was ill-appearing. Thoughts are Illogical. Homicidal ideations none. No abnormal movements, tics or mannerisms. Memory intact Aims 0. Concentration Fair. Alert and oriented X 4. Insight and Judgement impaired insight. Patient was uncooperative.  Patient gait normal. Mood irritable and labile, affect elevated affect Hallucinations Absent, suicidal ideations no specific plan to harm self Speech pressured  Data  Labs:   Admission on 01/09/2022   Component Date Value Ref Range Status    Amphetamine Screen, Urine 01/09/2022 Neg  Negative <1000ng/mL Final    Barbiturate Screen, Ur 01/09/2022 Neg  Negative <200 ng/mL Final    Benzodiazepine Screen, Urine 01/09/2022 Neg  Negative <200 ng/mL Final    Cannabinoid Scrn, Ur 01/09/2022 POSITIVE* Negative <50 ng/mL Final    Cocaine Metabolite Screen, Urine 01/09/2022 Neg  Negative <300 ng/mL Final    Opiate Scrn, Ur 01/09/2022 Neg  Negative <300 ng/mL Final    Comment: \"Therapeutic levels of pain medication, especially oxycontin and synthetic  opioids, may not be detected by this Methodology. Pain management screen  panel  Drug panel-PM-Hi Res Ur, Interp (PAIN) should be considered for drug  monitoring \".  PCP Screen, Urine 01/09/2022 Neg  Negative <25 ng/mL Final    Methadone Screen, Urine 01/09/2022 Neg  Negative <300 ng/mL Final    Propoxyphene Scrn, Ur 01/09/2022 Neg  Negative <300 ng/mL Final    Oxycodone Urine 01/09/2022 Neg  Negative <100 ng/ml Final    pH, UA 01/09/2022 6.0   Final    Comment: Urine pH less than 5.0 or greater than 8.0 may indicate sample adulteration. Another sample should be collected if clinically  indicated.  Drug Screen Comment: 01/09/2022 see below   Final    Comment: This method is a screening test to detect only these drug  classes as part of a medical workup. Confirmatory testing  by another method should be ordered if clinically indicated.       Color, UA 01/09/2022 Straw  Straw/Yellow Final    Clarity, UA 01/09/2022 Clear  Clear Final    Glucose, Ur 01/09/2022 Negative  Negative mg/dL Final    Bilirubin Urine 01/09/2022 Negative  Negative Final    Ketones, Urine 01/09/2022 Negative  Negative mg/dL Final    Specific Gravity, UA 01/09/2022 1.010  1.005 - 1.030 Final    Blood, Urine 01/09/2022 Negative  Negative Final    pH, UA 01/09/2022 6.0  5.0 - 8.0 Final    Protein, UA 01/09/2022 Negative  Negative mg/dL Final    Urobilinogen, Urine 01/09/2022 0.2  <2.0 E.U./dL Final    Nitrite, Urine 01/09/2022 Negative  Negative Final    Leukocyte Esterase, Urine 01/09/2022 Negative  Negative Final    Microscopic Examination 01/09/2022 Not Indicated   Final    Urine Type 01/09/2022 NotGiven   Final    Urine received in a container without preservatives.  Urine Reflex to Culture 01/09/2022 Not Indicated   Final    Sodium 01/09/2022 140  136 - 145 mmol/L Final    Potassium 01/09/2022 4.1  3.5 - 5.1 mmol/L Final    Chloride 01/09/2022 104  99 - 110 mmol/L Final    CO2 01/09/2022 28  21 - 32 mmol/L Final    Anion Gap 01/09/2022 8  3 - 16 Final    Glucose 01/09/2022 79  70 - 99 mg/dL Final    BUN 01/09/2022 10  7 - 20 mg/dL Final    CREATININE 01/09/2022 0.6* 0.9 - 1.3 mg/dL Final    GFR Non- 01/09/2022 >60  >60 Final    Comment: >60 mL/min/1.73m2 EGFR, calc. for ages 25 and older using the  MDRD formula (not corrected for weight), is valid for stable  renal function.  GFR  01/09/2022 >60  >60 Final    Comment: Chronic Kidney Disease: less than 60 ml/min/1.73 sq.m. Kidney Failure: less than 15 ml/min/1.73 sq.m. Results valid for patients 18 years and older.       Calcium 01/09/2022 9.1  8.3 - 10.6 mg/dL Final    Total Protein 01/09/2022 7.1  6.4 - 8.2 g/dL Final    Albumin 01/09/2022 4.1  3.4 - 5.0 g/dL Final    Albumin/Globulin Ratio 01/09/2022 1.4  1.1 - 2.2 Final    Total Bilirubin 01/09/2022 1.2* 0.0 - 1.0 mg/dL Final    Alkaline Phosphatase 01/09/2022 54  40 - 129 U/L Final    ALT 01/09/2022 34  10 - 40 U/L Final    AST 01/09/2022 38* 15 - 37 U/L Final    WBC 01/09/2022 6.2  4.0 - 11.0 K/uL Final    RBC 01/09/2022 4.27  4.20 - 5.90 M/uL Final    Hemoglobin 01/09/2022 13.1* 13.5 - 17.5 g/dL Final    Hematocrit 01/09/2022 39.5* 40.5 - 52.5 % Final    MCV 01/09/2022 92.4  80.0 - 100.0 fL Final    MCH 01/09/2022 30.6  26.0 - 34.0 pg Final    MCHC 01/09/2022 33.1  31.0 - 36.0 g/dL Final    RDW 01/09/2022 12.6  12.4 - 15.4 % Final    Platelets 30/77/2284 266  135 - 450 K/uL Final    MPV 01/09/2022 7.6  5.0 - 10.5 fL Final    Neutrophils % 01/09/2022 47.9  % Final    Lymphocytes % 01/09/2022 37.8  % Final    Monocytes % 01/09/2022 11.6  % Final    Eosinophils % 01/09/2022 1.9  % Final    Basophils % 01/09/2022 0.8  % Final    Neutrophils Absolute 01/09/2022 3.0  1.7 - 7.7 K/uL Final    Lymphocytes Absolute 01/09/2022 2.3  1.0 - 5.1 K/uL Final    Monocytes Absolute 01/09/2022 0.7  0.0 - 1.3 K/uL Final    Eosinophils Absolute 01/09/2022 0.1  0.0 - 0.6 K/uL Final    Basophils Absolute 01/09/2022 0.1  0.0 - 0.2 K/uL Final    Acetaminophen Level 01/09/2022 <5* 10 - 30 ug/mL Final    Comment: Therapeutic Range: 10.0-30.0 ug/mL  Toxic: >=899 ug/mL      Salicylate, Serum 80/24/7414 <0.3* 15.0 - 30.0 mg/dL Final    Comment: Therapeutic Range: 15.0-30.0 mg/dL  Toxic: >30.0 mg/dL      Ethanol Lvl 01/09/2022 None Detected  mg/dL Final    Comment:    None Detected  Conversion factor:  100 mg/dl = .100 g/dl  For Medical Purposes Only      SARS-CoV-2 RNA, RT PCR 01/09/2022 DETECTED* NOT DETECTED Final    Comment: Detected results are indicative of the presence of SARS-CoV-2,  clinical correlation with patient history and other diagnostic  information is necessary to determine patient infection status. A Detected results do not rule out bacterial infection or  co-infection with other pathogens. Critical called to and read back by Dr. Glenn Mauricio DR 01/09/2022  20:04  Testing was performed using KRYSTIAN NANCY SARS-CoV-2 and Influenza A/B  nucleic acid assay. This test is a multiplex Real-Time Reverse  Transcriptase Polymerase Chain Reaction (RT-PCR)-based in vitro  diagnostic test intended for the qualitative detection of nucleic  acids from SARS-CoV-2, influenza A, and influenza B in nasopharyngeal  and nasal swab specimens for use under the FDAs Emergency Use  Authorization (EUA) only.     Patient Fact Sheet:  FindDrives.pl  Provider Fact Sheet: FindDrives.pl  EUA: FindDrives.pl  IFU: PayResort.com.pt                           87/download    Methodology:  RT-PCR      INFLUENZA A 01/09/2022 NOT DETECTED  NOT DETECTED Final    Comment: Note:  Influenza A and Influenza B negative results should be considered  presumptive in samples that have a Detected SARS-CoV-2 result. Consider  re-testing with an alternate FDA-approved test for Flu A & B if clinically  indicated.  INFLUENZA B 01/09/2022 NOT DETECTED  NOT DETECTED Final    Comment: Note:  Influenza A and Influenza B negative results should be considered  presumptive in samples that have a Detected SARS-CoV-2 result. Consider  re-testing with an alternate FDA-approved test for Flu A & B if clinically  indicated.       Cholesterol, Total 01/09/2022 170  0 - 199 mg/dL Final    Triglycerides 01/09/2022 72  0 - 150 mg/dL Final    HDL 01/09/2022 63* 40 - 60 mg/dL Final    LDL Calculated 01/09/2022 93  <100 mg/dL Final    VLDL Cholesterol Calculated 01/09/2022 14  Not Established mg/dL Final    Hemoglobin A1C 01/09/2022 4.7  See comment % Final    Comment: Comment:  Diagnosis of Diabetes: > or = 6.5%  Increased risk of diabetes (Prediabetes): 5.7-6.4%  Glycemic Control: Nonpregnant Adults: <7.0%                    Pregnant: <6.0%        eAG 01/09/2022 88.2  mg/dL Final    TSH 01/09/2022 1.01  0.27 - 4.20 uIU/mL Final    Color, UA 01/12/2022 Yellow  Straw/Yellow Final    Clarity, UA 01/12/2022 Clear  Clear Final    Glucose, Ur 01/12/2022 Negative  Negative mg/dL Final    Bilirubin Urine 01/12/2022 Negative  Negative Final    Ketones, Urine 01/12/2022 Negative  Negative mg/dL Final    Specific Gravity, UA 01/12/2022 <=1.005  1.005 - 1.030 Final    Blood, Urine 01/12/2022 Negative  Negative Final    pH, UA 01/12/2022 6.5  5.0 - 8.0 Final    Protein, UA 01/12/2022 Negative  Negative mg/dL Final    Urobilinogen, Urine 01/12/2022 0.2  <2.0 E.U./dL Final    Nitrite, Urine 01/12/2022 Negative  Negative Final    Leukocyte Esterase, Urine 01/12/2022 Negative  Negative Final    Microscopic Examination 01/12/2022 Not Indicated   Final    Urine Type 01/12/2022 NotGiven   Final    Urine received in a container without preservatives.     Urine Reflex to Culture 01/12/2022 Not Indicated   Final            Medications  Current Facility-Administered Medications: hydrOXYzine (VISTARIL) capsule 50 mg, 50 mg, Oral, Q6H PRN  nicotine polacrilex (NICORETTE) gum 4 mg, 4 mg, Oral, Q1H PRN  [COMPLETED] paliperidone palmitate ER (INVEGA SUSTENNA) IM injection 234 mg, 234 mg, IntraMUSCular, Once **FOLLOWED BY** [START ON 1/16/2022] paliperidone palmitate ER (INVEGA SUSTENNA) IM injection 156 mg, 156 mg, IntraMUSCular, Once  LORazepam (ATIVAN) tablet 2 mg, 2 mg, Oral, Q6H PRN **OR** LORazepam (ATIVAN) injection 2 mg, 2 mg, IntraMUSCular, Q6H PRN  nicotine (NICODERM CQ) 21 MG/24HR 1 patch, 1 patch, TransDERmal, Daily  carboxymethylcellulose PF (THERATEARS) 1 % ophthalmic gel 1 drop, 1 drop, Both Eyes, Q4H PRN  sodium chloride (OCEAN, BABY AYR) 0.65 % nasal spray 1 spray, 1 spray, Each Nostril, Q4H PRN  cariprazine hcl (VRAYLAR) capsule 3 mg, 3 mg, Oral, Daily  acetaminophen (TYLENOL) tablet 650 mg, 650 mg, Oral, Q4H PRN  magnesium hydroxide (MILK OF MAGNESIA) 400 MG/5ML suspension 30 mL, 30 mL, Oral, Daily PRN  nicotine polacrilex (COMMIT) lozenge 2 mg, 2 mg, Oral, Q1H PRN  aluminum & magnesium hydroxide-simethicone (MAALOX) 200-200-20 MG/5ML suspension 30 mL, 30 mL, Oral, Q6H PRN  OLANZapine (ZYPREXA) tablet 10 mg, 10 mg, Oral, Q8H PRN **OR** OLANZapine (ZYPREXA) injection 10 mg, 10 mg, IntraMUSCular, Q8H PRN  sterile water injection 2.1 mL, 2.1 mL, IntraMUSCular, Q4H PRN  traZODone (DESYREL) tablet 50 mg, 50 mg, Oral, Nightly PRN  lactobacillus (CULTURELLE) capsule 1 capsule, 1 capsule, Oral, Daily  melatonin disintegrating tablet 10 mg, 10 mg, Oral, Nightly    ASSESSMENT AND PLAN    Principal Problem:    Autistic spectrum disorder  Active Problems:    Bipolar affective disorder, current episode hypomanic (HCC)    COVID-19    Closed displaced fracture of proximal phalanx of lesser toe of right foot    Marijuana use    Tobacco dependence  Resolved Problems:    * No resolved hospital problems. *       1. Patient s symptoms   show no change  2. Probable discharge is next week  3. Discharge planning is complete  4. Suicidal ideation is none  5. Total time with patient was 40 minutes and more than 50 % of that time was spent counseling the patient on their symptoms, treatment and expected goals.

## 2022-01-13 NOTE — FLOWSHEET NOTE
Group Therapy Note    Date: 1/13/2022  Start Time: 1000  End Time:  2483  Number of Participants: 4    Type of Group: Music Group    Notes:  Pt present for Music Group. Pt participated by making song selections and singing along to music. Pt also became disruptive intermittently during group and had to be redirected multiple times to focus on music. Participation Level:  Active Listener, Interactive and Monopolizing    Participation Quality: Attentive and Inappropriate      Speech:  normal and loud      Affective Functioning: Exaggerated      Endings: None Reported    Modes of Intervention: Support, Socialization, Activity and Media      Discipline Responsible: Music Group      Fatimah Kaye       01/13/22 1409   Encounter Summary   Services provided to: Patient   Continue Visiting   (1/13 Music Group)   Complexity of Encounter Moderate   Length of Encounter 45 minutes

## 2022-01-13 NOTE — PROGRESS NOTES
Pt came up to the desk asking for Ativan IM. Pt said that he did not like the side effects of the pill but could only take the IM. Pt educated that side effects would be similar since it was the same medication. Pt agreed to take ativan po at 0040. Prn tylenol also given for toe pain. Pt then requested prn maalox at 0110. Prns have been effective. Pt has made multiple requests to staff.  Pt instructed to make requests every 30min

## 2022-01-13 NOTE — BH NOTE
After agreeing to the [address] for behavioral management, pt continues to display argumentative behaviors with staff, describing own mood as \"enraged\". Pt continues to shove and hit furniture as display of rage while challenging all limits set, calling this writer Esteban Meredith mother fucker\" as well as calling a nurse a \"grouchy bitch\". On phone with mother pt stated \"I make my own rules, this place works for me. \"    Pt shows and verbalizes adequate insight into these behaviors as discussed in Plan of Care, continues to show no willingness to comply with concrete limits set by staff. This writer will limit verbal engagement with this pt, use single commands, and provide resources for leisure as appropriate (pt repeatedly requesting painting materials, this writer believes unsafe to provide pt with paint brush unless under close observation).     Janice Merino, MM, MT-BC

## 2022-01-14 PROCEDURE — 1240000000 HC EMOTIONAL WELLNESS R&B

## 2022-01-14 PROCEDURE — 6370000000 HC RX 637 (ALT 250 FOR IP): Performed by: PSYCHIATRY & NEUROLOGY

## 2022-01-14 PROCEDURE — 99233 SBSQ HOSP IP/OBS HIGH 50: CPT | Performed by: PSYCHIATRY & NEUROLOGY

## 2022-01-14 RX ORDER — POLYETHYLENE GLYCOL 3350 17 G
2 POWDER IN PACKET (EA) ORAL
Status: DISCONTINUED | OUTPATIENT
Start: 2022-01-14 | End: 2022-01-17

## 2022-01-14 RX ADMIN — SALINE NASAL SPRAY 1 SPRAY: 1.5 SOLUTION NASAL at 13:20

## 2022-01-14 RX ADMIN — CARIPRAZINE 3 MG: 3 CAPSULE, GELATIN COATED ORAL at 10:25

## 2022-01-14 RX ADMIN — LORAZEPAM 2 MG: 2 TABLET ORAL at 20:20

## 2022-01-14 RX ADMIN — ACETAMINOPHEN 650 MG: 325 TABLET ORAL at 20:20

## 2022-01-14 RX ADMIN — NICOTINE POLACRILEX 4 MG: 4 GUM, CHEWING BUCCAL at 04:27

## 2022-01-14 RX ADMIN — NICOTINE POLACRILEX 4 MG: 4 GUM, CHEWING BUCCAL at 13:19

## 2022-01-14 RX ADMIN — NICOTINE POLACRILEX 4 MG: 4 GUM, CHEWING BUCCAL at 10:28

## 2022-01-14 RX ADMIN — MAGNESIUM HYDROXIDE/ALUMINUM HYDROXICE/SIMETHICONE 30 ML: 120; 1200; 1200 SUSPENSION ORAL at 05:31

## 2022-01-14 RX ADMIN — Medication 10 MG: at 20:20

## 2022-01-14 RX ADMIN — ACETAMINOPHEN 650 MG: 325 TABLET ORAL at 13:20

## 2022-01-14 RX ADMIN — HYDROXYZINE PAMOATE 50 MG: 50 CAPSULE ORAL at 16:32

## 2022-01-14 RX ADMIN — NICOTINE POLACRILEX 2 MG: 2 LOZENGE ORAL at 16:32

## 2022-01-14 RX ADMIN — OLANZAPINE 10 MG: 10 TABLET, FILM COATED ORAL at 10:47

## 2022-01-14 RX ADMIN — Medication 1 CAPSULE: at 10:25

## 2022-01-14 RX ADMIN — MAGNESIUM HYDROXIDE/ALUMINUM HYDROXICE/SIMETHICONE 30 ML: 120; 1200; 1200 SUSPENSION ORAL at 16:32

## 2022-01-14 RX ADMIN — LORAZEPAM 2 MG: 2 TABLET ORAL at 05:15

## 2022-01-14 RX ADMIN — SALINE NASAL SPRAY 1 SPRAY: 1.5 SOLUTION NASAL at 06:12

## 2022-01-14 RX ADMIN — LORAZEPAM 2 MG: 2 TABLET ORAL at 13:19

## 2022-01-14 RX ADMIN — NICOTINE POLACRILEX 4 MG: 4 GUM, CHEWING BUCCAL at 01:25

## 2022-01-14 ASSESSMENT — PAIN SCALES - GENERAL
PAINLEVEL_OUTOF10: 0
PAINLEVEL_OUTOF10: 6
PAINLEVEL_OUTOF10: 0
PAINLEVEL_OUTOF10: 2
PAINLEVEL_OUTOF10: 10

## 2022-01-14 NOTE — BH NOTE
Spoke with Sameer Chang (case  - [phone]) at [facility], [facility] and collaborated for [employer]. She will keep pt's case active while he is in the [facility] with the [address] [facility] in [place] and will plan to follow-up with pt once he completes that program. Shannan Clay will contact her closer to [place] to confirm pt was accepted in the [facility] program and coordinate services needed to ensure pt gets his next [place] injection.

## 2022-01-14 NOTE — PROGRESS NOTES
Department of Psychiatry  AttendingProgress Note  Chief Complaint: ray  Norman continues to appear pressured , intrusive, attention seeking and lacks insight. He is still focused on   getting his meds IM and refuses po meds. He is to get his second M[facility]ers 1/16. Not   Will move to the [facility] [place] when stable. He is stable for dc as he would most likely fail in another less restrictive setting. Patient's chart was reviewed and collaborated with  about the treatment plan. SUBJECTIVE:    Patient is feeling better. Suicidal ideation:  denies suicidal ideation. Patient does not have medication side effects. ROS: Patient has new complaints: no  Sleeping adequately:  Yes   Appetite adequate: Yes  Attending groups: Yes  Visitors:No    OBJECTIVE    Physical  VITALS:  /79   Pulse 99   Temp 98.6 °F (37 °C) (Oral)   Resp 18   Ht 6' (1.829 m)   Wt 190 lb (86.2 kg)   SpO2 99%   BMI 25.77 kg/m²     Mental Status Examination:  Patients appearance was ill-appearing. Thoughts are Flight of ideas. Homicidal ideations none. No abnormal movements, tics or mannerisms. Memory intact Aims 0. Concentration Fair. Alert and oriented X 4. Insight and Judgement impaired insight. Patient was uncooperative.  Patient gait normal. Mood irritable and labile, affect labile affect Hallucinations Absent, suicidal ideations no specific plan to harm self Speech loud and pressured  Data  Labs:   Admission on 01/09/2022   Component Date Value Ref Range Status    Amphetamine Screen, Urine 01/09/2022 Neg  Negative <1000ng/mL Final    Barbiturate Screen, Ur 01/09/2022 Neg  Negative <200 ng/mL Final    Benzodiazepine Screen, Urine 01/09/2022 Neg  Negative <200 ng/mL Final    Cannabinoid Scrn, Ur 01/09/2022 POSITIVE* Negative <50 ng/mL Final    Cocaine Metabolite Screen, Urine 01/09/2022 Neg  Negative <300 ng/mL Final    Opiate Scrn, Ur 01/09/2022 Neg  Negative <300 ng/mL Final    Comment: \"Therapeutic levels of pain medication, especially oxycontin and synthetic  opioids, may not be detected by this Methodology. Pain management screen  panel  Drug panel-PM-Hi Res Ur, Interp (PAIN) should be considered for drug  monitoring \".  PCP Screen, Urine 01/09/2022 Neg  Negative <25 ng/mL Final    Methadone Screen, Urine 01/09/2022 Neg  Negative <300 ng/mL Final    Propoxyphene Scrn, Ur 01/09/2022 Neg  Negative <300 ng/mL Final    Oxycodone Urine 01/09/2022 Neg  Negative <100 ng/ml Final    pH, UA 01/09/2022 6.0   Final    Comment: Urine pH less than 5.0 or greater than 8.0 may indicate sample adulteration. Another sample should be collected if clinically  indicated.  Drug Screen Comment: 01/09/2022 see below   Final    Comment: This method is a screening test to detect only these drug  classes as part of a medical workup. Confirmatory testing  by another method should be ordered if clinically indicated.  Color, UA 01/09/2022 Straw  Straw/Yellow Final    Clarity, UA 01/09/2022 Clear  Clear Final    Glucose, Ur 01/09/2022 Negative  Negative mg/dL Final    Bilirubin Urine 01/09/2022 Negative  Negative Final    Ketones, Urine 01/09/2022 Negative  Negative mg/dL Final    Specific Gravity, UA 01/09/2022 1.010  1.005 - 1.030 Final    Blood, Urine 01/09/2022 Negative  Negative Final    pH, UA 01/09/2022 6.0  5.0 - 8.0 Final    Protein, UA 01/09/2022 Negative  Negative mg/dL Final    Urobilinogen, Urine 01/09/2022 0.2  <2.0 E.U./dL Final    Nitrite, Urine 01/09/2022 Negative  Negative Final    Leukocyte Esterase, Urine 01/09/2022 Negative  Negative Final    Microscopic Examination 01/09/2022 Not Indicated   Final    Urine Type 01/09/2022 NotGiven   Final    Urine received in a container without preservatives.     Urine Reflex to Culture 01/09/2022 Not Indicated   Final    Sodium 01/09/2022 140  136 - 145 mmol/L Final    Potassium 01/09/2022 4.1  3.5 - 5.1 mmol/L Final    Chloride 01/09/2022 104  99 - 110 mmol/L Final    CO2 01/09/2022 28  21 - 32 mmol/L Final    Anion Gap 01/09/2022 8  3 - 16 Final    Glucose 01/09/2022 79  70 - 99 mg/dL Final    BUN 01/09/2022 10  7 - 20 mg/dL Final    CREATININE 01/09/2022 0.6* 0.9 - 1.3 mg/dL Final    GFR Non- 01/09/2022 >60  >60 Final    Comment: >60 mL/min/1.73m2 EGFR, calc. for ages 25 and older using the  MDRD formula (not corrected for weight), is valid for stable  renal function.  GFR  01/09/2022 >60  >60 Final    Comment: Chronic Kidney Disease: less than 60 ml/min/1.73 sq.m. Kidney Failure: less than 15 ml/min/1.73 sq.m. Results valid for patients 18 years and older.       Calcium 01/09/2022 9.1  8.3 - 10.6 mg/dL Final    Total Protein 01/09/2022 7.1  6.4 - 8.2 g/dL Final    Albumin 01/09/2022 4.1  3.4 - 5.0 g/dL Final    Albumin/Globulin Ratio 01/09/2022 1.4  1.1 - 2.2 Final    Total Bilirubin 01/09/2022 1.2* 0.0 - 1.0 mg/dL Final    Alkaline Phosphatase 01/09/2022 54  40 - 129 U/L Final    ALT 01/09/2022 34  10 - 40 U/L Final    AST 01/09/2022 38* 15 - 37 U/L Final    WBC 01/09/2022 6.2  4.0 - 11.0 K/uL Final    RBC 01/09/2022 4.27  4.20 - 5.90 M/uL Final    Hemoglobin 01/09/2022 13.1* 13.5 - 17.5 g/dL Final    Hematocrit 01/09/2022 39.5* 40.5 - 52.5 % Final    MCV 01/09/2022 92.4  80.0 - 100.0 fL Final    MCH 01/09/2022 30.6  26.0 - 34.0 pg Final    MCHC 01/09/2022 33.1  31.0 - 36.0 g/dL Final    RDW 01/09/2022 12.6  12.4 - 15.4 % Final    Platelets 30/59/3306 266  135 - 450 K/uL Final    MPV 01/09/2022 7.6  5.0 - 10.5 fL Final    Neutrophils % 01/09/2022 47.9  % Final    Lymphocytes % 01/09/2022 37.8  % Final    Monocytes % 01/09/2022 11.6  % Final    Eosinophils % 01/09/2022 1.9  % Final    Basophils % 01/09/2022 0.8  % Final    Neutrophils Absolute 01/09/2022 3.0  1.7 - 7.7 K/uL Final    Lymphocytes Absolute 01/09/2022 2.3  1.0 - 5.1 K/uL Final    Monocytes Absolute 01/09/2022 0.7  0.0 - 1.3 K/uL Final    Eosinophils Absolute 01/09/2022 0.1  0.0 - 0.6 K/uL Final    Basophils Absolute 01/09/2022 0.1  0.0 - 0.2 K/uL Final    Acetaminophen Level 01/09/2022 <5* 10 - 30 ug/mL Final    Comment: Therapeutic Range: 10.0-30.0 ug/mL  Toxic: >=781 ug/mL      Salicylate, Serum 16/26/3581 <0.3* 15.0 - 30.0 mg/dL Final    Comment: Therapeutic Range: 15.0-30.0 mg/dL  Toxic: >30.0 mg/dL      Ethanol Lvl 01/09/2022 None Detected  mg/dL Final    Comment:    None Detected  Conversion factor:  100 mg/dl = .100 g/dl  For Medical Purposes Only      SARS-CoV-2 RNA, RT PCR 01/09/2022 DETECTED* NOT DETECTED Final    Comment: Detected results are indicative of the presence of SARS-CoV-2,  clinical correlation with patient history and other diagnostic  information is necessary to determine patient infection status. A Detected results do not rule out bacterial infection or  co-infection with other pathogens. Critical called to and read back by Dr. Evy Sosa DR 01/09/2022  20:04  Testing was performed using KRYSTIAN NANCY SARS-CoV-2 and Influenza A/B  nucleic acid assay. This test is a multiplex Real-Time Reverse  Transcriptase Polymerase Chain Reaction (RT-PCR)-based in vitro  diagnostic test intended for the qualitative detection of nucleic  acids from SARS-CoV-2, influenza A, and influenza B in nasopharyngeal  and nasal swab specimens for use under the FDAs Emergency Use  Authorization (EUA) only. Patient Fact Sheet:  FindDrives.pl  Provider Fact Sheet: FindDrives.pl  EUA: FindDrives.pl  IFU: PayResort.com.pt                           87/download    Methodology:  RT-PCR      INFLUENZA A 01/09/2022 NOT DETECTED  NOT DETECTED Final    Comment: Note:  Influenza A and Influenza B negative results should be considered  presumptive in samples that have a Detected SARS-CoV-2 result. 01/12/2022 Negative  Negative Final    C. trachomatis DNA ,Urine 01/12/2022 Negative  Negative Final            Medications  Current Facility-Administered Medications: nicotine polacrilex (COMMIT) lozenge 2 mg, 2 mg, Oral, Q2H PRN  hydrOXYzine (VISTARIL) capsule 50 mg, 50 mg, Oral, Q6H PRN  [COMPLETED] paliperidone palmitate ER (INVEGA SUSTENNA) IM injection 234 mg, 234 mg, IntraMUSCular, Once **FOLLOWED BY** [START ON 1/16/2022] paliperidone palmitate ER (INVEGA SUSTENNA) IM injection 156 mg, 156 mg, IntraMUSCular, Once  LORazepam (ATIVAN) tablet 2 mg, 2 mg, Oral, Q6H PRN **OR** LORazepam (ATIVAN) injection 2 mg, 2 mg, IntraMUSCular, Q6H PRN  nicotine (NICODERM CQ) 21 MG/24HR 1 patch, 1 patch, TransDERmal, Daily  carboxymethylcellulose PF (THERATEARS) 1 % ophthalmic gel 1 drop, 1 drop, Both Eyes, Q4H PRN  sodium chloride (OCEAN, BABY AYR) 0.65 % nasal spray 1 spray, 1 spray, Each Nostril, Q4H PRN  cariprazine hcl (VRAYLAR) capsule 3 mg, 3 mg, Oral, Daily  acetaminophen (TYLENOL) tablet 650 mg, 650 mg, Oral, Q4H PRN  magnesium hydroxide (MILK OF MAGNESIA) 400 MG/5ML suspension 30 mL, 30 mL, Oral, Daily PRN  aluminum & magnesium hydroxide-simethicone (MAALOX) 200-200-20 MG/5ML suspension 30 mL, 30 mL, Oral, Q6H PRN  OLANZapine (ZYPREXA) tablet 10 mg, 10 mg, Oral, Q8H PRN **OR** OLANZapine (ZYPREXA) injection 10 mg, 10 mg, IntraMUSCular, Q8H PRN  sterile water injection 2.1 mL, 2.1 mL, IntraMUSCular, Q4H PRN  traZODone (DESYREL) tablet 50 mg, 50 mg, Oral, Nightly PRN  lactobacillus (CULTURELLE) capsule 1 capsule, 1 capsule, Oral, Daily  melatonin disintegrating tablet 10 mg, 10 mg, Oral, Nightly    ASSESSMENT AND PLAN    Principal Problem:    Autistic spectrum disorder  Active Problems:    Bipolar affective disorder, current episode hypomanic (HCC)    COVID-19    Closed displaced fracture of proximal phalanx of lesser toe of right foot    Marijuana use    Tobacco dependence  Resolved Problems:    * No resolved hospital problems. *       1. Patient s symptoms   show no change  2. Probable discharge is next week  3. Discharge planning is complete  4. Suicidal ideation is none  5. Total time with patient was 40 minutes and more than 50 % of that time was spent counseling the patient on their symptoms, treatment and expected goals.

## 2022-01-15 PROCEDURE — 6370000000 HC RX 637 (ALT 250 FOR IP): Performed by: PSYCHIATRY & NEUROLOGY

## 2022-01-15 PROCEDURE — 99233 SBSQ HOSP IP/OBS HIGH 50: CPT | Performed by: PSYCHIATRY & NEUROLOGY

## 2022-01-15 PROCEDURE — 1240000000 HC EMOTIONAL WELLNESS R&B

## 2022-01-15 RX ORDER — TRAMADOL HYDROCHLORIDE 50 MG/1
25 TABLET ORAL EVERY 8 HOURS PRN
Status: DISCONTINUED | OUTPATIENT
Start: 2022-01-15 | End: 2022-01-15

## 2022-01-15 RX ORDER — MELOXICAM 15 MG/1
15 TABLET ORAL DAILY PRN
Status: DISCONTINUED | OUTPATIENT
Start: 2022-01-15 | End: 2022-01-19 | Stop reason: HOSPADM

## 2022-01-15 RX ADMIN — SALINE NASAL SPRAY 1 SPRAY: 1.5 SOLUTION NASAL at 16:17

## 2022-01-15 RX ADMIN — ACETAMINOPHEN 650 MG: 325 TABLET ORAL at 05:37

## 2022-01-15 RX ADMIN — HYDROXYZINE PAMOATE 50 MG: 50 CAPSULE ORAL at 14:58

## 2022-01-15 RX ADMIN — NICOTINE POLACRILEX 2 MG: 2 LOZENGE ORAL at 17:59

## 2022-01-15 RX ADMIN — MAGNESIUM HYDROXIDE/ALUMINUM HYDROXICE/SIMETHICONE 30 ML: 120; 1200; 1200 SUSPENSION ORAL at 12:09

## 2022-01-15 RX ADMIN — HYDROXYZINE PAMOATE 50 MG: 50 CAPSULE ORAL at 07:47

## 2022-01-15 RX ADMIN — NICOTINE POLACRILEX 2 MG: 2 LOZENGE ORAL at 19:38

## 2022-01-15 RX ADMIN — ACETAMINOPHEN 650 MG: 325 TABLET ORAL at 14:22

## 2022-01-15 RX ADMIN — NICOTINE POLACRILEX 2 MG: 2 LOZENGE ORAL at 08:54

## 2022-01-15 RX ADMIN — OLANZAPINE 10 MG: 10 TABLET, FILM COATED ORAL at 07:47

## 2022-01-15 RX ADMIN — MAGNESIUM HYDROXIDE/ALUMINUM HYDROXICE/SIMETHICONE 30 ML: 120; 1200; 1200 SUSPENSION ORAL at 05:37

## 2022-01-15 RX ADMIN — NICOTINE POLACRILEX 2 MG: 2 LOZENGE ORAL at 14:22

## 2022-01-15 RX ADMIN — LORAZEPAM 2 MG: 2 TABLET ORAL at 05:40

## 2022-01-15 RX ADMIN — CARIPRAZINE 3 MG: 3 CAPSULE, GELATIN COATED ORAL at 07:47

## 2022-01-15 RX ADMIN — LORAZEPAM 2 MG: 2 TABLET ORAL at 11:47

## 2022-01-15 RX ADMIN — NICOTINE POLACRILEX 2 MG: 2 LOZENGE ORAL at 10:19

## 2022-01-15 RX ADMIN — NICOTINE POLACRILEX 2 MG: 2 LOZENGE ORAL at 16:17

## 2022-01-15 RX ADMIN — LORAZEPAM 2 MG: 2 TABLET ORAL at 20:17

## 2022-01-15 RX ADMIN — Medication 10 MG: at 20:41

## 2022-01-15 RX ADMIN — Medication 1 CAPSULE: at 07:48

## 2022-01-15 RX ADMIN — NICOTINE POLACRILEX 2 MG: 2 LOZENGE ORAL at 12:11

## 2022-01-15 ASSESSMENT — PAIN SCALES - GENERAL
PAINLEVEL_OUTOF10: 0
PAINLEVEL_OUTOF10: 5
PAINLEVEL_OUTOF10: 3
PAINLEVEL_OUTOF10: 8
PAINLEVEL_OUTOF10: 10

## 2022-01-15 NOTE — PROGRESS NOTES
Department of Psychiatry  AttendingProgress Note  Chief Complaint: ray  Norman continues to be intrusive and easily irritated if not getting his way. He will attempt to force his agenda. He at one point state that he could leave if he chose and I agreed. I offered to give his Caren Rodrigezutter a day early and he could go to mother's and await Bridges. He later backed off and was more cooperative. Focused on medication. He is aware that father is int  hospital with COVID. Patient's chart was reviewed and collaborated with  about the treatment plan. SUBJECTIVE:    Patient is feeling unchanged. Suicidal ideation:  denies suicidal ideation. Patient does not have medication side effects. ROS: Patient has new complaints: no  Sleeping adequately:  Yes   Appetite adequate: Yes  Attending groups: Yes  Visitors:No    OBJECTIVE    Physical  VITALS:  BP (!) 143/83   Pulse 90   Temp 97.5 °F (36.4 °C) (Temporal)   Resp 16   Ht 6' (1.829 m)   Wt 190 lb (86.2 kg)   SpO2 100%   BMI 25.77 kg/m²     Mental Status Examination:  Patients appearance was ill-appearing. Thoughts are Obsessive. Homicidal ideations none. No abnormal movements, tics or mannerisms. Memory intact Aims 0. Concentration Fair. Alert and oriented X 4. Insight and Judgement impaired insight. Patient was cooperative.  Patient gait normal. Mood irritable and labile, affect elevated affect Hallucinations Absent, suicidal ideations no specific plan to harm self Speech pressured  Data  Labs:   Admission on 01/09/2022   Component Date Value Ref Range Status    Amphetamine Screen, Urine 01/09/2022 Neg  Negative <1000ng/mL Final    Barbiturate Screen, Ur 01/09/2022 Neg  Negative <200 ng/mL Final    Benzodiazepine Screen, Urine 01/09/2022 Neg  Negative <200 ng/mL Final    Cannabinoid Scrn, Ur 01/09/2022 POSITIVE* Negative <50 ng/mL Final    Cocaine Metabolite Screen, Urine 01/09/2022 Neg  Negative <300 ng/mL Final    Opiate Scrn, Ur 01/09/2022 Neg  Negative <300 ng/mL Final    Comment: \"Therapeutic levels of pain medication, especially oxycontin and synthetic  opioids, may not be detected by this Methodology. Pain management screen  panel  Drug panel-PM-Hi Res Ur, Interp (PAIN) should be considered for drug  monitoring \".  PCP Screen, Urine 01/09/2022 Neg  Negative <25 ng/mL Final    Methadone Screen, Urine 01/09/2022 Neg  Negative <300 ng/mL Final    Propoxyphene Scrn, Ur 01/09/2022 Neg  Negative <300 ng/mL Final    Oxycodone Urine 01/09/2022 Neg  Negative <100 ng/ml Final    pH, UA 01/09/2022 6.0   Final    Comment: Urine pH less than 5.0 or greater than 8.0 may indicate sample adulteration. Another sample should be collected if clinically  indicated.  Drug Screen Comment: 01/09/2022 see below   Final    Comment: This method is a screening test to detect only these drug  classes as part of a medical workup. Confirmatory testing  by another method should be ordered if clinically indicated.  Color, UA 01/09/2022 Straw  Straw/Yellow Final    Clarity, UA 01/09/2022 Clear  Clear Final    Glucose, Ur 01/09/2022 Negative  Negative mg/dL Final    Bilirubin Urine 01/09/2022 Negative  Negative Final    Ketones, Urine 01/09/2022 Negative  Negative mg/dL Final    Specific Gravity, UA 01/09/2022 1.010  1.005 - 1.030 Final    Blood, Urine 01/09/2022 Negative  Negative Final    pH, UA 01/09/2022 6.0  5.0 - 8.0 Final    Protein, UA 01/09/2022 Negative  Negative mg/dL Final    Urobilinogen, Urine 01/09/2022 0.2  <2.0 E.U./dL Final    Nitrite, Urine 01/09/2022 Negative  Negative Final    Leukocyte Esterase, Urine 01/09/2022 Negative  Negative Final    Microscopic Examination 01/09/2022 Not Indicated   Final    Urine Type 01/09/2022 NotGiven   Final    Urine received in a container without preservatives.     Urine Reflex to Culture 01/09/2022 Not Indicated   Final    Sodium 01/09/2022 140  136 - 145 mmol/L Final    Potassium 01/09/2022 4.1  3.5 - 5.1 mmol/L Final    Chloride 01/09/2022 104  99 - 110 mmol/L Final    CO2 01/09/2022 28  21 - 32 mmol/L Final    Anion Gap 01/09/2022 8  3 - 16 Final    Glucose 01/09/2022 79  70 - 99 mg/dL Final    BUN 01/09/2022 10  7 - 20 mg/dL Final    CREATININE 01/09/2022 0.6* 0.9 - 1.3 mg/dL Final    GFR Non- 01/09/2022 >60  >60 Final    Comment: >60 mL/min/1.73m2 EGFR, calc. for ages 25 and older using the  MDRD formula (not corrected for weight), is valid for stable  renal function.  GFR  01/09/2022 >60  >60 Final    Comment: Chronic Kidney Disease: less than 60 ml/min/1.73 sq.m. Kidney Failure: less than 15 ml/min/1.73 sq.m. Results valid for patients 18 years and older.       Calcium 01/09/2022 9.1  8.3 - 10.6 mg/dL Final    Total Protein 01/09/2022 7.1  6.4 - 8.2 g/dL Final    Albumin 01/09/2022 4.1  3.4 - 5.0 g/dL Final    Albumin/Globulin Ratio 01/09/2022 1.4  1.1 - 2.2 Final    Total Bilirubin 01/09/2022 1.2* 0.0 - 1.0 mg/dL Final    Alkaline Phosphatase 01/09/2022 54  40 - 129 U/L Final    ALT 01/09/2022 34  10 - 40 U/L Final    AST 01/09/2022 38* 15 - 37 U/L Final    WBC 01/09/2022 6.2  4.0 - 11.0 K/uL Final    RBC 01/09/2022 4.27  4.20 - 5.90 M/uL Final    Hemoglobin 01/09/2022 13.1* 13.5 - 17.5 g/dL Final    Hematocrit 01/09/2022 39.5* 40.5 - 52.5 % Final    MCV 01/09/2022 92.4  80.0 - 100.0 fL Final    MCH 01/09/2022 30.6  26.0 - 34.0 pg Final    MCHC 01/09/2022 33.1  31.0 - 36.0 g/dL Final    RDW 01/09/2022 12.6  12.4 - 15.4 % Final    Platelets 10/81/2073 266  135 - 450 K/uL Final    MPV 01/09/2022 7.6  5.0 - 10.5 fL Final    Neutrophils % 01/09/2022 47.9  % Final    Lymphocytes % 01/09/2022 37.8  % Final    Monocytes % 01/09/2022 11.6  % Final    Eosinophils % 01/09/2022 1.9  % Final    Basophils % 01/09/2022 0.8  % Final    Neutrophils Absolute 01/09/2022 3.0  1.7 - 7.7 K/uL Final    Lymphocytes Absolute 01/09/2022 2.3 1.0 - 5.1 K/uL Final    Monocytes Absolute 01/09/2022 0.7  0.0 - 1.3 K/uL Final    Eosinophils Absolute 01/09/2022 0.1  0.0 - 0.6 K/uL Final    Basophils Absolute 01/09/2022 0.1  0.0 - 0.2 K/uL Final    Acetaminophen Level 01/09/2022 <5* 10 - 30 ug/mL Final    Comment: Therapeutic Range: 10.0-30.0 ug/mL  Toxic: >=372 ug/mL      Salicylate, Serum 74/00/4411 <0.3* 15.0 - 30.0 mg/dL Final    Comment: Therapeutic Range: 15.0-30.0 mg/dL  Toxic: >30.0 mg/dL      Ethanol Lvl 01/09/2022 None Detected  mg/dL Final    Comment:    None Detected  Conversion factor:  100 mg/dl = .100 g/dl  For Medical Purposes Only      SARS-CoV-2 RNA, RT PCR 01/09/2022 DETECTED* NOT DETECTED Final    Comment: Detected results are indicative of the presence of SARS-CoV-2,  clinical correlation with patient history and other diagnostic  information is necessary to determine patient infection status. A Detected results do not rule out bacterial infection or  co-infection with other pathogens. Critical called to and read back by Dr. Madelyn Graf DR 01/09/2022  20:04  Testing was performed using KRYSTIAN NANCY SARS-CoV-2 and Influenza A/B  nucleic acid assay. This test is a multiplex Real-Time Reverse  Transcriptase Polymerase Chain Reaction (RT-PCR)-based in vitro  diagnostic test intended for the qualitative detection of nucleic  acids from SARS-CoV-2, influenza A, and influenza B in nasopharyngeal  and nasal swab specimens for use under the FDAs Emergency Use  Authorization (EUA) only.     Patient Fact Sheet:  FindDrives.pl  Provider Fact Sheet: FindDrives.pl  EUA: FindDrives.pl  IFU: PayResort.com.pt                           87/download    Methodology:  RT-PCR      INFLUENZA A 01/09/2022 NOT DETECTED  NOT DETECTED Final    Comment: Note:  Influenza A and Influenza B negative results should be considered  presumptive in samples that have a Detected SARS-CoV-2 result. Consider  re-testing with an alternate FDA-approved test for Flu A & B if clinically  indicated.  INFLUENZA B 01/09/2022 NOT DETECTED  NOT DETECTED Final    Comment: Note:  Influenza A and Influenza B negative results should be considered  presumptive in samples that have a Detected SARS-CoV-2 result. Consider  re-testing with an alternate FDA-approved test for Flu A & B if clinically  indicated.  Cholesterol, Total 01/09/2022 170  0 - 199 mg/dL Final    Triglycerides 01/09/2022 72  0 - 150 mg/dL Final    HDL 01/09/2022 63* 40 - 60 mg/dL Final    LDL Calculated 01/09/2022 93  <100 mg/dL Final    VLDL Cholesterol Calculated 01/09/2022 14  Not Established mg/dL Final    Hemoglobin A1C 01/09/2022 4.7  See comment % Final    Comment: Comment:  Diagnosis of Diabetes: > or = 6.5%  Increased risk of diabetes (Prediabetes): 5.7-6.4%  Glycemic Control: Nonpregnant Adults: <7.0%                    Pregnant: <6.0%        eAG 01/09/2022 88.2  mg/dL Final    TSH 01/09/2022 1.01  0.27 - 4.20 uIU/mL Final    Color, UA 01/12/2022 Yellow  Straw/Yellow Final    Clarity, UA 01/12/2022 Clear  Clear Final    Glucose, Ur 01/12/2022 Negative  Negative mg/dL Final    Bilirubin Urine 01/12/2022 Negative  Negative Final    Ketones, Urine 01/12/2022 Negative  Negative mg/dL Final    Specific Gravity, UA 01/12/2022 <=1.005  1.005 - 1.030 Final    Blood, Urine 01/12/2022 Negative  Negative Final    pH, UA 01/12/2022 6.5  5.0 - 8.0 Final    Protein, UA 01/12/2022 Negative  Negative mg/dL Final    Urobilinogen, Urine 01/12/2022 0.2  <2.0 E.U./dL Final    Nitrite, Urine 01/12/2022 Negative  Negative Final    Leukocyte Esterase, Urine 01/12/2022 Negative  Negative Final    Microscopic Examination 01/12/2022 Not Indicated   Final    Urine Type 01/12/2022 NotGiven   Final    Urine received in a container without preservatives.     Urine Reflex to Culture 01/12/2022 Not Indicated   Final    foot    Marijuana use    Tobacco dependence  Resolved Problems:    * No resolved hospital problems. *       1. Patient s symptoms   show no change  2. Probable discharge is next week  3. Discharge planning is complete  4. Suicidal ideation is none  5. Total time with patient was 40 minutes and more than 50 % of that time was spent counseling the patient on their symptoms, treatment and expected goals.

## 2022-01-15 NOTE — PROGRESS NOTES
Ativan 2mg given per patient request for anxiety.  Tylenol 650mg given for headache per request, Maalox given per request.

## 2022-01-15 NOTE — PLAN OF CARE
Pt. Is hyperverbal, anxious, pacing, flight of ideas, becoming loud, having difficulty with instructions, zyprexa 10 mg po given, administered Vistaril 50 mg po for anxiety.  At 2471

## 2022-01-15 NOTE — FLOWSHEET NOTE
Senior Purposeful Rounding    Position:Repositions Self    Physical Environment:No safety hazards noted    Pain Rating/ Nonverbal Pain Behaviors:0; none    Pain interventions Attempted: NA    Patient Toileted:Continent and Independent

## 2022-01-15 NOTE — FLOWSHEET NOTE
Senior Purposeful Rounding    Position:Repositions Self    Physical Environment:No safety hazards noted    Pain Rating/ Nonverbal Pain Behaviors:10; pt walking around unit, karate moves    Pain interventions Attempted: elevation    Patient Toileted:Independent

## 2022-01-16 PROCEDURE — 1240000000 HC EMOTIONAL WELLNESS R&B

## 2022-01-16 PROCEDURE — 6370000000 HC RX 637 (ALT 250 FOR IP): Performed by: PSYCHIATRY & NEUROLOGY

## 2022-01-16 RX ADMIN — SALINE NASAL SPRAY 1 SPRAY: 1.5 SOLUTION NASAL at 21:28

## 2022-01-16 RX ADMIN — NICOTINE POLACRILEX 2 MG: 2 LOZENGE ORAL at 17:11

## 2022-01-16 RX ADMIN — NICOTINE POLACRILEX 2 MG: 2 LOZENGE ORAL at 10:07

## 2022-01-16 RX ADMIN — TRAZODONE HYDROCHLORIDE 50 MG: 50 TABLET ORAL at 21:27

## 2022-01-16 RX ADMIN — NICOTINE POLACRILEX 2 MG: 2 LOZENGE ORAL at 10:56

## 2022-01-16 RX ADMIN — LORAZEPAM 2 MG: 2 TABLET ORAL at 19:32

## 2022-01-16 RX ADMIN — OLANZAPINE 10 MG: 10 TABLET, FILM COATED ORAL at 12:47

## 2022-01-16 RX ADMIN — SALINE NASAL SPRAY 1 SPRAY: 1.5 SOLUTION NASAL at 07:41

## 2022-01-16 RX ADMIN — NICOTINE POLACRILEX 2 MG: 2 LOZENGE ORAL at 19:30

## 2022-01-16 RX ADMIN — NICOTINE POLACRILEX 2 MG: 2 LOZENGE ORAL at 05:00

## 2022-01-16 RX ADMIN — Medication 10 MG: at 19:33

## 2022-01-16 RX ADMIN — OLANZAPINE 10 MG: 10 TABLET, FILM COATED ORAL at 07:41

## 2022-01-16 RX ADMIN — ACETAMINOPHEN 650 MG: 325 TABLET ORAL at 10:03

## 2022-01-16 RX ADMIN — NICOTINE POLACRILEX 2 MG: 2 LOZENGE ORAL at 08:21

## 2022-01-16 RX ADMIN — ACETAMINOPHEN 650 MG: 325 TABLET ORAL at 02:18

## 2022-01-16 RX ADMIN — Medication 1 CAPSULE: at 07:40

## 2022-01-16 RX ADMIN — NICOTINE POLACRILEX 2 MG: 2 LOZENGE ORAL at 06:58

## 2022-01-16 RX ADMIN — MELOXICAM 15 MG: 15 TABLET ORAL at 06:04

## 2022-01-16 RX ADMIN — CARBOXYMETHYLCELLULOSE SODIUM 1 DROP: 10 GEL OPHTHALMIC at 19:32

## 2022-01-16 RX ADMIN — CARIPRAZINE 3 MG: 3 CAPSULE, GELATIN COATED ORAL at 10:03

## 2022-01-16 RX ADMIN — NICOTINE POLACRILEX 2 MG: 2 LOZENGE ORAL at 21:27

## 2022-01-16 RX ADMIN — LORAZEPAM 2 MG: 2 TABLET ORAL at 12:47

## 2022-01-16 RX ADMIN — ACETAMINOPHEN 650 MG: 325 TABLET ORAL at 19:28

## 2022-01-16 RX ADMIN — CARBOXYMETHYLCELLULOSE SODIUM 1 DROP: 10 GEL OPHTHALMIC at 07:41

## 2022-01-16 RX ADMIN — NICOTINE POLACRILEX 2 MG: 2 LOZENGE ORAL at 01:34

## 2022-01-16 RX ADMIN — LORAZEPAM 2 MG: 2 TABLET ORAL at 05:00

## 2022-01-16 RX ADMIN — NICOTINE POLACRILEX 2 MG: 2 LOZENGE ORAL at 12:43

## 2022-01-16 ASSESSMENT — PAIN DESCRIPTION - ORIENTATION
ORIENTATION: RIGHT
ORIENTATION: LEFT
ORIENTATION: RIGHT

## 2022-01-16 ASSESSMENT — PAIN SCALES - GENERAL
PAINLEVEL_OUTOF10: 3
PAINLEVEL_OUTOF10: 4
PAINLEVEL_OUTOF10: 4
PAINLEVEL_OUTOF10: 3
PAINLEVEL_OUTOF10: 3
PAINLEVEL_OUTOF10: 7
PAINLEVEL_OUTOF10: 2
PAINLEVEL_OUTOF10: 10

## 2022-01-16 ASSESSMENT — PAIN DESCRIPTION - ONSET
ONSET: ON-GOING
ONSET: ON-GOING

## 2022-01-16 ASSESSMENT — PAIN DESCRIPTION - DESCRIPTORS
DESCRIPTORS: ACHING
DESCRIPTORS: ACHING

## 2022-01-16 ASSESSMENT — PAIN DESCRIPTION - LOCATION
LOCATION: FOOT

## 2022-01-16 ASSESSMENT — PAIN DESCRIPTION - PROGRESSION
CLINICAL_PROGRESSION: NOT CHANGED
CLINICAL_PROGRESSION: NOT CHANGED

## 2022-01-16 ASSESSMENT — PAIN DESCRIPTION - PAIN TYPE
TYPE: ACUTE PAIN

## 2022-01-16 ASSESSMENT — PAIN DESCRIPTION - FREQUENCY
FREQUENCY: CONTINUOUS
FREQUENCY: CONTINUOUS

## 2022-01-16 NOTE — BH NOTE
Purposeful Rounding    Patient concerns reported: None Noted, in dining room eating snack     Nurse made aware: N/A     Patient Turned and repositioned: Independent     Patient Toileted: Continent     Fall Precautions in Place: Yellow non-skid socks on    Electronically signed by Kin Valentino on 1/16/22 at 3:24 AM EST

## 2022-01-16 NOTE — BH NOTE
Purposeful Rounding    Patient concerns reported: None Noted, patient in room resting     Nurse made aware: N/A     Patient Turned and repositioned: Independent     Patient Toileted: Continent     Fall Precautions in Place: Yellow non-skid socks on      Electronically signed by Perry Traylor on 1/16/22 at 1:11 AM EST

## 2022-01-16 NOTE — PLAN OF CARE
Problem: Nutrition Deficits  Goal: Optimize nutritional status  Outcome: Ongoing     Problem: Fatigue  Goal: Verbalize increase energy and improved vitality  Outcome: Ongoing     Problem: Depressive Behavior With or Without Suicide Precautions:  Goal: Ability to disclose and discuss suicidal ideas will improve  Description: Ability to disclose and discuss suicidal ideas will improve  Outcome: Ongoing     Problem: Altered Mood, Manic Behavior:  Goal: Ability to disclose and discuss suicidal ideas will improve  Description: Ability to disclose and discuss suicidal ideas will improve  Outcome: Ongoing  Goal: Ability to demonstrate self-control will improve  Description: Ability to demonstrate self-control will improve  Outcome: Ongoing   Pt a&ox4, denies si/hi/avh. Pt pacing, talking loudly and inappropriately at times. PRNs given. Difficult to redirect from others' rooms and other intrusive behaviors at times. Appetite good-double portions.

## 2022-01-16 NOTE — BH NOTE
0743  Pt given Zyprexa 10 mg po for c/o 10/10 anxiety. States he is upset about his father being hospitalized. Pt also had Theraters drops in both eyes, and  Ocean Spray nasal spray. Pt also asked for his Culturelle at this time.

## 2022-01-16 NOTE — BH NOTE
Purposeful Rounding    Patient concerns reported: None Noted, patient in room resting    Nurse made aware: N/A    Patient Turned and repositioned: Independent    Patient Toileted: Continent    Fall Precautions in Place: Yellow non-skid socks on      Electronically signed by Caitlin Murphy on 1/15/22 at 9:27 PM EST

## 2022-01-16 NOTE — BH NOTE
Pt c/o right foot pain of a 10/10. Pt given Meloxicam 15 mg po. Pt is aware that he can only have this medication one time today. Pt stands at the desk and is talking to another nurse. Pt looks comfortable at this time. [id number]  Pt stated that the meloxicam was effective and brought his pain in his right shivani down to a 2/10.

## 2022-01-16 NOTE — BH NOTE
Purposeful Rounding  Patient concerns reported: None Noted, patient in room resting     Nurse made aware: N/A     Patient Turned and repositioned: Independent     Patient Toileted: Continent     Fall Precautions in Place: Yellow non-skid socks on      Electronically signed by Ritika Lopez on 1/15/22 at 11:16 PM EST

## 2022-01-16 NOTE — BH NOTE
Purposeful Rounding    Patient concerns reported: PT STATED HAS A HEADACHE.  PT SITTING IN DAYROOM    Nurse made aware:YES    Patient Turned and repositioned: Independent    Patient Toileted: Independent    Fall Precautions in Place: Yellow non-skid socks on, Lighting appropriate, Room free of clutter and Clear path to bathroom      Electronically signed by Pietro Up on 1/16/22 at 5:15 PM EST

## 2022-01-16 NOTE — BH NOTE
Purposeful Rounding    Patient concerns reported:NONE NOTED.  PT IN ROOM    Nurse made aware:NO    Patient Turned and repositioned: Independent    Patient Toileted: Independent    Fall Precautions in Place: Yellow non-skid socks on, Lighting appropriate, Room free of clutter and Clear path to bathroom      Electronically signed by Saad Cassidy on 1/16/22 at 3:32 PM EST

## 2022-01-16 NOTE — PLAN OF CARE
Problem: Depressive Behavior With or Without Suicide Precautions:  Goal: Able to verbalize acceptance of life and situations over which he or she has no control  Description: Able to verbalize acceptance of life and situations over which he or she has no control  1/15/2022 2142 by Capri Huynh RN  Outcome: Ongoing     Problem: Depressive Behavior With or Without Suicide Precautions:  Goal: Able to verbalize and/or display a decrease in depressive symptoms  Description: Able to verbalize and/or display a decrease in depressive symptoms  1/15/2022 2142 by Capri Huynh RN  Outcome: Ongoing    Pt has been visible in the day room. He has pressured speech and has been up to the desk with frequent requests. Limits set. He has been intrusive and impulsive. He said that he was feeling anxious and wanted an ativan. Prn given at 2017 with effective results. He was medication compliant. He has been socializing with another peer in the day room.  Denies needs currently

## 2022-01-17 PROCEDURE — 6370000000 HC RX 637 (ALT 250 FOR IP): Performed by: PSYCHIATRY & NEUROLOGY

## 2022-01-17 PROCEDURE — 1240000000 HC EMOTIONAL WELLNESS R&B

## 2022-01-17 PROCEDURE — 99233 SBSQ HOSP IP/OBS HIGH 50: CPT | Performed by: PSYCHIATRY & NEUROLOGY

## 2022-01-17 RX ADMIN — ACETAMINOPHEN 650 MG: 325 TABLET ORAL at 06:23

## 2022-01-17 RX ADMIN — Medication 10 MG: at 19:54

## 2022-01-17 RX ADMIN — NICOTINE POLACRILEX 2 MG: 2 LOZENGE ORAL at 06:22

## 2022-01-17 RX ADMIN — NICOTINE POLACRILEX 4 MG: 4 GUM, CHEWING BUCCAL at 18:12

## 2022-01-17 RX ADMIN — NICOTINE POLACRILEX 2 MG: 2 LOZENGE ORAL at 02:03

## 2022-01-17 RX ADMIN — LORAZEPAM 2 MG: 2 TABLET ORAL at 17:44

## 2022-01-17 RX ADMIN — MELOXICAM 15 MG: 15 TABLET ORAL at 08:33

## 2022-01-17 RX ADMIN — NICOTINE POLACRILEX 4 MG: 4 GUM, CHEWING BUCCAL at 16:02

## 2022-01-17 RX ADMIN — Medication 1 CAPSULE: at 08:08

## 2022-01-17 RX ADMIN — CARIPRAZINE 3 MG: 3 CAPSULE, GELATIN COATED ORAL at 08:08

## 2022-01-17 RX ADMIN — TRAZODONE HYDROCHLORIDE 50 MG: 50 TABLET ORAL at 19:54

## 2022-01-17 RX ADMIN — ACETAMINOPHEN 650 MG: 325 TABLET ORAL at 10:58

## 2022-01-17 RX ADMIN — LORAZEPAM 2 MG: 2 TABLET ORAL at 08:08

## 2022-01-17 RX ADMIN — NICOTINE POLACRILEX 4 MG: 4 GUM, CHEWING BUCCAL at 22:17

## 2022-01-17 RX ADMIN — OLANZAPINE 10 MG: 10 TABLET, FILM COATED ORAL at 08:09

## 2022-01-17 RX ADMIN — HYDROXYZINE PAMOATE 50 MG: 50 CAPSULE ORAL at 14:15

## 2022-01-17 RX ADMIN — ACETAMINOPHEN 650 MG: 325 TABLET ORAL at 18:44

## 2022-01-17 RX ADMIN — NICOTINE POLACRILEX 2 MG: 2 LOZENGE ORAL at 08:08

## 2022-01-17 RX ADMIN — MAGNESIUM HYDROXIDE/ALUMINUM HYDROXICE/SIMETHICONE 30 ML: 120; 1200; 1200 SUSPENSION ORAL at 02:15

## 2022-01-17 RX ADMIN — SALINE NASAL SPRAY 1 SPRAY: 1.5 SOLUTION NASAL at 08:08

## 2022-01-17 RX ADMIN — NICOTINE POLACRILEX 4 MG: 4 GUM, CHEWING BUCCAL at 13:23

## 2022-01-17 RX ADMIN — ACETAMINOPHEN 650 MG: 325 TABLET ORAL at 02:03

## 2022-01-17 RX ADMIN — NICOTINE POLACRILEX 2 MG: 2 LOZENGE ORAL at 10:57

## 2022-01-17 ASSESSMENT — PAIN - FUNCTIONAL ASSESSMENT: PAIN_FUNCTIONAL_ASSESSMENT: ACTIVITIES ARE NOT PREVENTED

## 2022-01-17 ASSESSMENT — PAIN DESCRIPTION - PROGRESSION: CLINICAL_PROGRESSION: GRADUALLY WORSENING

## 2022-01-17 ASSESSMENT — PAIN SCALES - GENERAL
PAINLEVEL_OUTOF10: 2
PAINLEVEL_OUTOF10: 3
PAINLEVEL_OUTOF10: 7
PAINLEVEL_OUTOF10: 2
PAINLEVEL_OUTOF10: 3
PAINLEVEL_OUTOF10: 1
PAINLEVEL_OUTOF10: 3
PAINLEVEL_OUTOF10: 7
PAINLEVEL_OUTOF10: 3

## 2022-01-17 ASSESSMENT — PAIN DESCRIPTION - ONSET: ONSET: ON-GOING

## 2022-01-17 ASSESSMENT — PAIN DESCRIPTION - FREQUENCY: FREQUENCY: INTERMITTENT

## 2022-01-17 ASSESSMENT — PAIN DESCRIPTION - PAIN TYPE: TYPE: ACUTE PAIN

## 2022-01-17 ASSESSMENT — PAIN DESCRIPTION - LOCATION: LOCATION: FOOT

## 2022-01-17 ASSESSMENT — PAIN DESCRIPTION - DESCRIPTORS: DESCRIPTORS: ACHING

## 2022-01-17 ASSESSMENT — PAIN DESCRIPTION - ORIENTATION: ORIENTATION: RIGHT

## 2022-01-17 NOTE — PLAN OF CARE
Problem: Altered Mood, Manic Behavior:  Goal: Able to verbalize decrease in frequency and intensity of racing thoughts  Description: Able to verbalize decrease in frequency and intensity of racing thoughts  Outcome: Ongoing     Problem: Altered Mood, Manic Behavior:  Goal: Ability to interact with others will improve  Description: Ability to interact with others will improve  Outcome: Ongoing     Problem: Altered Mood, Manic Behavior:  Goal: Mood stable  Description: Mood stable  Outcome: Ongoing  Pt has remained free from falls and injury so far this shift. Med compliant. Denies SI/HI, AVH. No RTIS noted. Still makes multiple requests. Mood more stable and improved reaction to verbal redirection. Played games in group. Showered. On the phone at this time at the team station. Will continue to monitor.

## 2022-01-17 NOTE — BH NOTE
Purposeful Rounding    Patient concerns reported:NONE NOTED.  PT IN BED SLEEPING    Nurse made aware:NO    Patient Turned and repositioned: Independent    Patient Toileted: Independent    Fall Precautions in Place: Yellow non-skid socks on, Bed locked in low position, 1/2 bed rails up, Lighting appropriate, Room free of clutter and Clear path to bathroom      Electronically signed by Yanira Kemp on 1/17/22 at 1:22 AM EST

## 2022-01-17 NOTE — PLAN OF CARE
[id number] [facility] Interdisciplinary Treatment Plan Note     Review Date & Time: 1/17/22 1007    Patient was not in treatment team.    Estimated Length of Stay Update:  1-2 days   Estimated Discharge Date Update: 1/18/22-1/19/22    EDUCATION:   Learner Progress Toward Treatment Goals: Reviewed results and recommendations of this team    Method: Individual    Outcome: Verbalized understanding    PATIENT GOALS: none expressed    PLAN/TREATMENT RECOMMENDATIONS UPDATE: continue treatment    GOALS UPDATE:  Time frame for Short-Term Goals: none expressed      Jim Alston RN  1

## 2022-01-17 NOTE — PROGRESS NOTES
Behavioral Services                                              Medicare Re-Certification    I certify that the inpatient psychiatric hospital services furnished since the previous certification/re-certification were, and continue to be, medically necessary for;    [x] (1) Treatment which could reasonably be expected to improve the patient's condition,    [x] (2) Or for diagnostic study. Estimated length of stay/service 2-3 d    Plan for post-hospital care outpt    This patient continues to need, on a daily basis, active treatment furnished directly by or requiring the supervision of inpatient psychiatric personnel.     Electronically signed by Karen Waters MD on 1/17/2022 at 1:20 PM

## 2022-01-17 NOTE — BH NOTE
Purposeful Rounding    Patient concerns reported:NONE NOTED.  PT IN BED SLEEPING    Nurse made aware:NNO    Patient Turned and repositioned: Independent    Patient Toileted: Independent    Fall Precautions in Place: Yellow non-skid socks on, Bed locked in low position, 1/2 bed rails up, Lighting appropriate, Room free of clutter and Clear path to bathroom      Electronically signed by Chuy Duran on 1/16/22 at 11:58 PM EST

## 2022-01-17 NOTE — PROGRESS NOTES
Department of Psychiatry  AttendingProgress Note  Chief Complaint: ray  Vanargentina Cruz has shown less intrusiveness and pressured speech. He received his second [place] yesterday without complications. He is excited to leave soon and start the [facility] program. He is still med focused and is now requesting Nicorette gum. Patient's chart was reviewed and collaborated with  about the treatment plan. SUBJECTIVE:    Patient is feeling better. Suicidal ideation:  denies suicidal ideation. Patient does not have medication side effects. ROS: Patient has new complaints: no  Sleeping adequately:  Yes   Appetite adequate: Yes  Attending groups: Yes  Visitors:No    OBJECTIVE    Physical  VITALS:  /80   Pulse 106   Temp 98.3 °F (36.8 °C) (Oral)   Resp 18   Ht 6' (1.829 m)   Wt 190 lb (86.2 kg)   SpO2 97%   BMI 25.77 kg/m²     Mental Status Examination:  Patients appearance was street clothes. Thoughts are Goal directed. Homicidal ideations none. No abnormal movements, tics or mannerisms. Memory intact Aims 0. Concentration Good. Alert and oriented X 4. Insight and Judgement impaired insight. Patient was cooperative. Patient gait normal. Mood labile, affect labile affect Hallucinations Absent, suicidal ideations no specific plan to harm self Speech pressured  Data  Labs:   Admission on 01/09/2022   Component Date Value Ref Range Status    Amphetamine Screen, Urine 01/09/2022 Neg  Negative <1000ng/mL Final    Barbiturate Screen, Ur 01/09/2022 Neg  Negative <200 ng/mL Final    Benzodiazepine Screen, Urine 01/09/2022 Neg  Negative <200 ng/mL Final    Cannabinoid Scrn, Ur 01/09/2022 POSITIVE* Negative <50 ng/mL Final    Cocaine Metabolite Screen, Urine 01/09/2022 Neg  Negative <300 ng/mL Final    Opiate Scrn, Ur 01/09/2022 Neg  Negative <300 ng/mL Final    Comment: \"Therapeutic levels of pain medication, especially oxycontin and synthetic  opioids, may not be detected by this Methodology. Glucose 01/09/2022 79  70 - 99 mg/dL Final    BUN 01/09/2022 10  7 - 20 mg/dL Final    CREATININE 01/09/2022 0.6* 0.9 - 1.3 mg/dL Final    GFR Non- 01/09/2022 >60  >60 Final    Comment: >60 mL/min/1.73m2 EGFR, calc. for ages 25 and older using the  MDRD formula (not corrected for weight), is valid for stable  renal function.  GFR  01/09/2022 >60  >60 Final    Comment: Chronic Kidney Disease: less than 60 ml/min/1.73 sq.m. Kidney Failure: less than 15 ml/min/1.73 sq.m. Results valid for patients 18 years and older.       Calcium 01/09/2022 9.1  8.3 - 10.6 mg/dL Final    Total Protein 01/09/2022 7.1  6.4 - 8.2 g/dL Final    Albumin 01/09/2022 4.1  3.4 - 5.0 g/dL Final    Albumin/Globulin Ratio 01/09/2022 1.4  1.1 - 2.2 Final    Total Bilirubin 01/09/2022 1.2* 0.0 - 1.0 mg/dL Final    Alkaline Phosphatase 01/09/2022 54  40 - 129 U/L Final    ALT 01/09/2022 34  10 - 40 U/L Final    AST 01/09/2022 38* 15 - 37 U/L Final    WBC 01/09/2022 6.2  4.0 - 11.0 K/uL Final    RBC 01/09/2022 4.27  4.20 - 5.90 M/uL Final    Hemoglobin 01/09/2022 13.1* 13.5 - 17.5 g/dL Final    Hematocrit 01/09/2022 39.5* 40.5 - 52.5 % Final    MCV 01/09/2022 92.4  80.0 - 100.0 fL Final    MCH 01/09/2022 30.6  26.0 - 34.0 pg Final    MCHC 01/09/2022 33.1  31.0 - 36.0 g/dL Final    RDW 01/09/2022 12.6  12.4 - 15.4 % Final    Platelets 24/41/5906 266  135 - 450 K/uL Final    MPV 01/09/2022 7.6  5.0 - 10.5 fL Final    Neutrophils % 01/09/2022 47.9  % Final    Lymphocytes % 01/09/2022 37.8  % Final    Monocytes % 01/09/2022 11.6  % Final    Eosinophils % 01/09/2022 1.9  % Final    Basophils % 01/09/2022 0.8  % Final    Neutrophils Absolute 01/09/2022 3.0  1.7 - 7.7 K/uL Final    Lymphocytes Absolute 01/09/2022 2.3  1.0 - 5.1 K/uL Final    Monocytes Absolute 01/09/2022 0.7  0.0 - 1.3 K/uL Final    Eosinophils Absolute 01/09/2022 0.1  0.0 - 0.6 K/uL Final    Basophils Absolute 01/09/2022 0.1  0.0 - 0.2 K/uL Final    Acetaminophen Level 01/09/2022 <5* 10 - 30 ug/mL Final    Comment: Therapeutic Range: 10.0-30.0 ug/mL  Toxic: >=567 ug/mL      Salicylate, Serum 45/63/6106 <0.3* 15.0 - 30.0 mg/dL Final    Comment: Therapeutic Range: 15.0-30.0 mg/dL  Toxic: >30.0 mg/dL      Ethanol Lvl 01/09/2022 None Detected  mg/dL Final    Comment:    None Detected  Conversion factor:  100 mg/dl = .100 g/dl  For Medical Purposes Only      SARS-CoV-2 RNA, RT PCR 01/09/2022 DETECTED* NOT DETECTED Final    Comment: Detected results are indicative of the presence of SARS-CoV-2,  clinical correlation with patient history and other diagnostic  information is necessary to determine patient infection status. A Detected results do not rule out bacterial infection or  co-infection with other pathogens. Critical called to and read back by Dr. Leatha Reno DR 01/09/2022  20:04  Testing was performed using KRYSTIAN NANCY SARS-CoV-2 and Influenza A/B  nucleic acid assay. This test is a multiplex Real-Time Reverse  Transcriptase Polymerase Chain Reaction (RT-PCR)-based in vitro  diagnostic test intended for the qualitative detection of nucleic  acids from SARS-CoV-2, influenza A, and influenza B in nasopharyngeal  and nasal swab specimens for use under the FDAs Emergency Use  Authorization (EUA) only. Patient Fact Sheet:  FindDrives.pl  Provider Fact Sheet: FindDrives.pl  EUA: FindDrives.pl  IFU: PayResort.com.pt                           87/download    Methodology:  RT-PCR      INFLUENZA A 01/09/2022 NOT DETECTED  NOT DETECTED Final    Comment: Note:  Influenza A and Influenza B negative results should be considered  presumptive in samples that have a Detected SARS-CoV-2 result. Consider  re-testing with an alternate FDA-approved test for Flu A & B if clinically  indicated.       INFLUENZA B 01/09/2022 NOT DETECTED  NOT DETECTED Final    Comment: Note:  Influenza A and Influenza B negative results should be considered  presumptive in samples that have a Detected SARS-CoV-2 result. Consider  re-testing with an alternate FDA-approved test for Flu A & B if clinically  indicated.  Cholesterol, Total 01/09/2022 170  0 - 199 mg/dL Final    Triglycerides 01/09/2022 72  0 - 150 mg/dL Final    HDL 01/09/2022 63* 40 - 60 mg/dL Final    LDL Calculated 01/09/2022 93  <100 mg/dL Final    VLDL Cholesterol Calculated 01/09/2022 14  Not Established mg/dL Final    Hemoglobin A1C 01/09/2022 4.7  See comment % Final    Comment: Comment:  Diagnosis of Diabetes: > or = 6.5%  Increased risk of diabetes (Prediabetes): 5.7-6.4%  Glycemic Control: Nonpregnant Adults: <7.0%                    Pregnant: <6.0%        eAG 01/09/2022 88.2  mg/dL Final    TSH 01/09/2022 1.01  0.27 - 4.20 uIU/mL Final    Color, UA 01/12/2022 Yellow  Straw/Yellow Final    Clarity, UA 01/12/2022 Clear  Clear Final    Glucose, Ur 01/12/2022 Negative  Negative mg/dL Final    Bilirubin Urine 01/12/2022 Negative  Negative Final    Ketones, Urine 01/12/2022 Negative  Negative mg/dL Final    Specific Gravity, UA 01/12/2022 <=1.005  1.005 - 1.030 Final    Blood, Urine 01/12/2022 Negative  Negative Final    pH, UA 01/12/2022 6.5  5.0 - 8.0 Final    Protein, UA 01/12/2022 Negative  Negative mg/dL Final    Urobilinogen, Urine 01/12/2022 0.2  <2.0 E.U./dL Final    Nitrite, Urine 01/12/2022 Negative  Negative Final    Leukocyte Esterase, Urine 01/12/2022 Negative  Negative Final    Microscopic Examination 01/12/2022 Not Indicated   Final    Urine Type 01/12/2022 NotGiven   Final    Urine received in a container without preservatives.     Urine Reflex to Culture 01/12/2022 Not Indicated   Final    N. gonorrhoeae DNA, Urine 01/12/2022 Negative  Negative Final    C. trachomatis DNA ,Urine 01/12/2022 Negative  Negative Final            Medications  Current Facility-Administered Medications: nicotine polacrilex (NICORETTE) gum 4 mg, 4 mg, Oral, Q2H PRN  meloxicam (MOBIC) tablet 15 mg, 15 mg, Oral, Daily PRN  hydrOXYzine (VISTARIL) capsule 50 mg, 50 mg, Oral, Q6H PRN  LORazepam (ATIVAN) tablet 2 mg, 2 mg, Oral, Q6H PRN **OR** LORazepam (ATIVAN) injection 2 mg, 2 mg, IntraMUSCular, Q6H PRN  nicotine (NICODERM CQ) 21 MG/24HR 1 patch, 1 patch, TransDERmal, Daily  carboxymethylcellulose PF (THERATEARS) 1 % ophthalmic gel 1 drop, 1 drop, Both Eyes, Q4H PRN  sodium chloride (OCEAN, BABY AYR) 0.65 % nasal spray 1 spray, 1 spray, Each Nostril, Q4H PRN  cariprazine hcl (VRAYLAR) capsule 3 mg, 3 mg, Oral, Daily  acetaminophen (TYLENOL) tablet 650 mg, 650 mg, Oral, Q4H PRN  magnesium hydroxide (MILK OF MAGNESIA) 400 MG/5ML suspension 30 mL, 30 mL, Oral, Daily PRN  aluminum & magnesium hydroxide-simethicone (MAALOX) 200-200-20 MG/5ML suspension 30 mL, 30 mL, Oral, Q6H PRN  OLANZapine (ZYPREXA) tablet 10 mg, 10 mg, Oral, Q8H PRN **OR** OLANZapine (ZYPREXA) injection 10 mg, 10 mg, IntraMUSCular, Q8H PRN  sterile water injection 2.1 mL, 2.1 mL, IntraMUSCular, Q4H PRN  traZODone (DESYREL) tablet 50 mg, 50 mg, Oral, Nightly PRN  lactobacillus (CULTURELLE) capsule 1 capsule, 1 capsule, Oral, Daily  melatonin disintegrating tablet 10 mg, 10 mg, Oral, Nightly    ASSESSMENT AND PLAN    Principal Problem:    Autistic spectrum disorder  Active Problems:    Bipolar affective disorder, current episode hypomanic (HCC)    COVID-19    Closed displaced fracture of proximal phalanx of lesser toe of right foot    Marijuana use    Tobacco dependence  Resolved Problems:    * No resolved hospital problems. *       1. Patient s symptoms   are improving  2. Probable discharge is tomorrow  3. Discharge planning is complete  4. Suicidal ideation is none  5. Total time with patient was 40 minutes and more than 50 % of that time was spent counseling the patient on their symptoms, treatment and expected goals.

## 2022-01-17 NOTE — BH NOTE
Purposeful Rounding    Patient concerns reported:NONE NOTED.  PT IN BED SLEEPING    Nurse made aware:NO    Patient Turned and repositioned: Independent    Patient Toileted: Independent    Fall Precautions in Place: Yellow non-skid socks on, Bed locked in low position, 1/2 bed rails up, Lighting appropriate, Room free of clutter and Clear path to bathroom      Electronically signed by Lord Barnhart on 1/17/22 at 3:15 AM EST

## 2022-01-17 NOTE — BH NOTE
In conjunction with pt, attempted x3 to contact the [facility] at [facility], but there was no answer each time. Will attempt to call again tomorrow.

## 2022-01-17 NOTE — BH NOTE
Pt requested PRN Ativan d/t feeling anxious about wife not agreeing to a divorce or dissolution from him. Administered at this time. Eating in dayroom, listening to music. Will continue to monitor.

## 2022-01-17 NOTE — BH NOTE
Purposeful Rounding    Patient concerns reported:PT STATED HE IS IN PAIN.  PT AT [facility]     Nurse made aware:YES    Patient Turned and repositioned: Independent    Patient Toileted: Independent    Fall Precautions in Place: Yellow non-skid socks on, Lighting appropriate, Room free of clutter and Clear path to bathroom      Electronically signed by Karolina Bojorquez on 1/16/22 at 7:17 PM EST

## 2022-01-17 NOTE — BH NOTE
Purposeful Rounding    Patient concerns reported:NONE NOTED.  PT IN BED RESTING    Nurse made aware:NO    Patient Turned and repositioned: Independent    Patient Toileted: Independent    Fall Precautions in Place: Yellow non-skid socks on, Bed locked in low position, 1/2 bed rails up, Lighting appropriate, Room free of clutter and Clear path to bathroom      Electronically signed by Tejas Nunes on 1/16/22 at 9:41 PM EST

## 2022-01-17 NOTE — PLAN OF CARE
Problem: Depressive Behavior With or Without Suicide Precautions:  Goal: Able to verbalize acceptance of life and situations over which he or she has no control  Description: Able to verbalize acceptance of life and situations over which he or she has no control  Outcome: Ongoing     Problem: Depressive Behavior With or Without Suicide Precautions:  Goal: Able to verbalize and/or display a decrease in depressive symptoms  Description: Able to verbalize and/or display a decrease in depressive symptoms  Outcome: Ongoing    Pt has been intrusive with staff. He can be verbally aggressive when limits are set. He denies SI/HI/AVH and no RTIS noted. He was medication compliant. Prn ativan given at 1932, trazodone at 2127, and tylenol at 1928. Prns have been effective. He has been pacing around the unit but is now resting in is room.

## 2022-01-17 NOTE — BH NOTE
Pt c/o and showing s/s anxiety and agitation. Requested PRN Ativan and Zyprexa. Administered at this time. Will continue to monitor. 0900-Pt calm and cooperative at this time. Will continue to monitor. 1015-Pt resting in bed at this time.

## 2022-01-17 NOTE — BH NOTE
Writer met with patient individually. Patient discussed with writer his recent breakup and that his wife won't give him a divorce. Patient began to get tearful and discussed his previous relationships. Patient mentioned his weight and took the mattress off of the bed to weigh himself.  Patient discussed discharge and his plans for when he leaves the hospital.

## 2022-01-17 NOTE — BH NOTE
This afternoon, this writer met with pt for leisure and recreational activity. [employer] appeared less anxious and argumentative, even verbalizing recognition of this, stating \"I feel different today. I know its the ray. \" This writer and [employer] played games [employer] and [employer]. Appropriate interactions across gameplay.     Rosa , MM, MT-BC

## 2022-01-17 NOTE — BH NOTE
Purposeful Rounding    Patient concerns reported:NONE NOTED.  PT IN ROOM    Nurse made aware:NO    Patient Turned and repositioned: Independent    Patient Toileted: Independent    Fall Precautions in Place: Yellow non-skid socks on, Lighting appropriate, Room free of clutter and Clear path to bathroom      Electronically signed by Abe Holloway on 1/17/22 at 7:15 AM EST

## 2022-01-18 PROCEDURE — 1240000000 HC EMOTIONAL WELLNESS R&B

## 2022-01-18 PROCEDURE — 6370000000 HC RX 637 (ALT 250 FOR IP)

## 2022-01-18 PROCEDURE — 6370000000 HC RX 637 (ALT 250 FOR IP): Performed by: PSYCHIATRY & NEUROLOGY

## 2022-01-18 PROCEDURE — 99233 SBSQ HOSP IP/OBS HIGH 50: CPT | Performed by: PSYCHIATRY & NEUROLOGY

## 2022-01-18 RX ORDER — MECOBALAMIN 5000 MCG
10 TABLET,DISINTEGRATING ORAL NIGHTLY
Qty: 30 TABLET | Refills: 0 | Status: ON HOLD | OUTPATIENT
Start: 2022-01-18 | End: 2022-03-07 | Stop reason: HOSPADM

## 2022-01-18 RX ORDER — LACTOBACILLUS RHAMNOSUS GG 10B CELL
1 CAPSULE ORAL DAILY
Qty: 30 CAPSULE | Refills: 0 | Status: ON HOLD | OUTPATIENT
Start: 2022-01-19 | End: 2022-03-07 | Stop reason: HOSPADM

## 2022-01-18 RX ORDER — POLYETHYLENE GLYCOL 3350 17 G
POWDER IN PACKET (EA) ORAL
Status: COMPLETED
Start: 2022-01-18 | End: 2022-01-18

## 2022-01-18 RX ORDER — POLYETHYLENE GLYCOL 3350 17 G
2 POWDER IN PACKET (EA) ORAL
Status: DISCONTINUED | OUTPATIENT
Start: 2022-01-18 | End: 2022-01-18

## 2022-01-18 RX ORDER — POLYETHYLENE GLYCOL 3350 17 G
4 POWDER IN PACKET (EA) ORAL
Status: DISCONTINUED | OUTPATIENT
Start: 2022-01-18 | End: 2022-01-19 | Stop reason: HOSPADM

## 2022-01-18 RX ADMIN — LORAZEPAM 2 MG: 2 TABLET ORAL at 18:10

## 2022-01-18 RX ADMIN — NICOTINE POLACRILEX 4 MG: 4 LOZENGE ORAL at 14:29

## 2022-01-18 RX ADMIN — MAGNESIUM HYDROXIDE 30 ML: 400 SUSPENSION ORAL at 14:45

## 2022-01-18 RX ADMIN — CARIPRAZINE 3 MG: 3 CAPSULE, GELATIN COATED ORAL at 08:15

## 2022-01-18 RX ADMIN — HYDROXYZINE PAMOATE 50 MG: 50 CAPSULE ORAL at 08:15

## 2022-01-18 RX ADMIN — NICOTINE POLACRILEX 2 MG: 2 LOZENGE ORAL at 10:22

## 2022-01-18 RX ADMIN — NICOTINE POLACRILEX 4 MG: 4 GUM, CHEWING BUCCAL at 08:17

## 2022-01-18 RX ADMIN — MAGNESIUM HYDROXIDE/ALUMINUM HYDROXICE/SIMETHICONE 30 ML: 120; 1200; 1200 SUSPENSION ORAL at 08:38

## 2022-01-18 RX ADMIN — NICOTINE POLACRILEX 4 MG: 4 LOZENGE ORAL at 20:52

## 2022-01-18 RX ADMIN — TRAZODONE HYDROCHLORIDE 50 MG: 50 TABLET ORAL at 20:52

## 2022-01-18 RX ADMIN — ACETAMINOPHEN 650 MG: 325 TABLET ORAL at 04:00

## 2022-01-18 RX ADMIN — ACETAMINOPHEN 650 MG: 325 TABLET ORAL at 14:17

## 2022-01-18 RX ADMIN — SALINE NASAL SPRAY 1 SPRAY: 1.5 SOLUTION NASAL at 18:09

## 2022-01-18 RX ADMIN — LORAZEPAM 2 MG: 2 TABLET ORAL at 04:00

## 2022-01-18 RX ADMIN — SALINE NASAL SPRAY 1 SPRAY: 1.5 SOLUTION NASAL at 08:15

## 2022-01-18 RX ADMIN — Medication 10 MG: at 20:52

## 2022-01-18 RX ADMIN — NICOTINE POLACRILEX 4 MG: 4 GUM, CHEWING BUCCAL at 03:59

## 2022-01-18 RX ADMIN — HYDROXYZINE PAMOATE 50 MG: 50 CAPSULE ORAL at 20:52

## 2022-01-18 RX ADMIN — NICOTINE POLACRILEX 4 MG: 4 LOZENGE ORAL at 18:51

## 2022-01-18 RX ADMIN — MELOXICAM 15 MG: 15 TABLET ORAL at 10:22

## 2022-01-18 RX ADMIN — CARBOXYMETHYLCELLULOSE SODIUM 1 DROP: 10 GEL OPHTHALMIC at 10:21

## 2022-01-18 RX ADMIN — Medication 1 CAPSULE: at 08:15

## 2022-01-18 RX ADMIN — NICOTINE POLACRILEX 4 MG: 4 LOZENGE ORAL at 16:40

## 2022-01-18 RX ADMIN — NICOTINE POLACRILEX 4 MG: 4 GUM, CHEWING BUCCAL at 06:08

## 2022-01-18 RX ADMIN — LORAZEPAM 2 MG: 2 TABLET ORAL at 10:22

## 2022-01-18 ASSESSMENT — PAIN SCALES - GENERAL
PAINLEVEL_OUTOF10: 2
PAINLEVEL_OUTOF10: 0
PAINLEVEL_OUTOF10: 3
PAINLEVEL_OUTOF10: 10
PAINLEVEL_OUTOF10: 0
PAINLEVEL_OUTOF10: 7

## 2022-01-18 NOTE — PROGRESS NOTES
Department of Psychiatry  AttendingProgress Note  Chief Complaint: hypomania  Karen Landa is doing relatively well. He is able to interact more appropriately and take redirection. Is excited to leave today or tomorrow for [employer]. No covid sxs. Meds stable at this time  Patient's chart was reviewed and collaborated with  about the treatment plan. SUBJECTIVE:    Patient is feeling better. Suicidal ideation:  denies suicidal ideation. Patient does not have medication side effects. ROS: Patient has new complaints: no  Sleeping adequately:  Yes   Appetite adequate: Yes  Attending groups: Yes  Visitors:No    OBJECTIVE    Physical  VITALS:  /80   Pulse 98   Temp 97.5 °F (36.4 °C) (Oral)   Resp 18   Ht 6' (1.829 m)   Wt 190 lb (86.2 kg)   SpO2 98%   BMI 25.77 kg/m²     Mental Status Examination:  Patients appearance was street clothes. Thoughts are Goal directed. Homicidal ideations none. No abnormal movements, tics or mannerisms. Memory intact Aims 0. Concentration Fair. Alert and oriented X 4. Insight and Judgement impaired insight. Patient was cooperative. Patient gait normal. Mood labile, affect elevated affect Hallucinations Absent, suicidal ideations no specific plan to harm self Speech normal volume  Data  Labs:   Admission on 01/09/2022   Component Date Value Ref Range Status    Amphetamine Screen, Urine 01/09/2022 Neg  Negative <1000ng/mL Final    Barbiturate Screen, Ur 01/09/2022 Neg  Negative <200 ng/mL Final    Benzodiazepine Screen, Urine 01/09/2022 Neg  Negative <200 ng/mL Final    Cannabinoid Scrn, Ur 01/09/2022 POSITIVE* Negative <50 ng/mL Final    Cocaine Metabolite Screen, Urine 01/09/2022 Neg  Negative <300 ng/mL Final    Opiate Scrn, Ur 01/09/2022 Neg  Negative <300 ng/mL Final    Comment: \"Therapeutic levels of pain medication, especially oxycontin and synthetic  opioids, may not be detected by this Methodology.  Pain management screen  panel  Drug panel-PM-Hi Res Ur, Interp (PAIN) should be considered for drug  monitoring \".  PCP Screen, Urine 01/09/2022 Neg  Negative <25 ng/mL Final    Methadone Screen, Urine 01/09/2022 Neg  Negative <300 ng/mL Final    Propoxyphene Scrn, Ur 01/09/2022 Neg  Negative <300 ng/mL Final    Oxycodone Urine 01/09/2022 Neg  Negative <100 ng/ml Final    pH, UA 01/09/2022 6.0   Final    Comment: Urine pH less than 5.0 or greater than 8.0 may indicate sample adulteration. Another sample should be collected if clinically  indicated.  Drug Screen Comment: 01/09/2022 see below   Final    Comment: This method is a screening test to detect only these drug  classes as part of a medical workup. Confirmatory testing  by another method should be ordered if clinically indicated.  Color, UA 01/09/2022 Straw  Straw/Yellow Final    Clarity, UA 01/09/2022 Clear  Clear Final    Glucose, Ur 01/09/2022 Negative  Negative mg/dL Final    Bilirubin Urine 01/09/2022 Negative  Negative Final    Ketones, Urine 01/09/2022 Negative  Negative mg/dL Final    Specific Gravity, UA 01/09/2022 1.010  1.005 - 1.030 Final    Blood, Urine 01/09/2022 Negative  Negative Final    pH, UA 01/09/2022 6.0  5.0 - 8.0 Final    Protein, UA 01/09/2022 Negative  Negative mg/dL Final    Urobilinogen, Urine 01/09/2022 0.2  <2.0 E.U./dL Final    Nitrite, Urine 01/09/2022 Negative  Negative Final    Leukocyte Esterase, Urine 01/09/2022 Negative  Negative Final    Microscopic Examination 01/09/2022 Not Indicated   Final    Urine Type 01/09/2022 NotGiven   Final    Urine received in a container without preservatives.     Urine Reflex to Culture 01/09/2022 Not Indicated   Final    Sodium 01/09/2022 140  136 - 145 mmol/L Final    Potassium 01/09/2022 4.1  3.5 - 5.1 mmol/L Final    Chloride 01/09/2022 104  99 - 110 mmol/L Final    CO2 01/09/2022 28  21 - 32 mmol/L Final    Anion Gap 01/09/2022 8  3 - 16 Final    Glucose 01/09/2022 79  70 - 99 mg/dL Final    BUN 01/09/2022 10  7 - 20 mg/dL Final    CREATININE 01/09/2022 0.6* 0.9 - 1.3 mg/dL Final    GFR Non- 01/09/2022 >60  >60 Final    Comment: >60 mL/min/1.73m2 EGFR, calc. for ages 25 and older using the  MDRD formula (not corrected for weight), is valid for stable  renal function.  GFR  01/09/2022 >60  >60 Final    Comment: Chronic Kidney Disease: less than 60 ml/min/1.73 sq.m. Kidney Failure: less than 15 ml/min/1.73 sq.m. Results valid for patients 18 years and older.       Calcium 01/09/2022 9.1  8.3 - 10.6 mg/dL Final    Total Protein 01/09/2022 7.1  6.4 - 8.2 g/dL Final    Albumin 01/09/2022 4.1  3.4 - 5.0 g/dL Final    Albumin/Globulin Ratio 01/09/2022 1.4  1.1 - 2.2 Final    Total Bilirubin 01/09/2022 1.2* 0.0 - 1.0 mg/dL Final    Alkaline Phosphatase 01/09/2022 54  40 - 129 U/L Final    ALT 01/09/2022 34  10 - 40 U/L Final    AST 01/09/2022 38* 15 - 37 U/L Final    WBC 01/09/2022 6.2  4.0 - 11.0 K/uL Final    RBC 01/09/2022 4.27  4.20 - 5.90 M/uL Final    Hemoglobin 01/09/2022 13.1* 13.5 - 17.5 g/dL Final    Hematocrit 01/09/2022 39.5* 40.5 - 52.5 % Final    MCV 01/09/2022 92.4  80.0 - 100.0 fL Final    MCH 01/09/2022 30.6  26.0 - 34.0 pg Final    MCHC 01/09/2022 33.1  31.0 - 36.0 g/dL Final    RDW 01/09/2022 12.6  12.4 - 15.4 % Final    Platelets 50/11/9796 266  135 - 450 K/uL Final    MPV 01/09/2022 7.6  5.0 - 10.5 fL Final    Neutrophils % 01/09/2022 47.9  % Final    Lymphocytes % 01/09/2022 37.8  % Final    Monocytes % 01/09/2022 11.6  % Final    Eosinophils % 01/09/2022 1.9  % Final    Basophils % 01/09/2022 0.8  % Final    Neutrophils Absolute 01/09/2022 3.0  1.7 - 7.7 K/uL Final    Lymphocytes Absolute 01/09/2022 2.3  1.0 - 5.1 K/uL Final    Monocytes Absolute 01/09/2022 0.7  0.0 - 1.3 K/uL Final    Eosinophils Absolute 01/09/2022 0.1  0.0 - 0.6 K/uL Final    Basophils Absolute 01/09/2022 0.1  0.0 - 0.2 K/uL Final    Acetaminophen Level 01/09/2022 <5* 10 - 30 ug/mL Final    Comment: Therapeutic Range: 10.0-30.0 ug/mL  Toxic: >=379 ug/mL      Salicylate, Serum 47/08/0274 <0.3* 15.0 - 30.0 mg/dL Final    Comment: Therapeutic Range: 15.0-30.0 mg/dL  Toxic: >30.0 mg/dL      Ethanol Lvl 01/09/2022 None Detected  mg/dL Final    Comment:    None Detected  Conversion factor:  100 mg/dl = .100 g/dl  For Medical Purposes Only      SARS-CoV-2 RNA, RT PCR 01/09/2022 DETECTED* NOT DETECTED Final    Comment: Detected results are indicative of the presence of SARS-CoV-2,  clinical correlation with patient history and other diagnostic  information is necessary to determine patient infection status. A Detected results do not rule out bacterial infection or  co-infection with other pathogens. Critical called to and read back by Dr. Jeff Ferrer DR 01/09/2022  20:04  Testing was performed using KRYSTIAN NANCY SARS-CoV-2 and Influenza A/B  nucleic acid assay. This test is a multiplex Real-Time Reverse  Transcriptase Polymerase Chain Reaction (RT-PCR)-based in vitro  diagnostic test intended for the qualitative detection of nucleic  acids from SARS-CoV-2, influenza A, and influenza B in nasopharyngeal  and nasal swab specimens for use under the FDAs Emergency Use  Authorization (EUA) only. Patient Fact Sheet:  FindDrives.pl  Provider Fact Sheet: FindDrives.pl  EUA: FindDrives.pl  IFU: PayResort.com.pt                           87/download    Methodology:  RT-PCR      INFLUENZA A 01/09/2022 NOT DETECTED  NOT DETECTED Final    Comment: Note:  Influenza A and Influenza B negative results should be considered  presumptive in samples that have a Detected SARS-CoV-2 result. Consider  re-testing with an alternate FDA-approved test for Flu A & B if clinically  indicated.       INFLUENZA B 01/09/2022 NOT DETECTED  NOT DETECTED Final    Comment: Note: polacrilex (COMMIT) lozenge 2 mg, 2 mg, Oral, Q2H PRN  meloxicam (MOBIC) tablet 15 mg, 15 mg, Oral, Daily PRN  hydrOXYzine (VISTARIL) capsule 50 mg, 50 mg, Oral, Q6H PRN  LORazepam (ATIVAN) tablet 2 mg, 2 mg, Oral, Q6H PRN **OR** LORazepam (ATIVAN) injection 2 mg, 2 mg, IntraMUSCular, Q6H PRN  nicotine (NICODERM CQ) 21 MG/24HR 1 patch, 1 patch, TransDERmal, Daily  carboxymethylcellulose PF (THERATEARS) 1 % ophthalmic gel 1 drop, 1 drop, Both Eyes, Q4H PRN  sodium chloride (OCEAN, BABY AYR) 0.65 % nasal spray 1 spray, 1 spray, Each Nostril, Q4H PRN  cariprazine hcl (VRAYLAR) capsule 3 mg, 3 mg, Oral, Daily  acetaminophen (TYLENOL) tablet 650 mg, 650 mg, Oral, Q4H PRN  magnesium hydroxide (MILK OF MAGNESIA) 400 MG/5ML suspension 30 mL, 30 mL, Oral, Daily PRN  aluminum & magnesium hydroxide-simethicone (MAALOX) 200-200-20 MG/5ML suspension 30 mL, 30 mL, Oral, Q6H PRN  OLANZapine (ZYPREXA) tablet 10 mg, 10 mg, Oral, Q8H PRN **OR** OLANZapine (ZYPREXA) injection 10 mg, 10 mg, IntraMUSCular, Q8H PRN  sterile water injection 2.1 mL, 2.1 mL, IntraMUSCular, Q4H PRN  traZODone (DESYREL) tablet 50 mg, 50 mg, Oral, Nightly PRN  lactobacillus (CULTURELLE) capsule 1 capsule, 1 capsule, Oral, Daily  melatonin disintegrating tablet 10 mg, 10 mg, Oral, Nightly    ASSESSMENT AND PLAN    Principal Problem:    Autistic spectrum disorder  Active Problems:    Bipolar affective disorder, current episode hypomanic (HCC)    COVID-19    Closed displaced fracture of proximal phalanx of lesser toe of right foot    Marijuana use    Tobacco dependence  Resolved Problems:    * No resolved hospital problems. *       1. Patient s symptoms   are improving  2. Probable discharge is tomorrow  3. Discharge planning is complete  4. Suicidal ideation is none  5. Total time with patient was 40 minutes and more than 50 % of that time was spent counseling the patient on their symptoms, treatment and expected goals.

## 2022-01-18 NOTE — BH NOTE
Pt c/o anxiety 6/10. Requesting PRN Vistaril. Administered at this time. Pt walking unit and making phone calls. Will continue to monitor. 0915-Pt reports anxiety is now 10/10. Requesting PRN Ativan which is not able to be given until 1000. Playing music for pt.     1022-PRN Ativan administered at this time. 1106-Pt reports anxiety 0/10. Attending group at this time.

## 2022-01-18 NOTE — GROUP NOTE
Group Therapy Note    Date: 1/18/2022    Group Start Time: 1030  Group End Time: 1115  Group Topic: Recreational    [id number] [address]        Group Therapy Note    Attendees: 4    Group members engaged in [employer] while listening to [employer] music and engaging in conversation about it. Notes:  Ryan attended group for the full duration. He completed the activity and interacted appropriately with others in the group. Status After Intervention:  Improved    Participation Level:  Active Listener and Interactive    Participation Quality: Appropriate, Attentive and Sharing      Speech:  normal      Thought Process/Content: Logical      Affective Functioning: Congruent      Mood: Engaged       Level of consciousness:  Alert, Oriented x4 and Attentive      Response to Learning: Able to verbalize current knowledge/experience      Endings: None Reported    Modes of Intervention: Activity      Discipline Responsible: Behavorial Health Tech      Signature:  ALBRETO Salazar

## 2022-01-18 NOTE — BH NOTE
Purposeful Rounding    Patient concerns reported:NONE NOTED.  PT IN BED SLEEPING    Nurse made aware:NO    Patient Turned and repositioned: Independent    Patient Toileted: Independent    Fall Precautions in Place: Yellow non-skid socks on, Bed locked in low position, 1/2 bed rails up, Lighting appropriate, Room free of clutter and Clear path to bathroom      Electronically signed by Gary Cordoba on 1/17/22 at 9:14 PM EST

## 2022-01-18 NOTE — GROUP NOTE
Group Therapy Note    Date: 1/18/2022    Group Start Time: 0915  Group End Time: 5312  Group Topic: Recreational    [address]        Group Therapy Note    Attendees: 2    Group members engaged in games of basketball and bowling. Notes:  Norman attended group for the full duration. Collin Hopkins remained engaged and interacted approrpiately during the group. Status After Intervention:  Improved    Participation Level:  Active Listener and Interactive    Participation Quality: Appropriate, Attentive and Sharing      Speech:  normal      Thought Process/Content: Logical      Affective Functioning: Congruent      Mood: Brightened, Engaging     Level of consciousness:  Alert      Response to Learning: Able to verbalize current knowledge/experience      Endings: None Reported    Modes of Intervention: Activity      Discipline Responsible: Behavorial Health Tech      Signature:  ALBERTO Diallo

## 2022-01-18 NOTE — BH NOTE
Purposeful Rounding    Patient concerns reported:NONE NOTED.  PT IN BED SLEEPING    Nurse made aware:NO    Patient Turned and repositioned: Independent    Patient Toileted: Independent    Fall Precautions in Place: Yellow non-skid socks on, Bed locked in low position, 1/2 bed rails up, Lighting appropriate, Room free of clutter and Clear path to bathroom      Electronically signed by Pietro Up on 1/18/22 at 2:21 AM EST

## 2022-01-18 NOTE — BH NOTE
Purposeful Rounding    Patient concerns reported:NONE NOTED.  PT IN DAYROOM ON EXERCISE BIKE    Nurse made aware:NO    Patient Turned and repositioned: Independent    Patient Toileted: Independent    Fall Precautions in Place: Yellow non-skid socks on, Lighting appropriate, Room free of clutter and Clear path to bathroom      Electronically signed by Pietro Up on 1/18/22 at 5:53 AM EST

## 2022-01-18 NOTE — FLOWSHEET NOTE
Senior Purposeful Rounding    Position:Sitting in dayroom playing cards    Physical Environment:No safety hazards noted    Pain Rating/ Nonverbal Pain Behaviors:0; none    Pain interventions Attempted: NA    Patient Toileted:Independent

## 2022-01-18 NOTE — PLAN OF CARE
Problem: Altered Mood, Manic Behavior:  Goal: Able to verbalize decrease in frequency and intensity of racing thoughts  Description: Able to verbalize decrease in frequency and intensity of racing thoughts  Outcome: Ongoing     Problem: Altered Mood, Manic Behavior:  Goal: Ability to interact with others will improve  Description: Ability to interact with others will improve  Outcome: Ongoing     Problem: Altered Mood, Manic Behavior:  Goal: Ability to demonstrate self-control will improve  Description: Ability to demonstrate self-control will improve  Outcome: Ongoing  Pt has remained free from falls and injury so far this shift. Med compliant. A&O x4. Denies SI/HI, AVH. Interacts with staff and peers appropriately, easily redirected. Still making multiple requests for PRNs, snacks and drinks but accepts limits well. Intake adequate. Eating dinner at this time in the dayroom. Nonskid footwear on. Will continue to monitor.

## 2022-01-18 NOTE — BH NOTE
Meet with pt and provided support while he completed the screening process for the [facility] at [facility] in [place] ([phone]). Clinical faxed to Lanny at the [address] [facility] per her request for possible placement.

## 2022-01-18 NOTE — PLAN OF CARE
Patient denies SI/HI, AVH and pain. Patient requested Trazodone with PM medication. Patient medication compliant. Patient intrusive at times and has frequent requests. No aggressive behaviors. Monitoring patient q15 minutes for safety.

## 2022-01-18 NOTE — BH NOTE
Purposeful Rounding    Patient concerns reported:NONE NOTED.  PT IN BED SLEEPING    Nurse made aware:NO    Patient Turned and repositioned: Independent    Patient Toileted: Independent    Fall Precautions in Place: Yellow non-skid socks on, Bed locked in low position, 1/2 bed rails up, Lighting appropriate, Room free of clutter and Clear path to bathroom      Electronically signed by Carlos Villalobos on 1/17/22 at 11:26 PM EST

## 2022-01-18 NOTE — BH NOTE
Patient given Trazodone 50mg per request for sleep. Patient stated he will be leaving tomorrow and wants to make sure he gets a good night sleep.

## 2022-01-18 NOTE — FLOWSHEET NOTE
Senior Purposeful Rounding    Position:Repositions Self    Physical Environment:No safety hazards noted    Pain Rating/ Nonverbal Pain Behaviors:7; none    Pain interventions Attempted: Tylenol    Patient Toileted:Independent

## 2022-01-18 NOTE — FLOWSHEET NOTE
Senior Purposeful Rounding    Position:Ambulating in elaine    Physical Environment:No safety hazards noted    Pain Rating/ Nonverbal Pain Behaviors:0; none    Pain interventions Attempted:NA    Patient Toileted:Independent

## 2022-01-18 NOTE — BH NOTE
Patient given Ativan 2mg per request for anxiety.  Patient given Tylenol 650mg for pain of right foot 7/10 per request

## 2022-01-19 VITALS
TEMPERATURE: 97.5 F | BODY MASS INDEX: 25.73 KG/M2 | HEART RATE: 93 BPM | DIASTOLIC BLOOD PRESSURE: 76 MMHG | OXYGEN SATURATION: 98 % | WEIGHT: 190 LBS | RESPIRATION RATE: 18 BRPM | SYSTOLIC BLOOD PRESSURE: 133 MMHG | HEIGHT: 72 IN

## 2022-01-19 PROCEDURE — 5130000000 HC BRIDGE APPOINTMENT

## 2022-01-19 PROCEDURE — 6370000000 HC RX 637 (ALT 250 FOR IP): Performed by: PSYCHIATRY & NEUROLOGY

## 2022-01-19 PROCEDURE — 99239 HOSP IP/OBS DSCHRG MGMT >30: CPT | Performed by: PSYCHIATRY & NEUROLOGY

## 2022-01-19 RX ORDER — TRAZODONE HYDROCHLORIDE 50 MG/1
50 TABLET ORAL NIGHTLY PRN
Qty: 30 TABLET | Refills: 0 | Status: ON HOLD | OUTPATIENT
Start: 2022-01-19 | End: 2022-03-07 | Stop reason: HOSPADM

## 2022-01-19 RX ADMIN — MAGNESIUM HYDROXIDE/ALUMINUM HYDROXICE/SIMETHICONE 30 ML: 120; 1200; 1200 SUSPENSION ORAL at 11:44

## 2022-01-19 RX ADMIN — LORAZEPAM 2 MG: 2 TABLET ORAL at 05:15

## 2022-01-19 RX ADMIN — NICOTINE POLACRILEX 4 MG: 4 LOZENGE ORAL at 04:59

## 2022-01-19 RX ADMIN — NICOTINE POLACRILEX 4 MG: 4 LOZENGE ORAL at 07:40

## 2022-01-19 RX ADMIN — OLANZAPINE 10 MG: 10 TABLET, FILM COATED ORAL at 07:41

## 2022-01-19 RX ADMIN — Medication 1 CAPSULE: at 07:41

## 2022-01-19 RX ADMIN — OLANZAPINE 10 MG: 10 TABLET, FILM COATED ORAL at 15:05

## 2022-01-19 RX ADMIN — HYDROXYZINE PAMOATE 50 MG: 50 CAPSULE ORAL at 12:20

## 2022-01-19 RX ADMIN — CARIPRAZINE 3 MG: 3 CAPSULE, GELATIN COATED ORAL at 07:41

## 2022-01-19 RX ADMIN — MELOXICAM 15 MG: 15 TABLET ORAL at 07:41

## 2022-01-19 RX ADMIN — LORAZEPAM 2 MG: 2 TABLET ORAL at 11:17

## 2022-01-19 RX ADMIN — NICOTINE POLACRILEX 4 MG: 4 LOZENGE ORAL at 09:47

## 2022-01-19 RX ADMIN — NICOTINE POLACRILEX 4 MG: 4 LOZENGE ORAL at 11:42

## 2022-01-19 RX ADMIN — NICOTINE POLACRILEX 4 MG: 4 LOZENGE ORAL at 14:18

## 2022-01-19 RX ADMIN — ACETAMINOPHEN 650 MG: 325 TABLET ORAL at 04:59

## 2022-01-19 ASSESSMENT — PAIN SCALES - GENERAL
PAINLEVEL_OUTOF10: 10
PAINLEVEL_OUTOF10: 7

## 2022-01-19 NOTE — GROUP NOTE
Group Therapy Note    Date: 1/19/2022    Group Start Time: 1000  Group End Time: 1050  Group Topic: Recreational    [address]        Group Therapy Note    Attendees: 3     Group members engaged in a game of [employer]. Notes:  Norman attended group for the full duration. Margo Epstein remained engaged and interacted approrpiately. Status After Intervention:  Improved    Participation Level:  Active Listener and Interactive    Participation Quality: Appropriate and Attentive      Speech:  normal      Thought Process/Content: Logical      Affective Functioning: Congruent      Mood: Brightened, Engaging     Level of consciousness:  Alert, Oriented x4 and Attentive      Response to Learning: Able to retain information      Endings: None Reported    Modes of Intervention: Activity      Discipline Responsible: Behavorial Health Tech      Signature:  ALBERTO Barraza

## 2022-01-19 NOTE — BH NOTE
Purposeful Rounding    Patient concerns reported: NONE NOTED.  PT IN ROOM RESTING     Nurse made aware: N/A     Patient Turned and repositioned: Independent     Patient Toileted: Independent     Fall Precautions in Place: Yellow non-skid socks on, Lighting appropriate, Room free of clutter and Clear path to bathroom         Electronically signed by Kin Valentino on 1/19/22 at 3:19 AM EST

## 2022-01-19 NOTE — FLOWSHEET NOTE
Senior Purposeful Rounding    Position:Repositions Self    Physical Environment:Room free from clutter, Clear path to bathroom  and Adequate lighting    Pain Rating/ Nonverbal Pain Behaviors:0    Pain interventions Attempted: N/A    Patient Toileted:Continent

## 2022-01-19 NOTE — BH NOTE
Patient requesting Tylenol for right leg pain rates 7/10. Patient medicated with Tylenol 650 mg po. Patient also requesting nicotine lozenge for cravings, Patient medicated with nicotine lozenge 4 mg po.

## 2022-01-19 NOTE — FLOWSHEET NOTE
Senior Purposeful Rounding    Position:Repositions Self    Physical Environment:Room free from clutter and Clear path to bathroom     Pain Rating/ Nonverbal Pain Patient asleep Behaviors: Patient asleep    Pain interventions Attempted: None    Patient Toileted:Continent

## 2022-01-19 NOTE — BH NOTE
Purposeful Rounding    Patient concerns reported: NONE NOTED.  PT IN ROOM RESTING     Nurse made aware: N/A     Patient Turned and repositioned: Independent     Patient Toileted: Independent     Fall Precautions in Place: Yellow non-skid socks on, Lighting appropriate, Room free of clutter and Clear path to bathroom       Electronically signed by Vijay Alexis on 1/19/22 at 3:21 AM EST

## 2022-01-19 NOTE — PLAN OF CARE
Patient alert and oriented x 3. Patient rates Anxiety 5/10. Patient denies SI/HI/A/V/H. Patient seclusive to his bed and room this shift. 20:52 Patient medicated with Vistaril 50 mg po for anxiety. Patient stated, \"I am anxious about leaving tomorrow. \" Patient requesting Trazodone for sleep. Patient medicated with Trazodone 50 mg po for sleep. Patient given a nicotine lozenge 4 mg po for nicotine cravings. Patient denies self harm. Patient resting quietly in bed.

## 2022-01-19 NOTE — BH NOTE
Writer met with patient and discussed his music play list. Patient shared that he loved to sing and sang for writer. Patient discussed his upcoming discharge and his excitement for leaving after being inpatient for so long.

## 2022-01-19 NOTE — BH NOTE
[facility]  Discharge Note    Pt discharged with followings belongings:   Dental Appliances: Lowers,Uppers  Vision - Corrective Lenses: None  Hearing Aid: None  Jewelry: None  Body Piercings Removed: N/A  Clothing: Footwear,Undergarments (Comment),Pants,Shirt,Sweater,Socks  Were All Patient Medications Collected?: Not Applicable  Other Valuables: Samm De (Comment) (direct express mastercard, GO2 Visa card,  license)   Magdy Seals returned to patient. Patient education on aftercare instructions: Yes. Information faxed to [facility], [facility], [facility] by Philly Galvez RN  at 3:45 PM .Patient verbalize understanding of AVS:  Yes. Status EXAM upon discharge:  Status and Exam  Normal: No  Facial Expression: Exaggerated  Affect: Congruent  Level of Consciousness: Alert  Mood:Normal: No  Mood: Anxious  Motor Activity:Normal: No  Motor Activity: Increased  Interview Behavior: Cooperative,Impulsive  Preception: Logan to Person,Logan to Time,Logan to Place  Attention:Normal: No  Attention: Distractible  Thought Processes: Flt.of Ideas  Thought Content:Normal: No  Thought Content: Preoccupations  Hallucinations: None (Patient denies)  Delusions: Yes  Delusions: Grandeur  Memory:Normal: No  Memory: Confabulation  Insight and Judgment: No  Insight and Judgment: Poor Judgment,Poor Insight  Present Suicidal Ideation: No  Present Homicidal Ideation: No      Metabolic Screening:    Lab Results   Component Value Date    LABA1C 4.7 01/09/2022       Lab Results   Component Value Date    CHOL 170 01/09/2022     Lab Results   Component Value Date    TRIG 72 01/09/2022     Lab Results   Component Value Date    HDL 63 (H) 01/09/2022     No components found for: [facility]  Lab Results   Component Value Date    LABVLDL 14 01/09/2022       Bridge Appointment completed: Reviewed Discharge Instructions with patient.     Patient verbalizes understanding and agreement with the discharge plan using the teachback method.      Referral for Outpatient Tobacco Cessation Counseling, upon discharge (benedicto X if applicable and completed):    ( )  Hospital staff assisted patient to call Quit Line or faxed referral                                   during hospitalization                  (X)  Recognizing danger situations (included triggers and roadblocks), if not completed on admission                    (X)  Coping skills (new ways to manage stress, exercise, relaxation techniques, changing routine, distraction), if not completed on admission                                                           ( )  Basic information about quitting (benefits of quitting, techniques in how to quit, available resources, if not completed on admission  ( ) Referral for counseling faxed to [facility]   ( ) Patient refused referral  ( ) Patient refused counseling  (X) Patient refused smoking cessation medication upon discharge    Vaccinations (benedicto X if applicable and completed):  (X) Patient states already received influenza vaccine elsewhere  ( ) Patient received influenza vaccine during this hospitalization  ( ) Patient refused influenza vaccine at this time    Shanique Pang RN

## 2022-01-19 NOTE — BH NOTE
Patient requesting Ativan for anxiety rates 7/10. Patient medicated with Ativan 2 mg po for anxiety.

## 2022-01-19 NOTE — BH NOTE
Purposeful Rounding     Patient concerns reported: NONE NOTED.  PT IN ROOM RESTING     Nurse made aware: N/A     Patient Turned and repositioned: Independent     Patient Toileted: Independent     Fall Precautions in Place: Yellow non-skid socks on, Lighting appropriate, Room free of clutter and Clear path to bathroom         Electronically signed by Sabino Ratliff on 1/18/22 at 9:19 PM EST

## 2022-01-20 NOTE — BH NOTE
1/20/22 @ 15:30:  Continuity of care clinical faxed to [facility], [facility] ( [phone])
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
## 2022-03-03 ENCOUNTER — APPOINTMENT (OUTPATIENT)
Dept: GENERAL RADIOLOGY | Age: 32
DRG: 753 | End: 2022-03-03
Payer: MEDICARE

## 2022-03-03 ENCOUNTER — HOSPITAL ENCOUNTER (INPATIENT)
Age: 32
LOS: 4 days | Discharge: HOME | DRG: 753 | End: 2022-03-07
Attending: STUDENT IN AN ORGANIZED HEALTH CARE EDUCATION/TRAINING PROGRAM | Admitting: PSYCHIATRY & NEUROLOGY
Payer: MEDICARE

## 2022-03-03 DIAGNOSIS — Z00.8 ENCOUNTER FOR PSYCHOLOGICAL EVALUATION: Primary | ICD-10-CM

## 2022-03-03 PROBLEM — F29 PSYCHOSIS (HCC): Status: ACTIVE | Noted: 2022-03-03

## 2022-03-03 PROBLEM — F31.9 BIPOLAR 1 DISORDER (HCC): Status: ACTIVE | Noted: 2022-03-03

## 2022-03-03 LAB
A/G RATIO: 1.5 (ref 1.1–2.2)
ACETAMINOPHEN LEVEL: <5 UG/ML (ref 10–30)
ALBUMIN SERPL-MCNC: 3.9 G/DL (ref 3.4–5)
ALP BLD-CCNC: 71 U/L (ref 40–129)
ALT SERPL-CCNC: 26 U/L (ref 10–40)
AMPHETAMINE SCREEN, URINE: ABNORMAL
ANION GAP SERPL CALCULATED.3IONS-SCNC: 7 MMOL/L (ref 3–16)
AST SERPL-CCNC: 39 U/L (ref 15–37)
BARBITURATE SCREEN URINE: ABNORMAL
BASOPHILS ABSOLUTE: 0.1 K/UL (ref 0–0.2)
BASOPHILS RELATIVE PERCENT: 0.8 %
BENZODIAZEPINE SCREEN, URINE: ABNORMAL
BILIRUB SERPL-MCNC: 0.4 MG/DL (ref 0–1)
BUN BLDV-MCNC: 11 MG/DL (ref 7–20)
CALCIUM SERPL-MCNC: 8.8 MG/DL (ref 8.3–10.6)
CANNABINOID SCREEN URINE: POSITIVE
CHLORIDE BLD-SCNC: 103 MMOL/L (ref 99–110)
CO2: 28 MMOL/L (ref 21–32)
COCAINE METABOLITE SCREEN URINE: ABNORMAL
CREAT SERPL-MCNC: 0.9 MG/DL (ref 0.9–1.3)
EOSINOPHILS ABSOLUTE: 0.3 K/UL (ref 0–0.6)
EOSINOPHILS RELATIVE PERCENT: 3.3 %
ETHANOL: NORMAL MG/DL (ref 0–0.08)
GFR AFRICAN AMERICAN: >60
GFR NON-AFRICAN AMERICAN: >60
GLUCOSE BLD-MCNC: 90 MG/DL (ref 70–99)
HCT VFR BLD CALC: 35.9 % (ref 40.5–52.5)
HEMOGLOBIN: 12.3 G/DL (ref 13.5–17.5)
INFLUENZA A: NOT DETECTED
INFLUENZA B: NOT DETECTED
LYMPHOCYTES ABSOLUTE: 2.5 K/UL (ref 1–5.1)
LYMPHOCYTES RELATIVE PERCENT: 28 %
Lab: ABNORMAL
MCH RBC QN AUTO: 31.1 PG (ref 26–34)
MCHC RBC AUTO-ENTMCNC: 34.2 G/DL (ref 31–36)
MCV RBC AUTO: 90.8 FL (ref 80–100)
METHADONE SCREEN, URINE: ABNORMAL
MONOCYTES ABSOLUTE: 0.8 K/UL (ref 0–1.3)
MONOCYTES RELATIVE PERCENT: 9 %
NEUTROPHILS ABSOLUTE: 5.2 K/UL (ref 1.7–7.7)
NEUTROPHILS RELATIVE PERCENT: 58.9 %
OPIATE SCREEN URINE: ABNORMAL
OXYCODONE URINE: ABNORMAL
PDW BLD-RTO: 12.3 % (ref 12.4–15.4)
PH UA: 6.5
PHENCYCLIDINE SCREEN URINE: ABNORMAL
PLATELET # BLD: 220 K/UL (ref 135–450)
PMV BLD AUTO: 7.8 FL (ref 5–10.5)
POTASSIUM REFLEX MAGNESIUM: 3.8 MMOL/L (ref 3.5–5.1)
PROPOXYPHENE SCREEN: ABNORMAL
RBC # BLD: 3.96 M/UL (ref 4.2–5.9)
SALICYLATE, SERUM: <0.3 MG/DL (ref 15–30)
SARS-COV-2 RNA, RT PCR: NOT DETECTED
SODIUM BLD-SCNC: 138 MMOL/L (ref 136–145)
TOTAL PROTEIN: 6.5 G/DL (ref 6.4–8.2)
WBC # BLD: 8.9 K/UL (ref 4–11)

## 2022-03-03 PROCEDURE — 73502 X-RAY EXAM HIP UNI 2-3 VIEWS: CPT

## 2022-03-03 PROCEDURE — 1240000000 HC EMOTIONAL WELLNESS R&B

## 2022-03-03 PROCEDURE — 6370000000 HC RX 637 (ALT 250 FOR IP): Performed by: STUDENT IN AN ORGANIZED HEALTH CARE EDUCATION/TRAINING PROGRAM

## 2022-03-03 PROCEDURE — 85025 COMPLETE CBC W/AUTO DIFF WBC: CPT

## 2022-03-03 PROCEDURE — 80179 DRUG ASSAY SALICYLATE: CPT

## 2022-03-03 PROCEDURE — 87636 SARSCOV2 & INF A&B AMP PRB: CPT

## 2022-03-03 PROCEDURE — 83036 HEMOGLOBIN GLYCOSYLATED A1C: CPT

## 2022-03-03 PROCEDURE — 82077 ASSAY SPEC XCP UR&BREATH IA: CPT

## 2022-03-03 PROCEDURE — 99285 EMERGENCY DEPT VISIT HI MDM: CPT

## 2022-03-03 PROCEDURE — 80143 DRUG ASSAY ACETAMINOPHEN: CPT

## 2022-03-03 PROCEDURE — 84443 ASSAY THYROID STIM HORMONE: CPT

## 2022-03-03 PROCEDURE — 80307 DRUG TEST PRSMV CHEM ANLYZR: CPT

## 2022-03-03 PROCEDURE — 36415 COLL VENOUS BLD VENIPUNCTURE: CPT

## 2022-03-03 PROCEDURE — 80061 LIPID PANEL: CPT

## 2022-03-03 PROCEDURE — 80053 COMPREHEN METABOLIC PANEL: CPT

## 2022-03-03 RX ORDER — ACETAMINOPHEN 325 MG/1
650 TABLET ORAL ONCE
Status: COMPLETED | OUTPATIENT
Start: 2022-03-03 | End: 2022-03-03

## 2022-03-03 RX ORDER — OLANZAPINE 10 MG/1
10 INJECTION, POWDER, LYOPHILIZED, FOR SOLUTION INTRAMUSCULAR EVERY 8 HOURS PRN
Status: DISCONTINUED | OUTPATIENT
Start: 2022-03-03 | End: 2022-03-05

## 2022-03-03 RX ORDER — DIPHENHYDRAMINE HYDROCHLORIDE 50 MG/ML
50 INJECTION INTRAMUSCULAR; INTRAVENOUS EVERY 4 HOURS PRN
Status: DISCONTINUED | OUTPATIENT
Start: 2022-03-03 | End: 2022-03-07 | Stop reason: HOSPADM

## 2022-03-03 RX ORDER — OLANZAPINE 10 MG/1
10 TABLET ORAL EVERY 8 HOURS PRN
Status: DISCONTINUED | OUTPATIENT
Start: 2022-03-03 | End: 2022-03-05

## 2022-03-03 RX ORDER — HYDROXYZINE PAMOATE 50 MG/1
50 CAPSULE ORAL 3 TIMES DAILY PRN
Status: DISCONTINUED | OUTPATIENT
Start: 2022-03-03 | End: 2022-03-04 | Stop reason: SDUPTHER

## 2022-03-03 RX ORDER — ACETAMINOPHEN 325 MG/1
650 TABLET ORAL EVERY 4 HOURS PRN
Status: DISCONTINUED | OUTPATIENT
Start: 2022-03-03 | End: 2022-03-07 | Stop reason: HOSPADM

## 2022-03-03 RX ORDER — POLYETHYLENE GLYCOL 3350 17 G
2 POWDER IN PACKET (EA) ORAL
Status: DISCONTINUED | OUTPATIENT
Start: 2022-03-03 | End: 2022-03-04

## 2022-03-03 RX ORDER — NICOTINE 21 MG/24HR
1 PATCH, TRANSDERMAL 24 HOURS TRANSDERMAL DAILY
Status: DISCONTINUED | OUTPATIENT
Start: 2022-03-03 | End: 2022-03-07 | Stop reason: HOSPADM

## 2022-03-03 RX ORDER — MAGNESIUM HYDROXIDE/ALUMINUM HYDROXICE/SIMETHICONE 120; 1200; 1200 MG/30ML; MG/30ML; MG/30ML
30 SUSPENSION ORAL EVERY 6 HOURS PRN
Status: DISCONTINUED | OUTPATIENT
Start: 2022-03-03 | End: 2022-03-07 | Stop reason: HOSPADM

## 2022-03-03 RX ORDER — ZIPRASIDONE HYDROCHLORIDE 20 MG/1
20 CAPSULE ORAL ONCE
Status: COMPLETED | OUTPATIENT
Start: 2022-03-03 | End: 2022-03-03

## 2022-03-03 RX ORDER — IBUPROFEN 400 MG/1
400 TABLET ORAL EVERY 6 HOURS PRN
Status: DISCONTINUED | OUTPATIENT
Start: 2022-03-03 | End: 2022-03-07 | Stop reason: HOSPADM

## 2022-03-03 RX ADMIN — ACETAMINOPHEN 650 MG: 325 TABLET ORAL at 17:40

## 2022-03-03 RX ADMIN — ZIPRASIDONE HYDROCHLORIDE 20 MG: 20 CAPSULE ORAL at 17:40

## 2022-03-03 ASSESSMENT — ENCOUNTER SYMPTOMS
SHORTNESS OF BREATH: 0
ABDOMINAL PAIN: 0
COUGH: 0

## 2022-03-03 ASSESSMENT — PAIN SCALES - GENERAL: PAINLEVEL_OUTOF10: 5

## 2022-03-03 NOTE — ED PROVIDER NOTES
[address] ED  EMERGENCY DEPARTMENT ENCOUNTER      Pt Name: Melva Longoria  MRN: [id number]  [place] 1990  Date of evaluation: 3/3/2022  Provider: Ninette Mcburney, DO    CHIEF COMPLAINT       Chief Complaint   Patient presents with    Manic Behavior     patient states he is manic and has a hx of bipolar. States his medications are not working. Patient states he has been having high anxiety x4 days. Patient continues to ramble during triage with flight of thoughts and ideas. HISTORY OF PRESENT ILLNESS   (Location/Symptom, Timing/Onset, Context/Setting, Quality, Duration, Modifying Factors, Severity)  Note limiting factors. Melva Longoria is a 28 y.o. male who presents to the emergency department complaining of arrives complaining of anxiety, ray. Patient states that he is feeling extremely anxious is causing him to have thoughts of wanting to end his life. Patient denies specific plan but he is extremely tangential, with pressured speech. Does report that he has access to knives at home and has previously cut himself with suicide attempt. Patient otherwise denies chest pain shortness of breath jaw pain nausea vomiting headache neck or back pain. Is complaining of right-sided hip pain states that he rides bulls and fell off a bowl several weeks ago has had pain ever since is requesting x-ray. Nursing Notes were reviewed.     PAST MEDICAL HISTORY     Past Medical History:   Diagnosis Date    ADHD     Adult respiratory distress syndrome ([address]     ICU     Anxiety     Bipolar disorder     Cognitive disorder     Finger fracture, left 11/22/2017    GERD (gastroesophageal reflux disease)     Hiatal hernia 01/19/2018    Hypertension     Kidney stones     MR (mental retardation), moderate     Nausea and vomiting     Pneumococcal pneumonia ([address]     Seizures ([address]     pt denied any seizures ever         SURGICAL HISTORY       Past Surgical History:   Procedure Laterality Date    MOUTH SURGERY      MOUTH SURGERY           CURRENT MEDICATIONS       Previous Medications    CARIPRAZINE HCL (VRAYLAR) 3 MG CAPS CAPSULE    Take 1 capsule by mouth daily    LACTOBACILLUS (CULTURELLE) CAPSULE    Take 1 capsule by mouth daily    MELATONIN 5 MG TBDP DISINTEGRATING TABLET    Take 2 tablets by mouth nightly    PALIPERIDONE PALMITATE ER (INVEGA SUSTENNA) 234 MG/1.5ML MADDI IM INJECTION    Inject 234 mg into the muscle once for 1 dose    TRAZODONE (DESYREL) 50 MG TABLET    Take 1 tablet by mouth nightly as needed for Sleep       ALLERGIES     Gabapentin, Seroquel [quetiapine], and Trazodone and nefazodone    FAMILY HISTORY       Family History   Problem Relation Age of Onset    Depression Mother     Mental Illness Maternal Uncle     Arthritis Paternal Grandmother           SOCIAL HISTORY       Social History     Socioeconomic History    Marital status:      Spouse name: N/A    Number of children: 0    Years of education: 15    Highest education level: None   Occupational History    Occupation: works on farm     Comment: Farm work   Tobacco Use    Smoking status: Current Some Day Smoker     Packs/day: 0.50     Years: 10.00     Pack years: 5.00     Types: Cigarettes    Smokeless tobacco: Current User     Types: Chew   Vaping Use    Vaping Use: Some days    Substances: Nicotine, THC, Flavoring    Devices: Pre-filled or refillable cartridge   Substance and Sexual Activity    Alcohol use: Not Currently     Alcohol/week: 2.0 standard drinks     Types: 2 Cans of beer per week     Comment: Occasional, \"2x last month\"    Drug use: Yes     Types: Marijuana Gelene Chasten)    Sexual activity: Yes     Partners: Female   Other Topics Concern    None   Social History Narrative    None     Social Determinants of Health     Financial Resource Strain:     Difficulty of Paying Living Expenses: Not on file   Food Insecurity:     Worried About Running Out of Food in the Last Year: Not on file    [address] in the Last Year: Not on file   Transportation Needs:     Lack of Transportation (Medical): Not on file    Lack of Transportation (Non-Medical): Not on file   Physical Activity:     Days of Exercise per Week: Not on file    Minutes of Exercise per Session: Not on file   Stress:     Feeling of Stress : Not on file   Social Connections:     Frequency of Communication with Friends and Family: Not on file    Frequency of Social Gatherings with Friends and Family: Not on file    Attends [employer] Services: Not on file    Active Member of [address] [place] [address] [employer] or Organizations: Not on file    Attends Club or Organization Meetings: Not on file    Marital Status: Not on file   Intimate Partner Violence:     Fear of Current or Ex-Partner: Not on file    Emotionally Abused: Not on file    Physically Abused: Not on file    Sexually Abused: Not on file   Housing Stability:     Unable to Pay for Housing in the Last Year: Not on file    Number of [place] in the Last Year: Not on file    Unstable Housing in the Last Year: Not on file       SCREENINGS        [place] Coma Scale  Eye Opening: Spontaneous  Best Verbal Response: Oriented  Best Motor Response: Obeys commands  [place] Coma Scale Score: 15                   REVIEW OF SYSTEMS    (2-9 systems for level 4, 10 or more for level 5)   Review of Systems   Constitutional: Negative. HENT: Negative. Respiratory: Negative for cough and shortness of breath. Cardiovascular: Negative for chest pain and leg swelling. Gastrointestinal: Negative for abdominal pain. Musculoskeletal: Positive for arthralgias. Neurological: Negative for headaches. Psychiatric/Behavioral: Positive for suicidal ideas. The patient is nervous/anxious.           PHYSICAL EXAM    (up to 7 for level 4, 8 or more for level 5)   RECENT VITALS:     Temp: 97.6 °F (36.4 °C),  Pulse: 88, Resp: 18, BP: 132/76, SpO2: 98 %    Physical Exam  Constitutional:       General: He is not in acute distress. Appearance: He is not diaphoretic. HENT:      Head: Normocephalic and atraumatic. Eyes:      Pupils: Pupils are equal, round, and reactive to light. Neck:      Trachea: No tracheal deviation. Cardiovascular:      Rate and Rhythm: Normal rate and regular rhythm. Pulmonary:      Effort: Pulmonary effort is normal. No respiratory distress. Breath sounds: No stridor. No wheezing. Abdominal:      General: There is no distension. Palpations: Abdomen is soft. Tenderness: There is no abdominal tenderness. Musculoskeletal:         General: No deformity. Normal range of motion. Cervical back: Normal range of motion and neck supple. Skin:     General: Skin is warm and dry. Neurological:      Mental Status: He is alert and oriented to person, place, and time. Psychiatric:         Speech: Speech is rapid and pressured and tangential.         DIAGNOSTIC RESULTS     EKG: All EKG's are interpreted by the Emergency Department Physician who either signs or Co-signs this chart in the absence of a cardiologist.    RADIOLOGY:   Non-plain film images such as CT, Ultrasound and MRI are read by the radiologist. Plain radiographic images are visualized and preliminarily interpreted by the emergency physician. Interpretation per the Radiologist below, if available at the time of this note:    XR HIP RIGHT (2-3 VIEWS)   Final Result   Mild osteoarthritic changes in both hips and probable small bone island in   the right femoral neck which is unchanged with no acute abnormality seen.                LABS:  Labs Reviewed   CBC WITH AUTO DIFFERENTIAL - Abnormal; Notable for the following components:       Result Value    RBC 3.96 (*)     Hemoglobin 12.3 (*)     Hematocrit 35.9 (*)     RDW 12.3 (*)     All other components within normal limits   COMPREHENSIVE METABOLIC PANEL W/ REFLEX TO MG FOR LOW K - Abnormal; Notable for the following components:    AST 39 (*)     All other components within normal limits   URINE DRUG SCREEN - Abnormal; Notable for the following components:    Cannabinoid Scrn, Ur POSITIVE (*)     All other components within normal limits   ACETAMINOPHEN LEVEL - Abnormal; Notable for the following components:    Acetaminophen Level <5 (*)     All other components within normal limits   SALICYLATE LEVEL - Abnormal; Notable for the following components:    Salicylate, Serum <0.1 (*)     All other components within normal limits   COVID-19 & INFLUENZA COMBO    Narrative:     ORDER WAS CANCELLED 03/03/2022 17:21, CVFLU requested/collected. ETHANOL   LIPID PANEL   HEMOGLOBIN A1C   TSH       All other labs were within normal range or not returned as of this dictation. EMERGENCY DEPARTMENT COURSE and DIFFERENTIAL DIAGNOSIS/MDM:   Joaquim Huffman is a 28 y.o. male who presents to the emergency department with the complaint of arrives for psychiatric evaluation. Patient with tangential and pressured speech. Does report suicidal ideation due to feeling extremely anxious. Will treat with oral Geodon. Vitals are stable. His only other medical complaint was right hip pain reports a fall several weeks ago imaging was obtained and was negative. Labs reviewed no significant abnormality, patient medically cleared will be evaluated by psychiatry. Patient medically cleared. Patient admitted to inpatient psych. CRITICAL CARE TIME   Total Critical Care time was complete minutes, excluding separately reportable procedures. There was a high probability of clinically significant/life threatening deterioration in the patient's condition which required my urgent intervention. Clinical concern 0  Intervention    CONSULTS:  IP CONSULT TO HOSPITALIST    PROCEDURES:  Unless otherwise noted below, none     Procedures        FINAL IMPRESSION      1.  Encounter for psychological evaluation          DISPOSITION/PLAN   [address] [facility],[address] [place] [address] 03/03/2022 06:15:09 PM      PATIENT REFERRED TO:  No follow-up provider specified. DISCHARGE MEDICATIONS:  New Prescriptions    No medications on file     Controlled Substances Monitoring:     RX Monitoring 11/30/2018   Attestation The Prescription Monitoring Report for this patient was reviewed today. Periodic Controlled Substance Monitoring Possible medication side effects, risk of tolerance/dependence & alternative treatments discussed. ;No signs of potential drug abuse or diversion identified.        (Please note that portions of this note were completed with a voice recognition program.  Efforts were made to edit the dictations but occasionally words are mis-transcribed.)    Isela Kern DO (electronically signed)  Attending Emergency Physician            Isela Kern DO  03/03/22 1764

## 2022-03-03 NOTE — ED NOTES
Pt brought to the [facility]. Changed into safety gown, urine, blood and covid test sent to lab. Pt extremely manic. Loud, pressured speech. Tangential. Constantly moving and figgiting. Currently laying in bed, flipping his legs back and forth.       Justice Scott RN  03/03/22 6086

## 2022-03-03 NOTE — ED NOTES
Presenting Problem:Patient presents to the ED voluntarily after having suicidal thoughts with a plan and intention. Patient states he came in today with the encouragement of his mother, after telling her his plan to slit his wrist or blow his head off. He states they have multiple guns at home. He states he has been stressed more than normal.  He is going though a divorce and his father has been in the hospital battling covid for weeks now and is not doing well. Patient states he is in a manic state and he cant not stop. He states he has been like this for 4 days and he needs help. Now all he can think about is killing himself. Patient is unable to contract for safety is he were to go home tonight. Patient's speech is tangential and pressured. He has been unable to stay focused and answer questions appropriately. Patient has been polite and accepts redirection. Appearance/Hygiene:  hospital attire, fair grooming and fair hygiene   Motor Behavior: Hyper    Attitude: distracted, patient has extreme difficulty staying focused and answering questions. Affect: anxiety   Speech: loud and pressured  Mood: anxious   Thought Processes: clear, although thoughts are racing and patient is unable to stay focused. Perceptions: Absent   Thought content: Clear   Orientation: A&Ox4   Memory: intact  Concentration: Poor    Insight/ judgement: impaired judgment and insight. Psychosocial and contextual factors: Pt lives with his mother, in the mist of a divorce and his father has been in the hospital with covid for the past 90 days, not doing well. C-SSRS flowsheet is  Complete. Psychiatric History (including current outpatient provider and past inpatient admissions): Yes. Has been admitted several times different places. Has been diagnosed with PTSD and bipolar. [facility], comes to his house twice weekly, and he receives his Invega injections months.       Access to Firearms: Yes.  They have multiple guns at home. ASSESSMENT FOR IMMINENT FUTURE DANGER:    RISK FACTORS:    []  Age <25 or >49   []  Male gender   []  Depressed mood   [x]  Active suicidal ideation   [x]  Suicide plan   []  Suicide attempt   [x]  Access to lethal means   [x]  Prior suicide attempt   []  Active substance abuse    [x]  Highly impulsive behaviors   []  Not attending to self-care/ADLs    [x]  Recent significant loss   []  Chronic pain or medical illness   []  Social isolation   []  History of violence   []  Active psychosis   []  Cognitive impairment    []  No outpatient services in place   []  Medication noncompliance   [x]  No collateral information to support safety  [x] Self- injurious/ Self-harm behavior. Has tried cutting recently.      PROTECTIVE FACTORS:  [] Age >25 and <55  [] Female gender   [] Denies depression  [] Denies suicidal ideation  [] Does not have lethal plan   [] Does not have access to guns or weapons  [] Patient is verbally yany for safety  [] No prior suicide attempts  [] No active substance abuse  [x] Patient has social or family support  [] No active psychosis or cognitive dysfunction  [x] Physically healthy  [x] Has outpatient services in place  [x] Compliant with recommended medications             Lina Orozco RN  03/03/22 64 Messi Alegria RN  03/03/22 1380

## 2022-03-03 NOTE — ED NOTES
Pt presents with manic behavior, as evidenced by, rapid pressured speech, tangential thoughts, and inability to sit still. Pt is very polite but intrusive with staff and other patients. Accepts redirection and boundaries but needs frequent reminders. \" I remember you. Do you remember me? I haven't seen you since 2009. 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 Remember my cat, Raymond Salvador? I met my first wife here on the  psych unit and then my second wife in outpatient. I have learned my lesson not to meet someone on the psych unit, but hey, everyone needs love. I mean I was celibate for 3 years. No friends with benefits or anything. I'm a vegan now. Can I have a turkey sandwich? I also need tyelnol, geodon, nicotine patch, chocolate milk, and a soda, thank you. It's great to see you again\".       Tiffanie Macias RN  03/03/22 0428

## 2022-03-04 PROBLEM — D64.9 ANEMIA: Status: ACTIVE | Noted: 2022-03-04

## 2022-03-04 LAB
CHOLESTEROL, TOTAL: 176 MG/DL (ref 0–199)
HDLC SERPL-MCNC: 64 MG/DL (ref 40–60)
LDL CHOLESTEROL CALCULATED: 89 MG/DL
TRIGL SERPL-MCNC: 114 MG/DL (ref 0–150)
TSH SERPL DL<=0.05 MIU/L-ACNC: 0.28 UIU/ML (ref 0.27–4.2)
VLDLC SERPL CALC-MCNC: 23 MG/DL

## 2022-03-04 PROCEDURE — 6370000000 HC RX 637 (ALT 250 FOR IP): Performed by: PSYCHIATRY & NEUROLOGY

## 2022-03-04 PROCEDURE — 6370000000 HC RX 637 (ALT 250 FOR IP): Performed by: STUDENT IN AN ORGANIZED HEALTH CARE EDUCATION/TRAINING PROGRAM

## 2022-03-04 PROCEDURE — 6360000002 HC RX W HCPCS: Performed by: PSYCHIATRY & NEUROLOGY

## 2022-03-04 PROCEDURE — 1240000000 HC EMOTIONAL WELLNESS R&B

## 2022-03-04 PROCEDURE — 99221 1ST HOSP IP/OBS SF/LOW 40: CPT | Performed by: PHYSICIAN ASSISTANT

## 2022-03-04 PROCEDURE — 6370000000 HC RX 637 (ALT 250 FOR IP)

## 2022-03-04 RX ORDER — POLYETHYLENE GLYCOL 3350 17 G
4 POWDER IN PACKET (EA) ORAL
Status: DISCONTINUED | OUTPATIENT
Start: 2022-03-04 | End: 2022-03-07 | Stop reason: HOSPADM

## 2022-03-04 RX ORDER — HYDROXYZINE 50 MG/1
50 TABLET, FILM COATED ORAL 3 TIMES DAILY PRN
Status: DISCONTINUED | OUTPATIENT
Start: 2022-03-04 | End: 2022-03-07 | Stop reason: HOSPADM

## 2022-03-04 RX ORDER — ZIPRASIDONE MESYLATE 20 MG/ML
20 INJECTION, POWDER, LYOPHILIZED, FOR SOLUTION INTRAMUSCULAR ONCE
Status: COMPLETED | OUTPATIENT
Start: 2022-03-04 | End: 2022-03-04

## 2022-03-04 RX ORDER — ASCORBIC ACID 500 MG
1000 TABLET ORAL DAILY
Status: ON HOLD | COMMUNITY
End: 2022-03-07 | Stop reason: HOSPADM

## 2022-03-04 RX ORDER — LORAZEPAM 1 MG/1
1 TABLET ORAL DAILY
Status: ON HOLD | COMMUNITY
End: 2022-03-07 | Stop reason: HOSPADM

## 2022-03-04 RX ORDER — MELOXICAM 15 MG/1
7.5 TABLET ORAL DAILY
Status: DISCONTINUED | OUTPATIENT
Start: 2022-03-04 | End: 2022-03-07 | Stop reason: HOSPADM

## 2022-03-04 RX ORDER — AMANTADINE HYDROCHLORIDE 100 MG/1
100 CAPSULE, GELATIN COATED ORAL DAILY
Status: ON HOLD | COMMUNITY
End: 2022-03-07 | Stop reason: HOSPADM

## 2022-03-04 RX ORDER — LORAZEPAM 2 MG/ML
1 INJECTION INTRAMUSCULAR ONCE
Status: COMPLETED | OUTPATIENT
Start: 2022-03-04 | End: 2022-03-04

## 2022-03-04 RX ADMIN — ACETAMINOPHEN 650 MG: 325 TABLET ORAL at 06:54

## 2022-03-04 RX ADMIN — ACETAMINOPHEN 650 MG: 325 TABLET ORAL at 10:57

## 2022-03-04 RX ADMIN — OLANZAPINE 10 MG: 10 TABLET, FILM COATED ORAL at 05:05

## 2022-03-04 RX ADMIN — ASPIRIN 4 MG: 325 TABLET, COATED ORAL at 22:16

## 2022-03-04 RX ADMIN — NICOTINE POLACRILEX 2 MG: 2 LOZENGE ORAL at 06:46

## 2022-03-04 RX ADMIN — HYDROXYZINE HYDROCHLORIDE 50 MG: 50 TABLET, FILM COATED ORAL at 22:32

## 2022-03-04 RX ADMIN — ZIPRASIDONE MESYLATE 20 MG: 20 INJECTION, POWDER, LYOPHILIZED, FOR SOLUTION INTRAMUSCULAR at 13:22

## 2022-03-04 RX ADMIN — ACETAMINOPHEN 650 MG: 325 TABLET ORAL at 00:20

## 2022-03-04 RX ADMIN — NICOTINE POLACRILEX 4 MG: 4 GUM, CHEWING BUCCAL at 11:44

## 2022-03-04 RX ADMIN — MELOXICAM 7.5 MG: 15 TABLET ORAL at 11:44

## 2022-03-04 RX ADMIN — ACETAMINOPHEN 650 MG: 325 TABLET ORAL at 21:08

## 2022-03-04 RX ADMIN — IBUPROFEN 400 MG: 400 TABLET, FILM COATED ORAL at 23:58

## 2022-03-04 RX ADMIN — NICOTINE POLACRILEX 2 MG: 2 LOZENGE ORAL at 04:50

## 2022-03-04 RX ADMIN — NICOTINE POLACRILEX 2 MG: 2 LOZENGE ORAL at 00:20

## 2022-03-04 RX ADMIN — LORAZEPAM 1 MG: 2 INJECTION INTRAMUSCULAR; INTRAVENOUS at 13:22

## 2022-03-04 RX ADMIN — HYDROXYZINE PAMOATE 50 MG: 50 CAPSULE ORAL at 08:42

## 2022-03-04 RX ADMIN — ASPIRIN 4 MG: 325 TABLET, COATED ORAL at 20:11

## 2022-03-04 RX ADMIN — NICOTINE POLACRILEX 2 MG: 2 LOZENGE ORAL at 10:57

## 2022-03-04 RX ADMIN — NICOTINE POLACRILEX 2 MG: 2 LOZENGE ORAL at 08:42

## 2022-03-04 SDOH — ECONOMIC STABILITY: FOOD INSECURITY: WITHIN THE PAST 12 MONTHS, YOU WORRIED THAT YOUR FOOD WOULD RUN OUT BEFORE YOU GOT MONEY TO BUY MORE.: NEVER TRUE

## 2022-03-04 SDOH — HEALTH STABILITY: PHYSICAL HEALTH: ON AVERAGE, HOW MANY MINUTES DO YOU ENGAGE IN EXERCISE AT THIS LEVEL?: 0 MIN

## 2022-03-04 SDOH — SOCIAL STABILITY: SOCIAL NETWORK: HOW OFTEN DO YOU GET TOGETHER WITH FRIENDS OR RELATIVES?: MORE THAN THREE TIMES A WEEK

## 2022-03-04 SDOH — HEALTH STABILITY: MENTAL HEALTH
STRESS IS WHEN SOMEONE FEELS TENSE, NERVOUS, ANXIOUS, OR CAN'T SLEEP AT NIGHT BECAUSE THEIR MIND IS TROUBLED. HOW STRESSED ARE YOU?: TO SOME EXTENT

## 2022-03-04 SDOH — ECONOMIC STABILITY: TRANSPORTATION INSECURITY
IN THE PAST 12 MONTHS, HAS THE LACK OF TRANSPORTATION KEPT YOU FROM MEDICAL APPOINTMENTS OR FROM GETTING MEDICATIONS?: YES

## 2022-03-04 SDOH — ECONOMIC STABILITY: INCOME INSECURITY: IN THE LAST 12 MONTHS, WAS THERE A TIME WHEN YOU WERE NOT ABLE TO PAY THE MORTGAGE OR RENT ON TIME?: NO

## 2022-03-04 SDOH — SOCIAL STABILITY: SOCIAL INSECURITY: WITHIN THE LAST YEAR, HAVE YOU BEEN HUMILIATED OR EMOTIONALLY ABUSED IN OTHER WAYS BY YOUR PARTNER OR EX-PARTNER?: YES

## 2022-03-04 SDOH — HEALTH STABILITY: PHYSICAL HEALTH: ON AVERAGE, HOW MANY DAYS PER WEEK DO YOU ENGAGE IN MODERATE TO STRENUOUS EXERCISE (LIKE A BRISK WALK)?: 0 DAYS

## 2022-03-04 SDOH — ECONOMIC STABILITY: FOOD INSECURITY: WITHIN THE PAST 12 MONTHS, THE FOOD YOU BOUGHT JUST DIDN'T LAST AND YOU DIDN'T HAVE MONEY TO GET MORE.: NEVER TRUE

## 2022-03-04 SDOH — SOCIAL STABILITY: SOCIAL INSECURITY
WITHIN THE LAST YEAR, HAVE YOU BEEN KICKED, HIT, SLAPPED, OR OTHERWISE PHYSICALLY HURT BY YOUR PARTNER OR EX-PARTNER?: NO

## 2022-03-04 SDOH — HEALTH STABILITY: MENTAL HEALTH: HOW OFTEN DO YOU HAVE A DRINK CONTAINING ALCOHOL?: NEVER

## 2022-03-04 SDOH — SOCIAL STABILITY: SOCIAL NETWORK: ARE YOU MARRIED, WIDOWED, DIVORCED, SEPARATED, NEVER MARRIED, OR LIVING WITH A PARTNER?: SEPARATED

## 2022-03-04 SDOH — SOCIAL STABILITY: SOCIAL NETWORK: HOW OFTEN DO YOU ATTEND CHURCH OR RELIGIOUS SERVICES?: NEVER

## 2022-03-04 SDOH — SOCIAL STABILITY: SOCIAL NETWORK: HOW OFTEN DO YOU ATTENT MEETINGS OF THE CLUB OR ORGANIZATION YOU BELONG TO?: NEVER

## 2022-03-04 SDOH — SOCIAL STABILITY: SOCIAL INSECURITY
WITHIN THE LAST YEAR, HAVE TO BEEN RAPED OR FORCED TO HAVE ANY KIND OF SEXUAL ACTIVITY BY YOUR PARTNER OR EX-PARTNER?: NO

## 2022-03-04 SDOH — ECONOMIC STABILITY: HOUSING INSECURITY
IN THE LAST 12 MONTHS, WAS THERE A TIME WHEN YOU DID NOT HAVE A STEADY PLACE TO SLEEP OR SLEPT IN A SHELTER (INCLUDING NOW)?: NO

## 2022-03-04 SDOH — ECONOMIC STABILITY: INCOME INSECURITY: HOW HARD IS IT FOR YOU TO PAY FOR THE VERY BASICS LIKE FOOD, HOUSING, MEDICAL CARE, AND HEATING?: NOT HARD AT ALL

## 2022-03-04 SDOH — SOCIAL STABILITY: SOCIAL INSECURITY: WITHIN THE LAST YEAR, HAVE YOU BEEN AFRAID OF YOUR PARTNER OR EX-PARTNER?: NO

## 2022-03-04 SDOH — ECONOMIC STABILITY: TRANSPORTATION INSECURITY
IN THE PAST 12 MONTHS, HAS LACK OF TRANSPORTATION KEPT YOU FROM MEETINGS, WORK, OR FROM GETTING THINGS NEEDED FOR DAILY LIVING?: YES

## 2022-03-04 SDOH — SOCIAL STABILITY: SOCIAL NETWORK
IN A TYPICAL WEEK, HOW MANY TIMES DO YOU TALK ON THE PHONE WITH FAMILY, FRIENDS, OR NEIGHBORS?: MORE THAN THREE TIMES A WEEK

## 2022-03-04 SDOH — SOCIAL STABILITY: SOCIAL NETWORK
DO YOU BELONG TO ANY CLUBS OR ORGANIZATIONS SUCH AS CHURCH GROUPS UNIONS, FRATERNAL OR ATHLETIC GROUPS, OR SCHOOL GROUPS?: NO

## 2022-03-04 ASSESSMENT — PAIN SCALES - GENERAL
PAINLEVEL_OUTOF10: 8
PAINLEVEL_OUTOF10: 6
PAINLEVEL_OUTOF10: 0
PAINLEVEL_OUTOF10: 0
PAINLEVEL_OUTOF10: 5
PAINLEVEL_OUTOF10: 10
PAINLEVEL_OUTOF10: 7
PAINLEVEL_OUTOF10: 10

## 2022-03-04 ASSESSMENT — SLEEP AND FATIGUE QUESTIONNAIRES
DO YOU HAVE DIFFICULTY SLEEPING: NO
AVERAGE NUMBER OF SLEEP HOURS: 5
DO YOU USE A SLEEP AID: NO

## 2022-03-04 ASSESSMENT — LIFESTYLE VARIABLES: HISTORY_ALCOHOL_USE: NO

## 2022-03-04 NOTE — GROUP NOTE
Group Therapy Note    Date: 3/4/2022    Group Start Time: 1000  Group End Time: 4683  Group Topic: Recreational    [address] [facility] [address] [facility] [address]        Group Therapy Note    Attendees: 11    Group members engaged in a \"This or That\" ice breaker activity and broke up into teams to [address]zman Notes:  Norman attended group for the full duration. Norman and another member of the group engaged in side conversation. Was redirected, apologized, and went back to side conversation.      Status After Intervention:  Improved    Participation Level: Monopolizing    Participation Quality: Appropriate      Speech:  pressured      Thought Process/Content: Flight of Ideas     Affective Functioning: Exaggerated      Mood: elevated      Level of consciousness:  Alert, Oriented x4 and Attentive      Response to Learning: Able to verbalize current knowledge/experience      Endings: None Reported    Modes of Intervention: Socialization and Activity      Discipline Responsible: Behavorial Health Tech      Signature:  ALBERTO Lu

## 2022-03-04 NOTE — ED NOTES
Patient reports his pain is down to a 7. Patient has new order for Mobic. Mobic given po per order see MAR.

## 2022-03-04 NOTE — PROGRESS NOTES
Call placed to [facility] at [phone]. They will fax over med list to [phone].

## 2022-03-04 NOTE — H&P
Hospital Medicine History & Physical      PCP: Madelyn Kee, APRN - CNP    Date of Admission: 3/3/2022    Date of Service: Pt seen/examined on 3/4/2022     Chief Complaint:    Chief Complaint   Patient presents with    Manic Behavior     patient states he is manic and has a hx of bipolar. States his medications are not working. Patient states he has been having high anxiety x4 days. Patient continues to ramble during triage with flight of thoughts and ideas. History Of Present Illness: The patient is a 28 y.o. male with bipolar do, autism who presented to [facility] ER for complaint of manic behavior. Patient was seen and evaluated in the ED by the ED medical provider, patient was medically cleared for admission to [facility] at [facility]. This note serves as an admission medical H&P. Tobacco use:  yes  ETOH use:  yes  Illicit drug use:  UDS+ cannabinoid     Patient denies any medical complaints     Past Medical History:        Diagnosis Date    ADHD     Adult respiratory distress syndrome ([facility]     ICU     Anxiety     Bipolar disorder     Cognitive disorder     Finger fracture, left 11/22/2017    GERD (gastroesophageal reflux disease)     Hiatal hernia 01/19/2018    Hypertension     Kidney stones     MR (mental retardation), moderate     Nausea and vomiting     Pneumococcal pneumonia (HCC)     Seizures ([address]     pt denied any seizures ever       Past Surgical History:        Procedure Laterality Date    MOUTH SURGERY      MOUTH SURGERY         Medications Prior to Admission:    Prior to Admission medications    Medication Sig Start Date End Date Taking? Authorizing Provider   amantadine (SYMMETREL) 100 MG capsule Take 100 mg by mouth daily   Yes Historical Provider, MD   LORazepam (ATIVAN) 1 MG tablet Take 1 mg by mouth daily. One tablet daily for 15 days.   Started on 3/1/22    Historical Provider, MD   ascorbic acid (QC VITAMIN C WITH ISSA HIPS) 500 MG tablet Take 1,000 mg by mouth daily Historical Provider, MD   paliperidone palmitate ER (INVEGA SUSTENNA) 234 MG/1.5ML MADDI IM injection Inject 234 mg into the muscle once for 1 dose 1/25/22 1/25/22  Jimbo Aguirre MD   traZODone (DESYREL) 50 MG tablet Take 1 tablet by mouth nightly as needed for Sleep 1/19/22   Jimbo Aguirre MD   lactobacillus (CULTURELLE) capsule Take 1 capsule by mouth daily 1/19/22   Jimbo Aguirre MD   cariprazine hcl (VRAYLAR) 3 MG CAPS capsule Take 1 capsule by mouth daily 1/19/22   Jimbo Aguirre MD   melatonin 5 MG TBDP disintegrating tablet Take 2 tablets by mouth nightly 1/18/22   Jimbo Aguirre MD       Allergies:  Gabapentin, Seroquel [quetiapine], and Trazodone and nefazodone    Social History:  The patient currently lives at home with his parents     TOBACCO:   reports that he has been smoking cigarettes. He has a 5.00 pack-year smoking history. His smokeless tobacco use includes chew. ETOH:   reports previous alcohol use. Family History:   Positive as follows:        Problem Relation Age of Onset    Depression Mother     Mental Illness Maternal Uncle     Arthritis Paternal Grandmother        REVIEW OF SYSTEMS:       Constitutional: Negative for fever   HENT: Negative for sore throat   Eyes: Negative for redness   Respiratory: Negative  for dyspnea, cough   Cardiovascular: Negative for chest pain   Gastrointestinal: Negative for vomiting, diarrhea   Genitourinary: Negative for hematuria   Musculoskeletal: Negative for arthralgias   Skin: Negative for rash   Neurological: Negative for syncope    Hematological: Negative for easy bruising/bleeding   Psychiatric/Behavorial: Per psychiatry team evaluation     PHYSICAL EXAM:    /79   Pulse 81   Temp 97.3 °F (36.3 °C) (Temporal)   Resp 18   Wt 190 lb (86.2 kg)   SpO2 99%   BMI 25.77 kg/m²     Gen: No distress. Alert. Eyes: PERRL. No sclera icterus. No conjunctival injection. ENT: No discharge. Pharynx clear. Neck: No JVD. Trachea midline. Resp: No accessory muscle use. No crackles. No wheezes. No rhonchi. CV: Regular rate. Regular rhythm. No murmur. No rub. No edema. GI: Non-tender. Non-distended. Normal bowel sounds. Skin: Warm and dry. No nodule on exposed extremities. No rash on exposed extremities. M/S: No cyanosis. No joint deformity. No clubbing. Neuro: Awake. No focal neurologic deficit on exam.  Cranial nerves II through XII intact. Patient is able to ambulate without difficulty. Psych: Per psychiatry team evaluation     CBC:   Recent Labs     03/03/22  1700   WBC 8.9   HGB 12.3*   HCT 35.9*   MCV 90.8        BMP:   Recent Labs     03/03/22  1700      K 3.8      CO2 28   BUN 11   CREATININE 0.9     LIVER PROFILE:   Recent Labs     03/03/22  1700   AST 39*   ALT 26   BILITOT 0.4   ALKPHOS 71     PT/INR: No results for input(s): PROTIME, INR in the last 72 hours. APTT: No results for input(s): APTT in the last 72 hours. UA:  Recent Labs     03/03/22  1640   PHUR 6.5          U/A:    Lab Results   Component Value Date    NITRITE Neg 06/18/2014    COLORU Yellow 01/12/2022    WBCUA 3-5 05/09/2016    RBCUA 0-2 05/09/2016    MUCUS 1+ 05/09/2016    BACTERIA 1+ 05/09/2016    CLARITYU Clear 01/12/2022    SPECGRAV <=1.005 01/12/2022    LEUKOCYTESUR Negative 01/12/2022    BLOODU Negative 01/12/2022    GLUCOSEU Negative 01/12/2022    GLUCOSEU NEGATIVE 02/11/2010       RADIOLOGY  XR HIP RIGHT (2-3 VIEWS)   Final Result   Mild osteoarthritic changes in both hips and probable small bone island in   the right femoral neck which is unchanged with no acute abnormality seen.            ASSESSMENT/PLAN:  #Bipolar DO  #Autism spectrum DO  - per psychiatry team    #Anemia  - He is taking iron supplement at home, he also endorses a vegan diet   - mildly low hb, will defer follow up to PCP for repeat CBC and further eval  - patient informed and will see PCP in follow up     #Tobacco Dependence  #Marijuana abuse  -Recommended cessation  - nicotine patch ordered     Lacy Estevez PA-C  3/4/2022 12:49 PM

## 2022-03-04 NOTE — PROGRESS NOTES
Pt arrived at 2300 via [facility] staff. Pt is manic, has trouble concentrating and staying on track. Friendly and cooperative. Pt reports that he is not suicidal, that he is here for \"rest, reserve, and relaxation\", stating that he \"needed a vacation\". Pt slept a few hours after admission process. He woke up exit seeking, pressing on doors, wanting to leave. Pt is disorganized and labile. Monitored for safety and comfort.

## 2022-03-04 NOTE — CARE COORDINATION
[facility]  Treatment Team Note  Day 1    Review Date & Time: 0900  3/4/2022    Patient was not in treatment team      Status EXAM:   Status and Exam  Normal:  (Patient is unable to assess. He was given medication to help him sleep due to the ray.)  Facial Expression:  (Patient is unable to assess. He was given medication to help him sleep due to the ray.)  Affect:  (Patient is unable to assess. He was given medication to help him sleep due to the ray.)  Level of Consciousness:  (Patient is unable to assess. He was given medication to help him sleep due to the ray.)  Mood:Normal:  (Patient is unable to assess. He was given medication to help him sleep due to the ray.)  Motor Activity:Normal:  (Patient is unable to assess. He was given medication to help him sleep due to the ray.)  Motor Activity:  (Patient is unable to assess. He was given medication to help him sleep due to the ray.)  Interview Behavior:  (Patient is unable to assess. He was given medication to help him sleep due to the ray.)  Preception:  (Patient is unable to assess. He was given medication to help him sleep due to the ray.)  Attention:Normal:  (Patient is unable to assess. He was given medication to help him sleep due to the ray.)  Attention:  (Patient is unable to assess. He was given medication to help him sleep due to the ray.)  Thought Processes:  (Patient is unable to assess. He was given medication to help him sleep due to the ray.)  Thought Content:Normal:  (Patient is unable to assess. He was given medication to help him sleep due to the ray.)  Hallucinations:  (Patient is unable to assess. He was given medication to help him sleep due to the ray.)  Delusions:  (Patient is unable to assess. He was given medication to help him sleep due to the ray.)  Memory:Normal:  (Patient is unable to assess.  He was given medication to help him sleep due to the ray.)  Memory:  (Patient is unable to assess. He was given medication to help him sleep due to the ray.)  Insight and Judgment:  (Patient is unable to assess. He was given medication to help him sleep due to the ray.)  Insight and Judgment:  (Patient is unable to assess. He was given medication to help him sleep due to the ray.)  Present Suicidal Ideation:  (Patient is unable to assess. He was given medication to help him sleep due to the ray.)  Present Homicidal Ideation:  (Patient is unable to assess. He was given medication to help him sleep due to the ray.)      Suicide Risk CSSR-S:  1) Within the past month, have you wished you were dead or wished you could go to sleep and not wake up? : Yes  2) Have you actually had any thoughts of killing yourself? : Yes  3) Have you been thinking about how you might kill yourself? : Yes  5) Have you started to work out or worked out the details of how to kill yourself? Do you intend to carry out this plan? : No  6) Have you ever done anything, started to do anything, or prepared to do anything to end your life?: No      PLAN/TREATMENT RECOMMENDATIONS UPDATE: Patient will take medication as prescribed, eat 75% of meals, attend groups, participate in milieu activities, participate in treatment team and care planning for discharge and follow up.       Abraham Lu RN
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
## 2022-03-04 NOTE — PROGRESS NOTES
Behavioral Services  Medicare Certification Upon Admission    I certify that this patient's inpatient psychiatric hospital admission is medically necessary for:    [x] (1) Treatment which could reasonably be expected to improve this patient's condition,       [x] (2) Or for diagnostic study;     AND     [x](2) The inpatient psychiatric services are provided while the individual is under the care of a physician and are included in the individualized plan of care.     Estimated length of stay/service 5-7 d    Plan for post-hospital care outpt    Electronically signed by Ernestina Miller MD on 3/4/2022 at 11:01 AM

## 2022-03-04 NOTE — PROGRESS NOTES
[facility]  Admission Note     Admission Type:   Admission Type: Voluntary    Reason for admission:  Reason for Admission: SI, ray    PATIENT STRENGTHS:  Strengths: Communication    Patient Strengths and Limitations:  Limitations: Unrealistic self-view,Difficult relationships / poor social skills,Difficulty problem solving/relies on others to help solve problems    Addictive Behavior:   Addictive Behavior  In the past 3 months, have you felt or has someone told you that you have a problem with:  : None  Do you have a history of Chemical Use?: No  Do you have a history of Alcohol Use?: No  Do you have a history of Street Drug Abuse?: No  Histroy of Prescripton Drug Abuse?: No    Medical Problems:   Past Medical History:   Diagnosis Date    ADHD     Adult respiratory distress syndrome ([address]     ICU     Anxiety     Bipolar disorder     Cognitive disorder     Finger fracture, left 11/22/2017    GERD (gastroesophageal reflux disease)     Hiatal hernia 01/19/2018    Hypertension     Kidney stones     MR (mental retardation), moderate     Nausea and vomiting     Pneumococcal pneumonia [address]     Seizures ([facility]     pt denied any seizures ever       Status EXAM:  Status and Exam  Normal: No  Facial Expression: Exaggerated  Affect: Incongruent  Level of Consciousness: Alert  Mood:Normal: Yes  Motor Activity:Normal: No  Motor Activity: Increased  Interview Behavior: Cooperative  Preception: [place] to Person,[place] to Time,[place] to Place,[place] to Situation  Attention:Normal: No  Attention: Unable to Concentrate,Distractible  Thought Processes: Flt.of Ideas,Loose Assoc.   Thought Content:Normal: Yes  Hallucinations: None  Delusions: No  Memory:Normal: Yes  Insight and Judgment: No  Insight and Judgment: Poor Judgment,Poor Insight,Unrealistic  Present Suicidal Ideation: No  Present Homicidal Ideation: No    Tobacco Screening:  Practical Counseling, on admission, benedicto X, if applicable and completed (first 3 are required if patient doesn't refuse):            ( )  Recognizing danger situations (included triggers and roadblocks)                    ( )  Coping skills (new ways to manage stress, exercise, relaxation techniques, changing routine, distraction)                                                           ( )  Basic information about quitting (benefits of quitting, techniques in how to quit, available resources  ( ) Referral for counseling faxed to [facility]                                           ( ) Patient refused counseling  ( ) Patient has not smoked in the last 30 days    Metabolic Screening:    Lab Results   Component Value Date    LABA1C 4.7 01/09/2022       Lab Results   Component Value Date    CHOL 170 01/09/2022     Lab Results   Component Value Date    TRIG 72 01/09/2022     Lab Results   Component Value Date    HDL 63 (H) 01/09/2022     No components found for: LDLCAL  Lab Results   Component Value Date    LABVLDL 14 01/09/2022         There is no height or weight on file to calculate BMI. BP Readings from Last 2 Encounters:   03/04/22 129/87   01/19/22 133/76           Pt admitted with followings belongings:  Dental Appliances: Lowers,Uppers  Vision - Corrective Lenses: None  Hearing Aid: None  Jewelry: None  Body Piercings Removed: N/A  Clothing: Footwear,Pants,Shirt,Socks,Undergarments (Comment),Pajamas  Were All Patient Medications Collected?: Not Applicable  Other Valuables: Cell phone,Wallet     Patient's home medications were NA. Patient oriented to surroundings and program expectations and copy of patient rights given. Received admission packet:  yes. Consents reviewed, signed yes. Refused nothing, pleasant and cooperative. Patient verbalize understanding:  yes.   Patient education on precautions: yes                   Brittny Rodriguez RN

## 2022-03-04 NOTE — ED NOTES
Pt appears to be lying in treatment room, and in no distress. Will continue to monitor for pt safety.      Lyric Swenson  03/03/22 9448

## 2022-03-05 LAB
ESTIMATED AVERAGE GLUCOSE: 93.9 MG/DL
HBA1C MFR BLD: 4.9 %

## 2022-03-05 PROCEDURE — 6370000000 HC RX 637 (ALT 250 FOR IP): Performed by: STUDENT IN AN ORGANIZED HEALTH CARE EDUCATION/TRAINING PROGRAM

## 2022-03-05 PROCEDURE — 6370000000 HC RX 637 (ALT 250 FOR IP): Performed by: PSYCHIATRY & NEUROLOGY

## 2022-03-05 PROCEDURE — 6370000000 HC RX 637 (ALT 250 FOR IP)

## 2022-03-05 PROCEDURE — 1240000000 HC EMOTIONAL WELLNESS R&B

## 2022-03-05 RX ORDER — ZIPRASIDONE HYDROCHLORIDE 80 MG/1
80 CAPSULE ORAL ONCE
Status: DISCONTINUED | OUTPATIENT
Start: 2022-03-05 | End: 2022-03-07 | Stop reason: HOSPADM

## 2022-03-05 RX ORDER — DIPHENHYDRAMINE HYDROCHLORIDE 50 MG/ML
50 INJECTION INTRAMUSCULAR; INTRAVENOUS EVERY 6 HOURS PRN
Status: DISCONTINUED | OUTPATIENT
Start: 2022-03-05 | End: 2022-03-07 | Stop reason: HOSPADM

## 2022-03-05 RX ORDER — LORAZEPAM 1 MG/1
1 TABLET ORAL ONCE
Status: COMPLETED | OUTPATIENT
Start: 2022-03-05 | End: 2022-03-05

## 2022-03-05 RX ORDER — LORAZEPAM 2 MG/1
2 TABLET ORAL ONCE
Status: COMPLETED | OUTPATIENT
Start: 2022-03-05 | End: 2022-03-05

## 2022-03-05 RX ORDER — OLANZAPINE 5 MG/1
5 TABLET ORAL 2 TIMES DAILY
Status: DISCONTINUED | OUTPATIENT
Start: 2022-03-05 | End: 2022-03-07 | Stop reason: HOSPADM

## 2022-03-05 RX ORDER — HALOPERIDOL 5 MG/ML
5 INJECTION INTRAMUSCULAR EVERY 6 HOURS PRN
Status: DISCONTINUED | OUTPATIENT
Start: 2022-03-05 | End: 2022-03-06

## 2022-03-05 RX ORDER — ZIPRASIDONE HYDROCHLORIDE 80 MG/1
80 CAPSULE ORAL EVERY 12 HOURS PRN
Status: DISCONTINUED | OUTPATIENT
Start: 2022-03-05 | End: 2022-03-07 | Stop reason: HOSPADM

## 2022-03-05 RX ADMIN — ASPIRIN 4 MG: 325 TABLET, COATED ORAL at 06:39

## 2022-03-05 RX ADMIN — LORAZEPAM 1 MG: 1 TABLET ORAL at 11:17

## 2022-03-05 RX ADMIN — ASPIRIN 4 MG: 325 TABLET, COATED ORAL at 11:59

## 2022-03-05 RX ADMIN — ASPIRIN 4 MG: 325 TABLET, COATED ORAL at 09:41

## 2022-03-05 RX ADMIN — OLANZAPINE 5 MG: 5 TABLET, FILM COATED ORAL at 15:40

## 2022-03-05 RX ADMIN — MELOXICAM 7.5 MG: 15 TABLET ORAL at 08:52

## 2022-03-05 RX ADMIN — OLANZAPINE 10 MG: 10 TABLET, FILM COATED ORAL at 00:43

## 2022-03-05 RX ADMIN — ACETAMINOPHEN 650 MG: 325 TABLET ORAL at 17:41

## 2022-03-05 RX ADMIN — ASPIRIN 4 MG: 325 TABLET, COATED ORAL at 16:55

## 2022-03-05 RX ADMIN — HYDROXYZINE HYDROCHLORIDE 50 MG: 50 TABLET, FILM COATED ORAL at 06:39

## 2022-03-05 RX ADMIN — LORAZEPAM 2 MG: 2 TABLET ORAL at 22:28

## 2022-03-05 RX ADMIN — ZIPRASIDONE HYDROCHLORIDE 80 MG: 80 CAPSULE ORAL at 16:38

## 2022-03-05 RX ADMIN — ASPIRIN 4 MG: 325 TABLET, COATED ORAL at 22:20

## 2022-03-05 RX ADMIN — ASPIRIN 4 MG: 325 TABLET, COATED ORAL at 14:25

## 2022-03-05 RX ADMIN — OLANZAPINE 10 MG: 10 TABLET, FILM COATED ORAL at 08:53

## 2022-03-05 RX ADMIN — ASPIRIN 4 MG: 325 TABLET, COATED ORAL at 19:59

## 2022-03-05 RX ADMIN — HYDROXYZINE HYDROCHLORIDE 50 MG: 50 TABLET, FILM COATED ORAL at 14:56

## 2022-03-05 ASSESSMENT — PAIN SCALES - GENERAL
PAINLEVEL_OUTOF10: 7
PAINLEVEL_OUTOF10: 10
PAINLEVEL_OUTOF10: 0

## 2022-03-05 NOTE — GROUP NOTE
Group Therapy Note    Date: 3/4/2022    Group Start Time: 2045  Group End Time: 2105  Group Topic: [facility]        Group Therapy Note    Attendees: Goals and importance of goal setting discussed. Night time milieu activities discussed.          Patient's Goal:  Get out to help others    Notes:       Status After Intervention:  Improved    Participation Level: Monopolizing    Participation Quality: Inappropriate and Intrusive      Speech:  pressured      Thought Process/Content: Flight of ideas      Affective Functioning: Congruent      Mood: elevated      Level of consciousness:  Oriented x4 and Inattentive      Response to Learning: Progressing to goal      Endings: None Reported    Modes of Intervention: Support      Discipline Responsible: [address], PublishThis      Signature:  Gerald Marie

## 2022-03-05 NOTE — PROGRESS NOTES
Purposeful Rounding    Patient Location: Patient room    Patient willing to engage in conversation: No    Presentation/behavior: Other asleep*    Affect: Neutral/Euthymic(normal)    Concerns reported: 0    PRN medications given: 0045 Zyprexa 10mg PO    Environmental assessment: Room free from clutter, Clear path to bathroom  and Adequate lighting    Fall prevention interventions in place: Lighting appropriate, Room free of clutter and Clear path to bathroom    Daily [place] Fall Risk Score: 0    Daily [place] Fall Risk Score: 0      Electronically signed by Manjinder Chen RN on 3/5/22 at 5:33 AM EST

## 2022-03-05 NOTE — BH NOTE
Purposeful Rounding    Patient Location: Day room    Patient willing to engage in conversation: Yes    Presentation/behavior: Attention Seeking    Affect: Labile    Concerns reported: None    PRN medications given: N/A    Environmental assessment: No safety hazards noted    Fall prevention interventions in place: Lighting appropriate, Room free of clutter and Clear path to bathroom    Daily [place] Fall Risk Score: 69    Daily Laws Fall Risk Score: 15

## 2022-03-05 NOTE — BH NOTE
Collateral from Mom: \"My daughter said that Magalys Matthews called her and said he was still feeling suicidal. We do have weapons in the house, but they are locked. We won't be home tomorrow. He might break into get them. I wouldn't feel comfortable with him getting him discharged tomorrow. \"

## 2022-03-05 NOTE — BH NOTE
Purposeful Rounding    Patient Location: Day room    Patient willing to engage in conversation: Yes    Presentation/behavior: Attention Seeking    Affect: Labile    Concerns reported: None    PRN medications given: N/A    Environmental assessment: No safety hazards noted    Fall prevention interventions in place: Lighting appropriate, Room free of clutter and Clear path to bathroom    Daily [place] Fall Risk Score: 69    Daily [place] Fall Risk Score: 15

## 2022-03-05 NOTE — GROUP NOTE
Group Therapy Note    Date: 3/5/2022    Group Start Time: 1100  Group End Time: 1150  Group Topic: Cognitive Skills    [id number] [facility], [place]        Group Therapy Note    Attendees: 12       This group was facilitated by the nursing students    Patient's Goal:  Patient will identify coping skills in times of distress    Notes:  Patients identified effective coping skills for times of distress and crisis.  Jerri Coats participated in the discussion, but was in and out of the group and was disruptive at times    Status After Intervention:  Improved    Participation Level: Monopolizing    Participation Quality: Intrusive      Speech:  loud      Thought Process/Content: Flight of ideas      Affective Functioning: Congruent      Mood: anxious      Level of consciousness:  Inattentive      Response to Learning: Progressing to goal      Endings: None Reported    Modes of Intervention: Education      Discipline Responsible: /Counselor      Signature:  JULISA Galdamez

## 2022-03-05 NOTE — FLOWSHEET NOTE
Purposeful Rounding     Patient Location: Patient room     Patient willing to engage in conversation: No     Presentation/behavior: Other asleep*     Affect: Neutral/Euthymic(normal)     Concerns reported: 0     PRN medications given: 0045 Zyprexa 10mg PO     Environmental assessment: Room free from clutter, Clear path to bathroom  and Adequate lighting     Fall prevention interventions in place: Lighting appropriate, Room free of clutter and Clear path to bathroom     Daily [place] Fall Risk Score: 0     Daily [place] Fall Risk Score: 0

## 2022-03-05 NOTE — BH NOTE
Collateral from Mother Karely Tyson) - \"My son talked to me this morning, and he is still acting the same. He is really short with me, and acting bossy. He is saying things that aren't really true - like he is vegan. He isn't vegan - he only says that. He has the mindset of a teenager. I understand that they are going to increase the dose of the invega shot - but right now it is only lasting two weeks. If you could just let the doctor know. \"

## 2022-03-05 NOTE — PROGRESS NOTES
Rapid pressured speech. Demanding & irritable. Challenging when attempted to redirect him. Denies feeling he is manic like. Wants a private room because he is worried he will wake his roomamte with talking in his sleep or sleep walking or night terrors, etc.  Zyprexa 10mg po given for increasing agitation.

## 2022-03-05 NOTE — BH NOTE
Purposeful Rounding    Patient Location: Day room    Patient willing to engage in conversation: Yes    Presentation/behavior: Attention Seeking    Affect: Labile    Concerns reported: None    PRN medications given: N/A    Environmental assessment: No safety hazards noted    Fall prevention interventions in place: Lighting appropriate, Room free of clutter and Clear path to bathroom    Daily [place] Fall Risk Score: 69    Daily [place] Fall Risk Score: 15

## 2022-03-05 NOTE — PROGRESS NOTES
mmol/L Final    Potassium reflex Magnesium 03/03/2022 3.8  3.5 - 5.1 mmol/L Final    Chloride 03/03/2022 103  99 - 110 mmol/L Final    CO2 03/03/2022 28  21 - 32 mmol/L Final    Anion Gap 03/03/2022 7  3 - 16 Final    Glucose 03/03/2022 90  70 - 99 mg/dL Final    BUN 03/03/2022 11  7 - 20 mg/dL Final    CREATININE 03/03/2022 0.9  0.9 - 1.3 mg/dL Final    GFR Non- 03/03/2022 >60  >60 Final    Comment: >60 mL/min/1.73m2 EGFR, calc. for ages 25 and older using the  MDRD formula (not corrected for weight), is valid for stable  renal function.  GFR  03/03/2022 >60  >60 Final    Comment: Chronic Kidney Disease: less than 60 ml/min/1.73 sq.m. Kidney Failure: less than 15 ml/min/1.73 sq.m. Results valid for patients 18 years and older.  Calcium 03/03/2022 8.8  8.3 - 10.6 mg/dL Final    Total Protein 03/03/2022 6.5  6.4 - 8.2 g/dL Final    Albumin 03/03/2022 3.9  3.4 - 5.0 g/dL Final    Albumin/Globulin Ratio 03/03/2022 1.5  1.1 - 2.2 Final    Total Bilirubin 03/03/2022 0.4  0.0 - 1.0 mg/dL Final    Alkaline Phosphatase 03/03/2022 71  40 - 129 U/L Final    ALT 03/03/2022 26  10 - 40 U/L Final    AST 03/03/2022 39* 15 - 37 U/L Final    Amphetamine Screen, Urine 03/03/2022 Neg  Negative <1000ng/mL Final    Barbiturate Screen, Ur 03/03/2022 Neg  Negative <200 ng/mL Final    Benzodiazepine Screen, Urine 03/03/2022 Neg  Negative <200 ng/mL Final    Cannabinoid Scrn, Ur 03/03/2022 POSITIVE* Negative <50 ng/mL Final    Cocaine Metabolite Screen, Urine 03/03/2022 Neg  Negative <300 ng/mL Final    Opiate Scrn, Ur 03/03/2022 Neg  Negative <300 ng/mL Final    Comment: \"Therapeutic levels of pain medication, especially oxycontin and synthetic  opioids, may not be detected by this Methodology. Pain management screen  panel  Drug panel-PM-Hi Res Ur, Interp (PAIN) should be considered for drug  monitoring \".       PCP Screen, Urine 03/03/2022 Neg  Negative <25 ng/mL Final    Methadone Screen, Urine 03/03/2022 Neg  Negative <300 ng/mL Final    Propoxyphene Scrn, Ur 03/03/2022 Neg  Negative <300 ng/mL Final    Oxycodone Urine 03/03/2022 Neg  Negative <100 ng/ml Final    pH, UA 03/03/2022 6.5   Final    Comment: Urine pH less than 5.0 or greater than 8.0 may indicate sample adulteration. Another sample should be collected if clinically  indicated.  Drug Screen Comment: 03/03/2022 see below   Final    Comment: This method is a screening test to detect only these drug  classes as part of a medical workup. Confirmatory testing  by another method should be ordered if clinically indicated.  Ethanol Lvl 03/03/2022 None Detected  mg/dL Final    Comment:    None Detected  Conversion factor:  100 mg/dl = .100 g/dl  For Medical Purposes Only      Acetaminophen Level 03/03/2022 <5* 10 - 30 ug/mL Final    Comment: Therapeutic Range: 10.0-30.0 ug/mL  Toxic: >=884 ug/mL      Salicylate, Serum 31/77/9299 <0.3* 15.0 - 30.0 mg/dL Final    Comment: Therapeutic Range: 15.0-30.0 mg/dL  Toxic: >30.0 mg/dL      SARS-CoV-2 RNA, RT PCR 03/03/2022 NOT DETECTED  NOT DETECTED Final    Comment: Not Detected results do not preclude SARS-CoV-2 infection and  should not be used as the sole basis for patient management  decisions. Results must be combined with clinical observations,  patient history, and epidemiological information. Testing was performed using KRYSTIAN NANCY SARS-CoV-2 and Influenza A/B  nucleic acid assay. This test is a multiplex Real-Time Reverse  Transcriptase Polymerase Chain Reaction (RT-PCR)-based in vitro  diagnostic test intended for the qualitative detection of nucleic  acids from SARS-CoV-2, influenza A, and influenza B in nasopharyngeal  and nasal swab specimens for use under the FDAs Emergency Use  Authorization (EUA) only.     Patient Fact Sheet:  FindDrives.pl  Provider Fact Sheet: FindDrives.pl  EUA: [place]  IFU: [place]    Methodology:  RT-PCR      INFLUENZA A 03/03/2022 NOT DETECTED  NOT DETECTED Final    INFLUENZA B 03/03/2022 NOT DETECTED  NOT DETECTED Final    Cholesterol, Total 03/03/2022 176  0 - 199 mg/dL Final    Triglycerides 03/03/2022 114  0 - 150 mg/dL Final    HDL 03/03/2022 64* 40 - 60 mg/dL Final    LDL Calculated 03/03/2022 89  <100 mg/dL Final    VLDL Cholesterol Calculated 03/03/2022 23  Not Established mg/dL Final    Hemoglobin A1C 03/03/2022 4.9  See comment % Final    Comment: Comment:  Diagnosis of Diabetes: > or = 6.5%  Increased risk of diabetes (Prediabetes): 5.7-6.4%  Glycemic Control: Nonpregnant Adults: <7.0%                    Pregnant: <6.0%        eAG 03/03/2022 93.9  mg/dL Final    TSH 03/03/2022 0.28  0.27 - 4.20 uIU/mL Final            Medications  Current Facility-Administered Medications: ziprasidone (GEODON) capsule 80 mg, 80 mg, Oral, Once  ziprasidone (GEODON) capsule 80 mg, 80 mg, Oral, Q12H PRN  OLANZapine (ZYPREXA) tablet 5 mg, 5 mg, Oral, BID  meloxicam (MOBIC) tablet 7.5 mg, 7.5 mg, Oral, Daily  nicotine polacrilex (COMMIT) lozenge 4 mg, 4 mg, Oral, Q2H PRN  hydrOXYzine (ATARAX) tablet 50 mg, 50 mg, Oral, TID PRN  nicotine (NICODERM CQ) 14 MG/24HR 1 patch, 1 patch, TransDERmal, Daily  acetaminophen (TYLENOL) tablet 650 mg, 650 mg, Oral, Q4H PRN  ibuprofen (ADVIL;MOTRIN) tablet 400 mg, 400 mg, Oral, Q6H PRN  magnesium hydroxide (MILK OF MAGNESIA) 400 MG/5ML suspension 30 mL, 30 mL, Oral, Daily PRN  aluminum & magnesium hydroxide-simethicone (MAALOX) 200-200-20 MG/5ML suspension 30 mL, 30 mL, Oral, Q6H PRN  sterile water injection 2.1 mL, 2.1 mL, IntraMUSCular, Q4H PRN  diphenhydrAMINE (BENADRYL) injection 50 mg, 50 mg, IntraMUSCular, Q4H PRN    ASSESSMENT AND PLAN    Principal Problem:    Bipolar 1 disorder (HCC)  Active Problems:    Autistic spectrum disorder    Marijuana use    Encounter for psychological evaluation    Anemia  Resolved Problems:    * No resolved hospital problems. *       Continues to be manic. Is not due for his [place]. In a perfect world, he would start a mood stabilizer, but he refuses this. He asks for zyprexa or geodon, or ativan and geodon injections. We negotiated, and it was decided to start him on Zyprexa 5 mg BID. He agrees.

## 2022-03-05 NOTE — PLAN OF CARE
Problem: Suicide risk  Description: Suicide risk  Goal: Provide patient with safe environment  Description: Provide patient with safe environment  3/4/2022 2240 by Dre Ang RN  Outcome: Ongoing  3/4/2022 1132 by Antoni Jurado RN  Outcome: Ongoing     Pt is visible and social on unit. He is calm and cooperative but at desk with request frequently. During interview he is tangential with poverty of content reports good appetite good sleep. Rates anxiety a 3 on 0-10 scale. He is bright and denies all, CFS. Reports good support from friends and family. States he is working with a  at home to get a job. States he has hip pain rated a 10 on 0-10 scale. He reports receiving therapy twice a week at his residence. Denies needs or concerns at this time. Prn medication given see MAR.

## 2022-03-06 PROCEDURE — 6360000002 HC RX W HCPCS: Performed by: PSYCHIATRY & NEUROLOGY

## 2022-03-06 PROCEDURE — 6360000002 HC RX W HCPCS

## 2022-03-06 PROCEDURE — 1240000000 HC EMOTIONAL WELLNESS R&B

## 2022-03-06 PROCEDURE — 6370000000 HC RX 637 (ALT 250 FOR IP)

## 2022-03-06 PROCEDURE — 6370000000 HC RX 637 (ALT 250 FOR IP): Performed by: PSYCHIATRY & NEUROLOGY

## 2022-03-06 PROCEDURE — 6370000000 HC RX 637 (ALT 250 FOR IP): Performed by: STUDENT IN AN ORGANIZED HEALTH CARE EDUCATION/TRAINING PROGRAM

## 2022-03-06 RX ORDER — HALOPERIDOL 5 MG/ML
10 INJECTION INTRAMUSCULAR EVERY 6 HOURS PRN
Status: DISCONTINUED | OUTPATIENT
Start: 2022-03-06 | End: 2022-03-07 | Stop reason: HOSPADM

## 2022-03-06 RX ORDER — LORAZEPAM 2 MG/ML
2 INJECTION INTRAMUSCULAR EVERY 6 HOURS PRN
Status: DISCONTINUED | OUTPATIENT
Start: 2022-03-06 | End: 2022-03-07 | Stop reason: HOSPADM

## 2022-03-06 RX ADMIN — OLANZAPINE 5 MG: 5 TABLET, FILM COATED ORAL at 08:12

## 2022-03-06 RX ADMIN — ZIPRASIDONE HYDROCHLORIDE 80 MG: 80 CAPSULE ORAL at 09:13

## 2022-03-06 RX ADMIN — ASPIRIN 4 MG: 325 TABLET, COATED ORAL at 19:40

## 2022-03-06 RX ADMIN — ASPIRIN 4 MG: 325 TABLET, COATED ORAL at 21:27

## 2022-03-06 RX ADMIN — MELOXICAM 7.5 MG: 15 TABLET ORAL at 08:10

## 2022-03-06 RX ADMIN — HALOPERIDOL LACTATE 10 MG: 5 INJECTION, SOLUTION INTRAMUSCULAR at 16:41

## 2022-03-06 RX ADMIN — LORAZEPAM 2 MG: 2 INJECTION INTRAMUSCULAR; INTRAVENOUS at 16:43

## 2022-03-06 RX ADMIN — ASPIRIN 4 MG: 325 TABLET, COATED ORAL at 13:33

## 2022-03-06 RX ADMIN — OLANZAPINE 5 MG: 5 TABLET, FILM COATED ORAL at 19:40

## 2022-03-06 RX ADMIN — ACETAMINOPHEN 650 MG: 325 TABLET ORAL at 08:11

## 2022-03-06 RX ADMIN — HALOPERIDOL LACTATE 5 MG: 5 INJECTION, SOLUTION INTRAMUSCULAR at 10:00

## 2022-03-06 RX ADMIN — LORAZEPAM 2 MG: 2 INJECTION INTRAMUSCULAR; INTRAVENOUS at 22:55

## 2022-03-06 RX ADMIN — ASPIRIN 4 MG: 325 TABLET, COATED ORAL at 17:46

## 2022-03-06 RX ADMIN — ASPIRIN 4 MG: 325 TABLET, COATED ORAL at 15:50

## 2022-03-06 RX ADMIN — DIPHENHYDRAMINE HYDROCHLORIDE 50 MG: 50 INJECTION, SOLUTION INTRAMUSCULAR; INTRAVENOUS at 16:42

## 2022-03-06 RX ADMIN — DIPHENHYDRAMINE HYDROCHLORIDE 50 MG: 50 INJECTION, SOLUTION INTRAMUSCULAR; INTRAVENOUS at 10:00

## 2022-03-06 RX ADMIN — ASPIRIN 4 MG: 325 TABLET, COATED ORAL at 10:11

## 2022-03-06 ASSESSMENT — PAIN SCALES - GENERAL: PAINLEVEL_OUTOF10: 5

## 2022-03-06 NOTE — BH NOTE
Patient requesting to be discharged. Patient becoming agitated stating, \"I signed myself in - I should be able to sign myself out! \" Patient called [phone] to inform police he was being held against his will. Patient informed he could write a 3 day letter - he refused. Patient initially refused Geodon PO. Dr. Kenny Barbosa aware. Then Patient requested and took Geodon 80 mg. Dr. Kenny Barbosa aware.    -Patient later playing ARPIT calmly in the dayroom.

## 2022-03-06 NOTE — PLAN OF CARE
Problem: Suicide risk  Goal: Provide patient with safe environment  Description: Provide patient with safe environment  Outcome: Ongoing     Patient up ad jenn. Sociable with peers. Hyperverbal, boisterous on unit. Redirectable. Pt stated he was feeling anxious and needed a sleep aid. A 1X order for 2mg ativan received from Dr. Caitlyn Jimenez. Medication effective. Pt slept throughout night with no noted issues. Monitored for safety and comfort.

## 2022-03-06 NOTE — PLAN OF CARE
Problem: Suicide risk  Goal: Provide patient with safe environment  3/6/2022 1229 by Star Rasheed RN  Outcome: Ongoing  3/6/2022 0701 by Chacho Hutson RN  Outcome: Ongoing   Patient denies SI/HI/AVH, contracts for safety while on the unit. Frequently requests medication for \"anxiety and I have mental health problems\". Medications administered per order with mild effectiveness noted.

## 2022-03-06 NOTE — BH NOTE
Continues to test boundaries. Sitting in [employer] with a select female staff. Had been instructed to keep the music tone at a specific setting but will turn the setting higher. Informed the reason for the lower setting (that it does interfere with those watching the TV. \"Ok\". Also discussed boundaries of touching others, patting them on the back or as he has done kissed this female peer on the cheek. (this occurred one day ago). Again responded \"OK\" then changed the topic.

## 2022-03-06 NOTE — PROGRESS NOTES
Patient has been elevated and loud this morning as evidenced by running in the hallway to attempt to slide in his socks, Karate kicking the air to show how high he can kick, coming out of his room while still putting his shirt on, shouting and cursing at his wife on the phone, speech is still rapid and slightly pressured and he is unable to focus on one subject for any length of time. He is labile and easily irritable if he does not receive what he wants quickly. Frequently he is at the desk for numerous requests such as ice water, medications, play a game, play cards, wants a nicotine lozenge usually 45 minutes to an hour early, wants to know what time it is, wants to know if anyone has called for him, wants to report issues he feels he needs to report to the doctor and more similar requests were made. Attempted to redirect by playing cards with him in a secluded room to decrease stimulation, listen to music, coloring, encouraged to read or watch television, but patient continued to escalate several minutes after receiving all medications. Patient was administered IM Benadryl and Haldol at 10AM d/t elevated mood and unable to redirect. Patient encouraged to go to his room to relax until lunch.

## 2022-03-06 NOTE — BH NOTE
Time: 4623-3247      Type of Group: Health and Wellness      Level of Participation: Intrusive      Comments: left group then rejoined

## 2022-03-06 NOTE — PROGRESS NOTES
Patient up to the nurses station demanding something for \"anger and nervous\". Refused to participate in group therapy continued to request his nurse Matt Pederson me my medicine before I go off\". Voice loud and almost shouting, hands clenched, disrupting the mileau. Unable to redirect, IM medications administered per order.

## 2022-03-06 NOTE — BH NOTE
Time: 3045-1944      Type of Group: Health and Wellness      Level of Participation: Active      Comments: Able to share

## 2022-03-07 VITALS
HEART RATE: 115 BPM | BODY MASS INDEX: 25.73 KG/M2 | OXYGEN SATURATION: 96 % | HEIGHT: 72 IN | RESPIRATION RATE: 18 BRPM | DIASTOLIC BLOOD PRESSURE: 85 MMHG | SYSTOLIC BLOOD PRESSURE: 120 MMHG | WEIGHT: 190 LBS | TEMPERATURE: 97.8 F

## 2022-03-07 PROCEDURE — 6370000000 HC RX 637 (ALT 250 FOR IP): Performed by: STUDENT IN AN ORGANIZED HEALTH CARE EDUCATION/TRAINING PROGRAM

## 2022-03-07 PROCEDURE — 5130000000 HC BRIDGE APPOINTMENT

## 2022-03-07 PROCEDURE — 6370000000 HC RX 637 (ALT 250 FOR IP): Performed by: PSYCHIATRY & NEUROLOGY

## 2022-03-07 PROCEDURE — 6360000002 HC RX W HCPCS: Performed by: PSYCHIATRY & NEUROLOGY

## 2022-03-07 RX ORDER — MELOXICAM 7.5 MG/1
7.5 TABLET ORAL DAILY
Qty: 30 TABLET | Refills: 0 | Status: SHIPPED | OUTPATIENT
Start: 2022-03-08 | End: 2022-04-25 | Stop reason: ALTCHOICE

## 2022-03-07 RX ORDER — ZIPRASIDONE HYDROCHLORIDE 20 MG/1
20 CAPSULE ORAL ONCE
Status: COMPLETED | OUTPATIENT
Start: 2022-03-07 | End: 2022-03-07

## 2022-03-07 RX ORDER — MIRTAZAPINE 30 MG/1
30 TABLET, FILM COATED ORAL NIGHTLY
Qty: 30 TABLET | Refills: 0 | Status: SHIPPED | OUTPATIENT
Start: 2022-03-07 | End: 2022-04-25 | Stop reason: ALTCHOICE

## 2022-03-07 RX ORDER — OLANZAPINE 5 MG/1
5 TABLET ORAL 2 TIMES DAILY
Qty: 60 TABLET | Refills: 0 | Status: SHIPPED | OUTPATIENT
Start: 2022-03-07 | End: 2022-04-25 | Stop reason: ALTCHOICE

## 2022-03-07 RX ADMIN — LORAZEPAM 2 MG: 2 INJECTION INTRAMUSCULAR; INTRAVENOUS at 08:10

## 2022-03-07 RX ADMIN — ASPIRIN 4 MG: 325 TABLET, COATED ORAL at 10:09

## 2022-03-07 RX ADMIN — ZIPRASIDONE HYDROCHLORIDE 20 MG: 20 CAPSULE ORAL at 10:40

## 2022-03-07 RX ADMIN — MELOXICAM 7.5 MG: 15 TABLET ORAL at 08:10

## 2022-03-07 RX ADMIN — OLANZAPINE 5 MG: 5 TABLET, FILM COATED ORAL at 08:10

## 2022-03-07 RX ADMIN — ASPIRIN 4 MG: 325 TABLET, COATED ORAL at 08:10

## 2022-03-07 ASSESSMENT — PAIN SCALES - GENERAL: PAINLEVEL_OUTOF10: 7

## 2022-03-07 NOTE — PROGRESS NOTES
Pt requested PRN IM ativan for anxiety, agitation and sleep. IM ativan 2mg given at 2255. Pt became sexually inappropriate, moaning, stating how it felt so good. Pt stated the pheromones' were such a turn on. Previously in shift pt had to be redirected multiple times not to go down the halls with females where staff could not visually see them. He was caught holding hands with one peer and walking to the same corners with another peer. Pt had been sexually inappropriate throughout the night and hypersexual with staff and peers. Much redirection needed.

## 2022-03-07 NOTE — BH NOTE
[facility][1]  Discharge Note    Pt discharged with followings belongings:   Dental Appliances: Lowers,Uppers  Vision - Corrective Lenses: None  Hearing Aid: None  Jewelry: None  Body Piercings Removed: N/A  Clothing: Footwear,Pants,Shirt,Socks,Undergarments (Comment),Pajamas  Were All Patient Medications Collected?: Not Applicable  Other Valuables: Cell phone,Wallet   Valuables sent home with patient or returned to patient. Patient education on aftercare instructions: yes. Information faxed to [facility] by this writer  at 12:03 PM .Patient verbalize understanding of AVS:  yes.     Status EXAM upon discharge:  Status and Exam  Normal: No  Facial Expression: Exaggerated  Affect: Unstable,Incongruent  Level of Consciousness: Alert  Mood:Normal: No  Mood: Elated  Motor Activity:Normal: Yes  Motor Activity: Increased  Interview Behavior: Cooperative  Preception: [place] to Person,[place] to Time,[place] to Place,[place] to Situation  Attention:Normal: Yes  Attention: Distractible,Unable to Concentrate  Thought Processes: Flt.of Ideas  Thought Content:Normal: Yes  Thought Content: Other(See Comment) (nallely, pt manic)  Hallucinations: None  Delusions: No  Delusions: [place]  Memory:Normal: Yes  Memory: Poor Recent,Confabulation  Insight and Judgment: No  Insight and Judgment: Poor Judgment,Poor Insight  Present Suicidal Ideation: No  Present Homicidal Ideation: No      Metabolic Screening:    Lab Results   Component Value Date    LABA1C 4.9 03/03/2022       Lab Results   Component Value Date    CHOL 176 03/03/2022    CHOL 170 01/09/2022     Lab Results   Component Value Date    TRIG 114 03/03/2022    TRIG 72 01/09/2022     Lab Results   Component Value Date    HDL 64 (H) 03/03/2022    HDL 63 (H) 01/09/2022     No components found for: [facility] EVALUATION AND TREATMENT [place]  Lab Results   Component Value Date    LABVLDL 23 03/03/2022    LABVLDL 14 01/09/2022       Julianne Sanchez RN    Bridge Appointment completed: Reviewed Discharge Instructions with patient. Patient verbalizes understanding and agreement with the discharge plan using the teachback method.      Referral for Outpatient Tobacco Cessation Counseling, upon discharge (benedicto X if applicable and completed):    ( )  Hospital staff assisted patient to call Quit Line or faxed referral                                   during hospitalization                  (X)  Recognizing danger situations (included triggers and roadblocks), if not completed on admission                    (X)  Coping skills (new ways to manage stress, exercise, relaxation techniques, changing routine, distraction), if not completed on admission                                                           (X)  Basic information about quitting (benefits of quitting, techniques in how to quit, available resources, if not completed on admission  ( ) Referral for counseling faxed to [facility]   ( ) Patient refused referral  ( ) Patient refused counseling  ( ) Patient refused smoking cessation medication upon discharge    Vaccinations (benedicto X if applicable and completed):  ( ) Patient states already received influenza vaccine elsewhere  ( ) Patient received influenza vaccine during this hospitalization  ( ) Patient refused influenza vaccine at this time  (X) Not offered

## 2022-03-07 NOTE — GROUP NOTE
Group Therapy Note    Date: 3/6/2022    Group Start Time: 2040  Group End Time: 2115  Group Topic: Wrap-Up    [facility]        Group Therapy Note    Attendees: Goals and importance of goal setting discussed. Night time milieu activities discussed. Patient's Goal:  Be more positive    Notes:  Successful     Status After Intervention:  Improved    Participation Level:  Active Listener and Interactive    Participation Quality: Appropriate, Attentive and Supportive      Speech:  pressured and loud      Thought Process/Content: Logical  Linear      Affective Functioning: Congruent      Mood: euphoric      Level of consciousness:  Alert and Oriented x4      Response to Learning: Progressing to goal      Endings: None Reported    Modes of Intervention: Support      Discipline Responsible: [address], [employer]      Signature:  Maria Guadalupe Wheat

## 2022-03-07 NOTE — GROUP NOTE
Group Therapy Note    Date: 3/7/2022    Group Start Time: 1100  Group End Time: 8479  Group Topic: Cognitive Skills    [id number] [facility]        Group Therapy Note    Attendees: 12    Group members used yarn and created connections with other members of the group by answering various topics. Notes:  Norman attended group for majority of the time. Vicky Castrejon remained engaged and interacted with other members of the group. Status After Intervention:  Improved    Participation Level:  Active Listener and Interactive    Participation Quality: Appropriate and Attentive      Speech:  normal      Thought Process/Content: Logical      Affective Functioning: Congruent      Mood: Elevated       Level of consciousness:  Alert, Oriented x4 and Attentive      Response to Learning: Able to verbalize current knowledge/experience      Endings: None Reported    Modes of Intervention: Socialization, Exploration and Activity      Discipline Responsible: [employer]      Signature:  ALBERTO Nicole

## 2022-03-07 NOTE — BH NOTE
Morning medications administered at this time. Pt requesting PRN Ativan IM for \"racing thoughts\". Encouraged pt to take PO medications prior to IM but pt adamantly refused. During administration of IM Ativan pt began moaning in a sexual manor, stating \"That feels so good. \"

## 2022-03-08 ENCOUNTER — FOLLOWUP TELEPHONE ENCOUNTER (OUTPATIENT)
Dept: PSYCHIATRY | Age: 32
End: 2022-03-08

## 2022-03-08 NOTE — TELEPHONE ENCOUNTER
Pt's home and cell phone number listed in chart was for [facility]. Number removed from chart.

## 2022-04-01 NOTE — DISCHARGE SUMMARY
Discharge Summary   Admit Date: 3/3/2022   Discharge Date:  3/7/2022    Condition at dc stable  Spent over 40 minutes with patient and staff on [facility] with more than 50 % of time spent with patient discussing care  Final Dx: axis I: Bipolar 1 disorder ([address]                             Autistic Spectrum DO   Axis 2: No diagnosis  Elle 3: See Medical History    And Present on Admission:   Bipolar 1 disorder ([address]   Autistic spectrum disorder   Marijuana use   Encounter for psychological evaluation   Anemia     Axis 4: Problems related to the social environment  Axis 5:  On Admission: 41-50 serious symptoms At Discharge: 61-70 mild symptoms   All conditions on Axis 1 and Axis 2 and active problems on Axis 3 were treated while patient was hospitalized. [facility] Problems    Diagnosis Date Noted    Anemia [D64.9] 03/04/2022    Encounter for psychological evaluation [Z00.8]     Bipolar 1 disorder ([address] [F31.9] 03/03/2022    Marijuana use [F12.90]     Autistic spectrum disorder [F84.0] 01/13/2016   )   Condition on DC  Mood and affect are stable and pt is not suicidal   VITALS:  /85   Pulse 115   Temp 97.8 °F (36.6 °C) (Temporal)   Resp 18   Ht 6' (1.829 m)   Wt 190 lb (86.2 kg)   SpO2 96%   BMI 25.77 kg/m²   Brief Summary Present Illness    CHIEF COMPLAINT:  Kayli.     HISTORY OF PRESENT ILLNESS:  The patient is a 59-year-old male who was  recently hospitalized at [facility] on 01/09/2022 and presented to the ED at  [facility] on 03/03/2022 stating that his meds are not working and  he had been having high anxiety for 4 days. He was exhibiting flight of  ideas and was rambling through the ED eval.     The patient has a long history of manic behaviors. He states he has  been having anxiety and that he feels like he wants to commit suicide at  times. He denies any active plan at this time. His speech was  pressured.   He also made reports that he had access to knives at home  and has a history of cutting himself in the past.     He was open to coming into the hospital.  He was then seen in the [facility] as  well. He was cooperative but intrusive with staff and others. This is  not unusual for the patient. He accepted redirection and boundaries but  needed frequent reminders, again which is typical.  He then talked about  how he met his first wife on the psychiatric unit and his second wife  while on IOP. He went on to talk about one being transgender and was  rather off course with this topic. He is also worried about coming to  the hospital because he is concerned that he is having sexual side  effects with medication. This has been an ongoing concern as well. He  states that he had two shots of Invega recently, but I cannot verify  that at this point. He had an [place] injection on his last  admission and was due toward the middle of February. He stated that he  does not like to take the [place] any longer. He states he has  also tried meth twice recently and has been requested to be on Suboxone  and Ativan and Geodon. I checked his OARRS report, and he is not on any  prescribed benzodiazepine but in early 02/2022 and late 01/2022, he was  given 5 days of Ativan and 10 days of Xanax.     Also of note in the [facility] is that he made comments that he had multiple  guns in the home and states he has been feeling more stressed recently. He talked about going through a divorce and that his father is currently  in the hospital with Kathie. This was an issue in January when his  father was hospitalized as well. He was admitted because he was unable  to contract for safety. Hospital Course  Patient stabilized on meds and milieu treatment. We will continue with Invega Sustenna 234 mg monthly. We would need  to check when he last had his last injection. We will start Mobic 7.5 mg daily for pain   Patient was discharged to home to continue recovery in the community.    PE: (reviewed) and labs (see medical H&PE)  Labs:    Admission on 03/03/2022, Discharged on 03/07/2022   Component Date Value Ref Range Status    WBC 03/03/2022 8.9  4.0 - 11.0 K/uL Final    RBC 03/03/2022 3.96* 4.20 - 5.90 M/uL Final    Hemoglobin 03/03/2022 12.3* 13.5 - 17.5 g/dL Final    Hematocrit 03/03/2022 35.9* 40.5 - 52.5 % Final    MCV 03/03/2022 90.8  80.0 - 100.0 fL Final    MCH 03/03/2022 31.1  26.0 - 34.0 pg Final    MCHC 03/03/2022 34.2  31.0 - 36.0 g/dL Final    RDW 03/03/2022 12.3* 12.4 - 15.4 % Final    Platelets 21/29/3812 220  135 - 450 K/uL Final    MPV 03/03/2022 7.8  5.0 - 10.5 fL Final    Neutrophils % 03/03/2022 58.9  % Final    Lymphocytes % 03/03/2022 28.0  % Final    Monocytes % 03/03/2022 9.0  % Final    Eosinophils % 03/03/2022 3.3  % Final    Basophils % 03/03/2022 0.8  % Final    Neutrophils Absolute 03/03/2022 5.2  1.7 - 7.7 K/uL Final    Lymphocytes Absolute 03/03/2022 2.5  1.0 - 5.1 K/uL Final    Monocytes Absolute 03/03/2022 0.8  0.0 - 1.3 K/uL Final    Eosinophils Absolute 03/03/2022 0.3  0.0 - 0.6 K/uL Final    Basophils Absolute 03/03/2022 0.1  0.0 - 0.2 K/uL Final    Sodium 03/03/2022 138  136 - 145 mmol/L Final    Potassium reflex Magnesium 03/03/2022 3.8  3.5 - 5.1 mmol/L Final    Chloride 03/03/2022 103  99 - 110 mmol/L Final    CO2 03/03/2022 28  21 - 32 mmol/L Final    Anion Gap 03/03/2022 7  3 - 16 Final    Glucose 03/03/2022 90  70 - 99 mg/dL Final    BUN 03/03/2022 11  7 - 20 mg/dL Final    CREATININE 03/03/2022 0.9  0.9 - 1.3 mg/dL Final    GFR Non- 03/03/2022 >60  >60 Final    Comment: >60 mL/min/1.73m2 EGFR, calc. for ages 25 and older using the  MDRD formula (not corrected for weight), is valid for stable  renal function.  GFR  03/03/2022 >60  >60 Final    Comment: Chronic Kidney Disease: less than 60 ml/min/1.73 sq.m. Kidney Failure: less than 15 ml/min/1.73 sq.m.   Results valid for patients 18 years and older.  Calcium 03/03/2022 8.8  8.3 - 10.6 mg/dL Final    Total Protein 03/03/2022 6.5  6.4 - 8.2 g/dL Final    Albumin 03/03/2022 3.9  3.4 - 5.0 g/dL Final    Albumin/Globulin Ratio 03/03/2022 1.5  1.1 - 2.2 Final    Total Bilirubin 03/03/2022 0.4  0.0 - 1.0 mg/dL Final    Alkaline Phosphatase 03/03/2022 71  40 - 129 U/L Final    ALT 03/03/2022 26  10 - 40 U/L Final    AST 03/03/2022 39* 15 - 37 U/L Final    Amphetamine Screen, Urine 03/03/2022 Neg  Negative <1000ng/mL Final    Barbiturate Screen, Ur 03/03/2022 Neg  Negative <200 ng/mL Final    Benzodiazepine Screen, Urine 03/03/2022 Neg  Negative <200 ng/mL Final    Cannabinoid Scrn, Ur 03/03/2022 POSITIVE* Negative <50 ng/mL Final    Cocaine Metabolite Screen, Urine 03/03/2022 Neg  Negative <300 ng/mL Final    Opiate Scrn, Ur 03/03/2022 Neg  Negative <300 ng/mL Final    Comment: \"Therapeutic levels of pain medication, especially oxycontin and synthetic  opioids, may not be detected by this Methodology. Pain management screen  panel  Drug panel-PM-Hi Res Ur, Interp (PAIN) should be considered for drug  monitoring \".  PCP Screen, Urine 03/03/2022 Neg  Negative <25 ng/mL Final    Methadone Screen, Urine 03/03/2022 Neg  Negative <300 ng/mL Final    Propoxyphene Scrn, Ur 03/03/2022 Neg  Negative <300 ng/mL Final    Oxycodone Urine 03/03/2022 Neg  Negative <100 ng/ml Final    pH, UA 03/03/2022 6.5   Final    Comment: Urine pH less than 5.0 or greater than 8.0 may indicate sample adulteration. Another sample should be collected if clinically  indicated.  Drug Screen Comment: 03/03/2022 see below   Final    Comment: This method is a screening test to detect only these drug  classes as part of a medical workup. Confirmatory testing  by another method should be ordered if clinically indicated.       Ethanol Lvl 03/03/2022 None Detected  mg/dL Final    Comment:    None Detected  Conversion factor:  100 mg/dl = .100 g/dl  For Medical Purposes Only      Acetaminophen Level 03/03/2022 <5* 10 - 30 ug/mL Final    Comment: Therapeutic Range: 10.0-30.0 ug/mL  Toxic: >=177 ug/mL      Salicylate, Serum 00/85/8771 <0.3* 15.0 - 30.0 mg/dL Final    Comment: Therapeutic Range: 15.0-30.0 mg/dL  Toxic: >30.0 mg/dL      SARS-CoV-2 RNA, RT PCR 03/03/2022 NOT DETECTED  NOT DETECTED Final    Comment: Not Detected results do not preclude SARS-CoV-2 infection and  should not be used as the sole basis for patient management  decisions. Results must be combined with clinical observations,  patient history, and epidemiological information. Testing was performed using KRYSTIAN NANCY SARS-CoV-2 and Influenza A/B  nucleic acid assay. This test is a multiplex Real-Time Reverse  Transcriptase Polymerase Chain Reaction (RT-PCR)-based in vitro  diagnostic test intended for the qualitative detection of nucleic  acids from SARS-CoV-2, influenza A, and influenza B in nasopharyngeal  and nasal swab specimens for use under the FDAs Emergency Use  Authorization (EUA) only.     Patient Fact Sheet:  FindDrives.pl  Provider Fact Sheet: FindDrives.pl  EUA: FindDrives.pl  IFU: FindDrives.pl    Methodology:  RT-PCR      INFLUENZA A 03/03/2022 NOT DETECTED  NOT DETECTED Final    INFLUENZA B 03/03/2022 NOT DETECTED  NOT DETECTED Final    Cholesterol, Total 03/03/2022 176  0 - 199 mg/dL Final    Triglycerides 03/03/2022 114  0 - 150 mg/dL Final    HDL 03/03/2022 64* 40 - 60 mg/dL Final    LDL Calculated 03/03/2022 89  <100 mg/dL Final    VLDL Cholesterol Calculated 03/03/2022 23  Not Established mg/dL Final    Hemoglobin A1C 03/03/2022 4.9  See comment % Final    Comment: Comment:  Diagnosis of Diabetes: > or = 6.5%  Increased risk of diabetes (Prediabetes): 5.7-6.4%  Glycemic Control: Nonpregnant Adults: <7.0%                    Pregnant: <6.0%        eAG 03/03/2022 93.9  mg/dL Final    TSH 03/03/2022 0.28  0.27 - 4.20 uIU/mL Final        Mental Status Exam at Discharge:  Level of consciousness:  awake  Appearance:  well-appearing, in chair, good grooming and good hygiene well-developed, well-nourished  Behavior/Motor:  no abnormalities noted normal gait and station AIMS: 0  Attitude toward examiner:  cooperative, attentive and good eye contact  Speech:  spontaneous, normal rate, normal volume and well articulated  Mood:  dysthymic  Affect:  mood congruent Anxiety: mild  Hallucinations: Absent  Thought processes:  coherent Attention span, Concentration & Attention:  attention span and concentration were age appropriate  Thought content:   no evidence of delusions OCD: none    Insight: normal insight and judgment Cognition:  oriented to person, place, and time  Fund of Knowledge: average  IQ:average Memory: intact  Suicide:  No specific plan to harm self  Sleep: sleeps through the night  Appetite: ok   Reassess Medina Risk:  no specific plan to harm self Pt has phone numbers to contact if suicidal thoughts recur and states pt will return to the hospital if suicidal feelings return.    Hospital Routine Meds:     Hospital PRN Meds:    Discharge Meds:    Discharge Medication List as of 3/7/2022 10:32 AM           Details   OLANZapine (ZYPREXA) 5 MG tablet Take 1 tablet by mouth in the morning and at bedtime, Disp-60 tablet, R-0Normal      meloxicam (MOBIC) 7.5 MG tablet Take 1 tablet by mouth daily, Disp-30 tablet, R-0Normal      mirtazapine (REMERON) 30 MG tablet Take 1 tablet by mouth nightly, Disp-30 tablet, R-0Normal              Details   paliperidone palmitate ER (INVEGA SUSTENNA) 234 MG/1.5ML MADDI IM injection Inject 234 mg into the muscle once for 1 dose, Disp-1 each, R-0Normal                 Disposition - Residence Home    Follow Up:  See Discharge Instructions

## 2022-04-25 ENCOUNTER — HOSPITAL ENCOUNTER (INPATIENT)
Age: 32
LOS: 3 days | Discharge: HOME | DRG: 750 | End: 2022-04-28
Attending: STUDENT IN AN ORGANIZED HEALTH CARE EDUCATION/TRAINING PROGRAM | Admitting: PSYCHIATRY & NEUROLOGY
Payer: MEDICARE

## 2022-04-25 DIAGNOSIS — R45.851 SUICIDAL IDEATION: Primary | ICD-10-CM

## 2022-04-25 PROBLEM — F32.A DEPRESSION, UNSPECIFIED: Status: ACTIVE | Noted: 2022-04-25

## 2022-04-25 LAB
A/G RATIO: 1.7 (ref 1.1–2.2)
ALBUMIN SERPL-MCNC: 4.4 G/DL (ref 3.4–5)
ALP BLD-CCNC: 67 U/L (ref 40–129)
ALT SERPL-CCNC: 20 U/L (ref 10–40)
AMPHETAMINE SCREEN, URINE: ABNORMAL
ANION GAP SERPL CALCULATED.3IONS-SCNC: 8 MMOL/L (ref 3–16)
AST SERPL-CCNC: 17 U/L (ref 15–37)
BARBITURATE SCREEN URINE: ABNORMAL
BASOPHILS ABSOLUTE: 0.1 K/UL (ref 0–0.2)
BASOPHILS RELATIVE PERCENT: 0.8 %
BENZODIAZEPINE SCREEN, URINE: ABNORMAL
BILIRUB SERPL-MCNC: 0.7 MG/DL (ref 0–1)
BUN BLDV-MCNC: 11 MG/DL (ref 7–20)
CALCIUM SERPL-MCNC: 9.5 MG/DL (ref 8.3–10.6)
CANNABINOID SCREEN URINE: POSITIVE
CHLORIDE BLD-SCNC: 101 MMOL/L (ref 99–110)
CO2: 30 MMOL/L (ref 21–32)
COCAINE METABOLITE SCREEN URINE: ABNORMAL
CREAT SERPL-MCNC: 0.9 MG/DL (ref 0.9–1.3)
EOSINOPHILS ABSOLUTE: 0.3 K/UL (ref 0–0.6)
EOSINOPHILS RELATIVE PERCENT: 3.3 %
ETHANOL: NORMAL MG/DL (ref 0–0.08)
GFR AFRICAN AMERICAN: >60
GFR NON-AFRICAN AMERICAN: >60
GLUCOSE BLD-MCNC: 97 MG/DL (ref 70–99)
HCT VFR BLD CALC: 42 % (ref 40.5–52.5)
HEMOGLOBIN: 14.5 G/DL (ref 13.5–17.5)
INFLUENZA A: NOT DETECTED
INFLUENZA B: NOT DETECTED
LYMPHOCYTES ABSOLUTE: 2.8 K/UL (ref 1–5.1)
LYMPHOCYTES RELATIVE PERCENT: 33.6 %
Lab: ABNORMAL
MCH RBC QN AUTO: 30.9 PG (ref 26–34)
MCHC RBC AUTO-ENTMCNC: 34.4 G/DL (ref 31–36)
MCV RBC AUTO: 89.8 FL (ref 80–100)
METHADONE SCREEN, URINE: ABNORMAL
MONOCYTES ABSOLUTE: 0.8 K/UL (ref 0–1.3)
MONOCYTES RELATIVE PERCENT: 10.2 %
NEUTROPHILS ABSOLUTE: 4.3 K/UL (ref 1.7–7.7)
NEUTROPHILS RELATIVE PERCENT: 52.1 %
OPIATE SCREEN URINE: ABNORMAL
OXYCODONE URINE: ABNORMAL
PDW BLD-RTO: 12.5 % (ref 12.4–15.4)
PH UA: 5
PHENCYCLIDINE SCREEN URINE: ABNORMAL
PLATELET # BLD: 216 K/UL (ref 135–450)
PMV BLD AUTO: 7.1 FL (ref 5–10.5)
POTASSIUM REFLEX MAGNESIUM: 4.1 MMOL/L (ref 3.5–5.1)
PROPOXYPHENE SCREEN: ABNORMAL
RBC # BLD: 4.68 M/UL (ref 4.2–5.9)
SARS-COV-2 RNA, RT PCR: NOT DETECTED
SODIUM BLD-SCNC: 139 MMOL/L (ref 136–145)
TOTAL PROTEIN: 7 G/DL (ref 6.4–8.2)
WBC # BLD: 8.3 K/UL (ref 4–11)

## 2022-04-25 PROCEDURE — 85025 COMPLETE CBC W/AUTO DIFF WBC: CPT

## 2022-04-25 PROCEDURE — 1240000000 HC EMOTIONAL WELLNESS R&B

## 2022-04-25 PROCEDURE — 6370000000 HC RX 637 (ALT 250 FOR IP): Performed by: STUDENT IN AN ORGANIZED HEALTH CARE EDUCATION/TRAINING PROGRAM

## 2022-04-25 PROCEDURE — 80061 LIPID PANEL: CPT

## 2022-04-25 PROCEDURE — 82077 ASSAY SPEC XCP UR&BREATH IA: CPT

## 2022-04-25 PROCEDURE — 87636 SARSCOV2 & INF A&B AMP PRB: CPT

## 2022-04-25 PROCEDURE — 36415 COLL VENOUS BLD VENIPUNCTURE: CPT

## 2022-04-25 PROCEDURE — 83036 HEMOGLOBIN GLYCOSYLATED A1C: CPT

## 2022-04-25 PROCEDURE — 84443 ASSAY THYROID STIM HORMONE: CPT

## 2022-04-25 PROCEDURE — 80307 DRUG TEST PRSMV CHEM ANLYZR: CPT

## 2022-04-25 PROCEDURE — 99285 EMERGENCY DEPT VISIT HI MDM: CPT

## 2022-04-25 PROCEDURE — 80053 COMPREHEN METABOLIC PANEL: CPT

## 2022-04-25 RX ORDER — PRAMIPEXOLE DIHYDROCHLORIDE 0.12 MG/1
0.12 TABLET ORAL 3 TIMES DAILY PRN
Status: ON HOLD | COMMUNITY
End: 2022-04-28 | Stop reason: HOSPADM

## 2022-04-25 RX ORDER — NICOTINE 21 MG/24HR
1 PATCH, TRANSDERMAL 24 HOURS TRANSDERMAL DAILY
Status: DISCONTINUED | OUTPATIENT
Start: 2022-04-25 | End: 2022-04-28 | Stop reason: HOSPADM

## 2022-04-25 RX ORDER — HYDROXYZINE HYDROCHLORIDE 25 MG/1
50 TABLET, FILM COATED ORAL 3 TIMES DAILY PRN
Status: ON HOLD | COMMUNITY
End: 2022-04-28 | Stop reason: HOSPADM

## 2022-04-25 RX ORDER — TRAZODONE HYDROCHLORIDE 50 MG/1
50 TABLET ORAL
Status: ON HOLD | COMMUNITY
End: 2022-04-28 | Stop reason: HOSPADM

## 2022-04-25 RX ORDER — CLONIDINE HYDROCHLORIDE 0.2 MG/1
0.2 TABLET ORAL 3 TIMES DAILY PRN
Status: ON HOLD | COMMUNITY
End: 2022-04-28 | Stop reason: HOSPADM

## 2022-04-25 RX ORDER — ONDANSETRON 4 MG/1
4 TABLET, FILM COATED ORAL 3 TIMES DAILY PRN
Status: ON HOLD | COMMUNITY
End: 2022-04-28 | Stop reason: HOSPADM

## 2022-04-25 ASSESSMENT — PATIENT HEALTH QUESTIONNAIRE - PHQ9: SUM OF ALL RESPONSES TO PHQ QUESTIONS 1-9: 17

## 2022-04-25 NOTE — ED NOTES
Presenting Problem:Patient presents to [facility] ED voluntarily from [facility] after reporting that his suicidal ideation and depression has increased in severity since being admitted to [facility] related to marijuana abuse. Appearance/Hygiene:  ill-appearing, hospital attire, lying in bed, fair grooming and fair hygiene   Motor Behavior: The patient is fidgeting and seems to have excess energy. The patient is cooperative, and non-aggressive. Attitude: cooperative  Affect: anxiety   Speech: pressured and rapid  Mood: patient reported his mood as anxious and depressed  Thought Processes: Goal directed and Flight of ideas  Perceptions: Absent   Thought content: The patient denies AVH and HI. The patient endorses SI c no plan. The patient stated, \"I just want to die\". The patient reports that he has had increased feelings of depression and anxiety since admitted to [facility]. When the patient was asked why the recovery center worsened his symptoms the patient stated, \"that place just made it so much worse. That place is not for me\". The patient is fixated on being admitted to [facility] and having a nicotine patch ordered for him (patch was ordered by ED MD). The patient is unable to stay on topic during the interview and would need the question asked multiple times related to asking for a nicotine patch, asking about Nehal MÉNDEZ, asking when he was going upstairs, and asking if he would get a taxi if discharged. The patient was educated on the importance of the evaluation, and the process or admission or discharge. Orientation: A&Ox4   Memory: intact  Concentration: Poor    Insight/ judgement: Impaired insight and judgment      Psychosocial and contextual factors: The patient was brought to [facility] ED from [facility]--the patient reports he was admitted for marijuana abuse, but explains that he had a medical marijuana card when using the marijuana.  The patient reports that his card had  and his mother would not allow him to go and get a new card. The patient denies any substance abuse, other than marijuana. The pt endorses smoking 1-2pack/day of cigarettes. The patient reprots that he has symptoms of depression including hopelessness, irritability, sadness, and lonliness. The patient also endorses anxiety symptoms including constant worrying, and fidgeting (the patient is unaware of the cause of his anxiety). The patient reports he has had symptoms of depression and anxiety over the past month, but that his symptoms increased after being admitted to [facility]. The patient reports that his appetite has been typical and that he eat at least 2 meals/day. The patient reports his sleep has been less than typical with only sleeping 5-6 hours per night. The patient denies any hx of abuse. The patient is currently on disability, and is unable to be employed related to his mental health. The patient is currently living with his mother and father, and he reports he feels safe in his current home. The patient continues to endorse suicidal ideation. The patient has a hx of a suicide attempt via hanging at the age of 15yo and that he has a past hx of self-harming behaviors. The patient denies any self-harming at this time. The patient yany for safety while in the hospital. The patient is asking to be admitted at this time. When asked why he would like to be admitted to [facility] the patient replied, \"I just feel unstable I just dont know what I'm gonna do\". C-SSRS flowsheet is  Complete.     Psychiatric History (including current outpatient provider and past inpatient admissions): Bipolar I Disorder, Autistic spectrum disorder, Marijuana abuse    Inpatient:   · [facility] -: [facility] was admitted for marijuana substance abuse  · [facility] -22 (Nehal):  Medication Management  · [facility] -22 (Zachary Wooten): Panic Attacks    Outpatient: Patient has no outpatient follow-up in place and does not remember the last time he had a follow-up appointment with an outpatient provider. Medications: Patient reports he is currently taking Invega MCDONOUGH and that he received his dose for last month, but does not know when his next dosage is due. Access to Firearms: Patient denies any access to weapons or firearms. ASSESSMENT FOR IMMINENT FUTURE DANGER:    RISK FACTORS:    []  Age <25 or >49   [x]  Male gender   [x]  Depressed mood   [x]  Active suicidal ideation   []  Suicide plan   []  Suicide attempt   []  Access to lethal means   [x]  Prior suicide attempt-patient reports a suicide attempt at the age of 14   [x]  Active substance abuse (marijuana)   [x]  Highly impulsive behaviors   []  Not attending to self-care/ADLs    []  Recent significant loss   []  Chronic pain or medical illness   [x]  Social isolation   []  History of violence (if yes please elaborate )   []  Active psychosis   [x]  Cognitive impairment    [x]  No outpatient services in place   []  Medication noncompliance   [x]  No collateral information to support safety  [] Self- injurious/ Self-harm behavior    PROTECTIVE FACTORS:  [x] Age >25 and <55  [] Female gender   [] Denies depression  [] Denies suicidal ideation  [x] Does not have lethal plan   [x] Does not have access to guns or weapons  [x] Patient is verbally yany for safety-while in the hospital  [] No prior suicide attempts  [] No active substance abuse  [] Patient has social or family support  [] No active psychosis or cognitive dysfunction  [x] Physically healthy  [] Has outpatient services in place  [] Compliant with recommended medications  [] Collateral information from  supports patient safety   [] Patient is future oriented with plans to live his life any feel better.               Bradley Biggs RN  04/25/22 2100       Bradley Biggs RN  04/25/22 [id number]

## 2022-04-25 NOTE — PROGRESS NOTES
Pt arrived via squad for suicidal ideations from [facility]. VSS and placed in gown. Taken to [facility].

## 2022-04-25 NOTE — ED NOTES
Pt brought back to the [facility] for evaluation. Pt already in safety gown. Belongings checked and secured in locker.       JULISA Yates  04/25/22 [id number]

## 2022-04-26 PROBLEM — F25.0 SCHIZOAFFECTIVE DISORDER, BIPOLAR TYPE (HCC): Status: ACTIVE | Noted: 2022-04-26

## 2022-04-26 LAB — TSH SERPL DL<=0.05 MIU/L-ACNC: 0.97 UIU/ML (ref 0.27–4.2)

## 2022-04-26 PROCEDURE — 6360000002 HC RX W HCPCS: Performed by: PSYCHIATRY & NEUROLOGY

## 2022-04-26 PROCEDURE — 1240000000 HC EMOTIONAL WELLNESS R&B

## 2022-04-26 PROCEDURE — 99223 1ST HOSP IP/OBS HIGH 75: CPT | Performed by: PSYCHIATRY & NEUROLOGY

## 2022-04-26 PROCEDURE — 6370000000 HC RX 637 (ALT 250 FOR IP): Performed by: STUDENT IN AN ORGANIZED HEALTH CARE EDUCATION/TRAINING PROGRAM

## 2022-04-26 PROCEDURE — 6370000000 HC RX 637 (ALT 250 FOR IP): Performed by: PSYCHIATRY & NEUROLOGY

## 2022-04-26 PROCEDURE — 6370000000 HC RX 637 (ALT 250 FOR IP)

## 2022-04-26 PROCEDURE — 99221 1ST HOSP IP/OBS SF/LOW 40: CPT

## 2022-04-26 RX ORDER — ACETAMINOPHEN 325 MG/1
650 TABLET ORAL EVERY 4 HOURS PRN
Status: DISCONTINUED | OUTPATIENT
Start: 2022-04-26 | End: 2022-04-28 | Stop reason: HOSPADM

## 2022-04-26 RX ORDER — POLYETHYLENE GLYCOL 3350 17 G
2 POWDER IN PACKET (EA) ORAL
Status: DISCONTINUED | OUTPATIENT
Start: 2022-04-26 | End: 2022-04-28 | Stop reason: HOSPADM

## 2022-04-26 RX ORDER — OLANZAPINE 10 MG/1
10 TABLET ORAL EVERY 8 HOURS PRN
Status: DISCONTINUED | OUTPATIENT
Start: 2022-04-26 | End: 2022-04-28 | Stop reason: HOSPADM

## 2022-04-26 RX ORDER — LORAZEPAM 1 MG/1
1 TABLET ORAL ONCE
Status: COMPLETED | OUTPATIENT
Start: 2022-04-26 | End: 2022-04-26

## 2022-04-26 RX ORDER — MAGNESIUM HYDROXIDE/ALUMINUM HYDROXICE/SIMETHICONE 120; 1200; 1200 MG/30ML; MG/30ML; MG/30ML
30 SUSPENSION ORAL EVERY 6 HOURS PRN
Status: DISCONTINUED | OUTPATIENT
Start: 2022-04-26 | End: 2022-04-28 | Stop reason: HOSPADM

## 2022-04-26 RX ORDER — DIPHENHYDRAMINE HYDROCHLORIDE 50 MG/ML
50 INJECTION INTRAMUSCULAR; INTRAVENOUS EVERY 4 HOURS PRN
Status: DISCONTINUED | OUTPATIENT
Start: 2022-04-26 | End: 2022-04-28 | Stop reason: HOSPADM

## 2022-04-26 RX ORDER — HYDROXYZINE 50 MG/1
50 TABLET, FILM COATED ORAL 3 TIMES DAILY PRN
Status: DISCONTINUED | OUTPATIENT
Start: 2022-04-26 | End: 2022-04-28 | Stop reason: HOSPADM

## 2022-04-26 RX ORDER — IBUPROFEN 400 MG/1
400 TABLET ORAL EVERY 6 HOURS PRN
Status: DISCONTINUED | OUTPATIENT
Start: 2022-04-26 | End: 2022-04-28 | Stop reason: HOSPADM

## 2022-04-26 RX ORDER — TRAZODONE HYDROCHLORIDE 50 MG/1
50 TABLET ORAL NIGHTLY PRN
Status: DISCONTINUED | OUTPATIENT
Start: 2022-04-26 | End: 2022-04-28 | Stop reason: HOSPADM

## 2022-04-26 RX ADMIN — TRAZODONE HYDROCHLORIDE 50 MG: 50 TABLET ORAL at 21:08

## 2022-04-26 RX ADMIN — NICOTINE POLACRILEX 2 MG: 2 GUM, CHEWING BUCCAL at 20:01

## 2022-04-26 RX ADMIN — NICOTINE POLACRILEX 2 MG: 2 LOZENGE ORAL at 15:37

## 2022-04-26 RX ADMIN — PALIPERIDONE PALMITATE 234 MG: 234 INJECTION INTRAMUSCULAR at 12:52

## 2022-04-26 RX ADMIN — HYDROXYZINE HYDROCHLORIDE 50 MG: 50 TABLET, FILM COATED ORAL at 20:01

## 2022-04-26 RX ADMIN — NICOTINE POLACRILEX 2 MG: 2 LOZENGE ORAL at 12:53

## 2022-04-26 RX ADMIN — NICOTINE POLACRILEX 2 MG: 2 GUM, CHEWING BUCCAL at 21:22

## 2022-04-26 RX ADMIN — NICOTINE POLACRILEX 2 MG: 2 LOZENGE ORAL at 08:52

## 2022-04-26 RX ADMIN — NICOTINE POLACRILEX 2 MG: 2 LOZENGE ORAL at 10:09

## 2022-04-26 RX ADMIN — NICOTINE POLACRILEX 2 MG: 2 LOZENGE ORAL at 17:24

## 2022-04-26 RX ADMIN — NICOTINE POLACRILEX 2 MG: 2 LOZENGE ORAL at 19:04

## 2022-04-26 RX ADMIN — LORAZEPAM 1 MG: 1 TABLET ORAL at 12:53

## 2022-04-26 ASSESSMENT — SLEEP AND FATIGUE QUESTIONNAIRES
DO YOU HAVE DIFFICULTY SLEEPING: NO
DO YOU USE A SLEEP AID: NO
AVERAGE NUMBER OF SLEEP HOURS: 7
DO YOU USE A SLEEP AID: NO
DO YOU HAVE DIFFICULTY SLEEPING: NO
AVERAGE NUMBER OF SLEEP HOURS: 7

## 2022-04-26 ASSESSMENT — PATIENT HEALTH QUESTIONNAIRE - PHQ9
SUM OF ALL RESPONSES TO PHQ QUESTIONS 1-9: 16
SUM OF ALL RESPONSES TO PHQ QUESTIONS 1-9: 16

## 2022-04-26 ASSESSMENT — LIFESTYLE VARIABLES: HOW OFTEN DO YOU HAVE A DRINK CONTAINING ALCOHOL: NEVER
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
## 2022-04-26 NOTE — FLOWSHEET NOTE
Purposeful Rounding    Patient Location: Patient room    Patient willing to engage in conversation: Yes    Presentation/behavior: Cooperative and Pleasant    Affect: Neutral/Euthymic(normal)    Concerns reported: none    PRN medications given: none    Environmental assessment: Room free from clutter, Clear path to bathroom , Adequate lighting and No safety hazards noted    Fall prevention interventions in place: Yellow non-skid socks on        Daily Laws Fall Risk Score: 15      Electronically signed by Bria Hensley RN on 4/26/22 at 3:23 PM EDT
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
## 2022-04-26 NOTE — ED NOTES
Patient resting in room with lights off, no requests or complaints at this time.       Markus Bolanos RN  04/25/22 1003

## 2022-04-26 NOTE — GROUP NOTE
Group Therapy Note    Date: 4/26/2022    Group Start Time: 1100  Group End Time: 9976  Group Topic: Cognitive Skills    [id number] [facility]        Group Therapy Note    Attendees: 6    Group members were asked to write out their names and put a strength for every letter of their name. Notes:  Norman attended group for part of the time. Saman Al stated that he needed to excuse himself due to a stomach ache.      Status After Intervention:  Unchanged    Participation Level: Minimal    Participation Quality: Lethargic      Speech:  mute      Thought Process/Content: Linear      Affective Functioning: Flat      Mood: depressed      Level of consciousness:  Alert      Response to Learning: Able to verbalize current knowledge/experience      Endings: None Reported    Modes of Intervention: Socialization, Exploration and Activity      Discipline Responsible: Behavorial Health Tech      Signature:  ALBERTO Lazo

## 2022-04-26 NOTE — ED NOTES
Patient resting, patient updated on patient care and signed application for voluntary admission.       Abbi Aguirre RN  04/26/22 0214

## 2022-04-26 NOTE — FLOWSHEET NOTE
Purposeful Rounding    Patient Location: Nurses station    Patient willing to engage in conversation: Yes    Presentation/behavior: Controlled, Cooperative and Pleasant    Affect: Neutral/Euthymic(normal)    Concerns reported: none    PRN medications given: none    Environmental assessment: Room free from clutter, Clear path to bathroom , Adequate lighting and No safety hazards noted    Fall prevention interventions in place: Yellow non-skid socks on        Daily Laws Fall Risk Score: 15      Electronically signed by Vijay Corona RN on 4/26/22 at 1:09 PM EDT

## 2022-04-26 NOTE — H&P
Hospital Medicine History & Physical      PCP: SHAILA Gan - CNP    Date of Admission: 4/25/2022    Date of Service: Pt seen/examined on 4/26/2022     Chief Complaint:    Chief Complaint   Patient presents with    Depression     Being at [address] was causing more depression and suicidal thoughts. History Of Present Illness: The patient is a 33 y.o. male with pmhx of MR, bipolar disorder, anxiety, and ADHD who presented to [facility] ED for depression and suicidal ideation. Patient was seen and evaluated in the ED by the ED medical provider, patient was medically cleared for admission to [facility]. This note serves as an admission medical H&P. Tobacco use: Current, every day, . 5 ppd   ETOH use: Not currently   Illicit drug use: Marijuana     Patient denies any medical complaints     Past Medical History:        Diagnosis Date    ADHD     Adult respiratory distress syndrome ([facility]     ICU     Anxiety     Bipolar disorder     Cognitive disorder     Finger fracture, left 11/22/2017    GERD (gastroesophageal reflux disease)     Hiatal hernia 01/19/2018    Hypertension     Kidney stones     MR (mental retardation), moderate     Nausea and vomiting     Pneumococcal pneumonia (HCC)     Seizures ([facility]     pt denied any seizures ever       Past Surgical History:        Procedure Laterality Date    MOUTH SURGERY      MOUTH SURGERY         Medications Prior to Admission:    Prior to Admission medications    Medication Sig Start Date End Date Taking?  Authorizing Provider   hydrOXYzine (ATARAX) 25 MG tablet Take 50 mg by mouth 3 times daily as needed for Itching or Anxiety   Yes Historical Provider, MD   cloNIDine (CATAPRES) 0.2 MG tablet Take 0.2 mg by mouth 3 times daily as needed   Yes Historical Provider, MD   traZODone (DESYREL) 50 MG tablet Take 50 mg by mouth nightly as needed for Sleep or Depression   Yes Historical Provider, MD   pramipexole (MIRAPEX) 0.125 MG tablet Take 0.125 mg by mouth 3 times daily as needed 1-2 tabs   Yes Historical Provider, MD   ondansetron (ZOFRAN) 4 MG tablet Take 4 mg by mouth 3 times daily as needed for Nausea or Vomiting   Yes Historical Provider, MD   paliperidone palmitate ER (INVEGA SUSTENNA) 234 MG/1.5ML MADDI IM injection Inject 234 mg into the muscle once for 1 dose 3/14/22 3/14/22  Tomy Sandhu MD       Allergies:  Gabapentin, Seroquel [quetiapine], and Trazodone and nefazodone    Social History:      TOBACCO:   reports that he has been smoking cigarettes. He has a 5.00 pack-year smoking history. His smokeless tobacco use includes chew. ETOH:   reports previous alcohol use. Family History:   Positive as follows:        Problem Relation Age of Onset    Depression Mother     Mental Illness Maternal Uncle     Arthritis Paternal Grandmother        REVIEW OF SYSTEMS:       Constitutional: Negative for fever   HENT: Negative for sore throat   Eyes: Negative for redness   Respiratory: Negative  for dyspnea, cough   Cardiovascular: Negative for chest pain   Gastrointestinal: Negative for vomiting, diarrhea   Genitourinary: Negative for hematuria   Musculoskeletal: Negative for arthralgias   Skin: Negative for rash   Neurological: Negative for syncope    Hematological: Negative for easy bruising/bleeding   Psychiatric/Behavorial: Per psychiatry team evaluation     PHYSICAL EXAM:    /67   Pulse 73   Temp 98.4 °F (36.9 °C) (Temporal)   Resp 16   Ht 6' (1.829 m)   Wt 243 lb 8 oz (110.5 kg)   SpO2 98%   BMI 33.02 kg/m²     Gen: No distress. Alert. Eyes: PERRL. No sclera icterus. No conjunctival injection. Neck: No JVD. No Carotid Bruit. Trachea midline. Resp: No accessory muscle use. No crackles. No wheezes. No rhonchi. CV: Regular rate. Regular rhythm. No murmur. No rub. No edema. GI: Non-tender. Non-distended. Normal bowel sounds. Skin: Warm and dry. No nodule on exposed extremities.  No rash on exposed extremities. M/S: No cyanosis. No joint deformity. No clubbing. Neuro: Awake. No focal neurologic deficit on exam.  Cranial nerves II through XII intact. Patient is able to ambulate without difficulty. Psych: Per psychiatry team evaluation     Lab Results   Component Value Date    WBC 8.3 04/25/2022    HGB 14.5 04/25/2022    HCT 42.0 04/25/2022    MCV 89.8 04/25/2022     04/25/2022     Lab Results   Component Value Date     04/25/2022    K 4.1 04/25/2022     04/25/2022    CO2 30 04/25/2022    BUN 11 04/25/2022    CREATININE 0.9 04/25/2022    GLUCOSE 97 04/25/2022    CALCIUM 9.5 04/25/2022    PROT 7.0 04/25/2022    LABALBU 4.4 04/25/2022    BILITOT 0.7 04/25/2022    ALKPHOS 67 04/25/2022    AST 17 04/25/2022    ALT 20 04/25/2022    LABGLOM >60 04/25/2022    GFRAA >60 04/25/2022    AGRATIO 1.7 04/25/2022    GLOB 3.4 10/04/2018       U/A:    Lab Results   Component Value Date    NITRITE Neg 06/18/2014    COLORU Yellow 01/12/2022    WBCUA 3-5 05/09/2016    RBCUA 0-2 05/09/2016    MUCUS 1+ 05/09/2016    BACTERIA 1+ 05/09/2016    CLARITYU Clear 01/12/2022    SPECGRAV <=1.005 01/12/2022    LEUKOCYTESUR Negative 01/12/2022    BLOODU Negative 01/12/2022    GLUCOSEU Negative 01/12/2022    GLUCOSEU NEGATIVE 02/11/2010       CULTURES  COVID and Flu not detected     RADIOLOGY  No orders to display       ASSESSMENT/PLAN:  #bipolar disorder   #anxiety   - per psychiatry team    #mental retardation   -supportive care    #cannabis use   -counseled on cessation       Pt has no medical complaints at this time. They were informed that should a medical concern arise during their admission they may have [facility] contact us.         Lori Keller   4/26/2022 1:27 PM
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
## 2022-04-26 NOTE — FLOWSHEET NOTE
04/26/22 1025   Activities of Daily Living   Patient Requires assistance with daily self-care activities?  No   Leisure Activity 1   3 Favorite Leisure Activities hiking   Frequency weekly   Last time   (last month)   Barriers to participating    (weather)   Leisure Activity 2   [address]  camping   Frequency  several times a year   Last time    (couple years ago)   Barriers to participating    (no issues)   Leisure Activity 3   [address]  fishing   Frequency  several times a year   Last time    (been a few years)   Barriers to participating  motivation   Social   Patient reports spending the majority of their free time with a group  (with parents)   Patient verbalizes a preference for spending free time with a group  (with parents)   Patients perception of support system healthy/strong   Patients perception of barriers to socializing with others include(s) no perceived barriers   Beliefs & Coping   Has difficulty dealing with feelings   No   Internalizes feelings/Keeps feelings in Yes   Externalizes feelings through aggressiveness or poor temper control  No   Feels uncomfortable around others  Yes   Has difficulty talking to others  Yes   Depends on others for direction or decisions Yes   Difficulty dealing with anger of others  Yes   Difficulty dealing with own anger  No   Difficulty managing stress Yes   Frequently has difficulty with relationships  Yes   which,who,where   (significant others)   Has recently perceived/experienced loss, disappointment, humiliation or failure  NO   General perception about self likes self   Attitude about abilities occasionally fails   Locus of Control  sometimes   Belief about recovery Recovery is possible   Patient Identified Strengths  living another day and not taking my life   Patient Identified Limitations  living with my depression   Perception of most stressful event prior to hospitalization being bullied at [address] of changes needed get help here I need and be in safe environment   Strengths and Limitations   Strengths Independent in basic self-care activities; General positive attitude toward future and recovery; Positive leisure interests;Demonstrates basic social skills; Positive support network   Limitations Difficult relationships / poor social skills; External locus of control; Difficulty problem solving/relies on others to help solve problems

## 2022-04-26 NOTE — ED NOTES
Patient resting with lights off, no requests or complaints at this time.       Isabel Sen RN  04/26/22 [id number]

## 2022-04-26 NOTE — ED NOTES
Level of Care Disposition: Admit      Patient was seen by ED provider and [facility]IL[facility] OF [facility] staff. The case presented to psychiatric provider on-call Any Hastings MD.  Based on the ED evaluation and information presented to the provider by Jazz Stewart it is the recommendation of the on call psychiatric provider that inpatient hospitalization is the least restrictive environment for the patient at this time. The patient will be admitted to the inpatient unit. Admitting provider did not order suicide precautions based on patient yany for safety.              Liane Sequeira RN  04/25/22 [id number]

## 2022-04-26 NOTE — BH NOTE
[facility]  Admission Note     Admission Type:   Admission Type: Voluntary    Reason for admission:  Reason for Admission: Depression with suicidal ideation    PATIENT STRENGTHS: Spiritual, Support of family        Patient Strengths and Limitations:        Addictive Behavior:   Addictive Behavior  In the Past 3 Months, Have You Felt or Has Someone Told You That You Have a Problem With  : Other (comment) (Marijuana)    Medical Problems:   Past Medical History:   Diagnosis Date    ADHD     Adult respiratory distress syndrome ([facility]     ICU     Anxiety     Bipolar disorder     Cognitive disorder     Finger fracture, left 11/22/2017    GERD (gastroesophageal reflux disease)     Hiatal hernia 01/19/2018    Hypertension     Kidney stones     MR (mental retardation), moderate     Nausea and vomiting     Pneumococcal pneumonia ([facility]     Seizures ([facility] [address] [facility]     pt denied any seizures ever       Status EXAM:  Mental Status and Behavioral Exam  Normal: No  Level of Assistance: Independent/Self  Facial Expression: Expressionless  Affect: Constricted  Level of Consciousness: Alert  Frequency of Checks: 4 times per hour, close  Mood:Normal: No  Mood: Depressed,Anxious  Motor Activity:Normal: No  Motor Activity: Increased  Eye Contact: Fair  Observed Behavior: Guarded,Hypermobile,Preoccupied,Cooperative  Sexual Misconduct History: Current - no  Preception: [place] to person,[place] to time,[place] to place  Attention:Normal: No  Attention: Distractible  Thought Processes: Circumstantial  Thought Content:Normal: No  Thought Content: Preoccupations  Depression Symptoms: Increased irritability,Change in energy level,Isolative,Feelings of helplessness,Feelings of worthlessness,Feelings of hopelessess  Anxiety Symptoms: Generalized  Kayli Symptoms: Pressured speech,Increased energy,Flight of ideas  Hallucination Type:  (Patient denies.)  Hallucinations: None  Delusions: No  Memory:Normal: No  Memory: Poor recent  Insight and Judgment: No  Insight and Judgment: Poor judgment,Poor insight    Tobacco Screening:  Practical Counseling, on admission, benedicto X, if applicable and completed (first 3 are required if patient doesn't refuse): (x )  Recognizing danger situations (included triggers and roadblocks)                    (x )  Coping skills (new ways to manage stress, exercise, relaxation techniques, changing routine, distraction)                                                           ( )  Basic information about quitting (benefits of quitting, techniques in how to quit, available resources  ( ) Referral for counseling faxed to [facility]                                           (x ) Patient refused counseling  ( ) Patient has not smoked in the last 30 days    Metabolic Screening:    Lab Results   Component Value Date    LABA1C 4.9 03/03/2022       Lab Results   Component Value Date    CHOL 176 03/03/2022    CHOL 170 01/09/2022     Lab Results   Component Value Date    TRIG 114 03/03/2022    TRIG 72 01/09/2022     Lab Results   Component Value Date    HDL 64 (H) 03/03/2022    HDL 63 (H) 01/09/2022     No components found for: LDLCAL  Lab Results   Component Value Date    LABVLDL 23 03/03/2022    LABVLDL 14 01/09/2022         Body mass index is 31.19 kg/m². BP Readings from Last 2 Encounters:   04/26/22 126/82   03/07/22 120/85           Pt admitted with followings belongings:  Dental Appliances: Uppers,Lowers  Vision - Corrective Lenses: None  Hearing Aid: None  Jewelry: None  Body Piercings Removed: No  Clothing: Footwear,Jacket/Coat,Pants,Shirt,Undergarments,Socks  Other Valuables: Wallet (multiple cards, license, insurance card , medical marijuana card)     Patient's home medications Not available. Patient oriented to surroundings and program expectations and copy of patient rights given. Received admission packet:  Yes  Consents reviewed, signed Yes.  Refused No. Patient verbalize understanding:  Yes. Patient education on precautions:  Yes                   Ashlee Jhaveri RN

## 2022-04-26 NOTE — PROGRESS NOTES
Behavioral Services  Medicare Certification Upon Admission    I certify that this patient's inpatient psychiatric hospital admission is medically necessary for:    [x] (1) Treatment which could reasonably be expected to improve this patient's condition,       [x] (2) Or for diagnostic study;     AND     [x](2) The inpatient psychiatric services are provided while the individual is under the care of a physician and are included in the individualized plan of care.     Estimated length of stay/service 3-5 d    Plan for post-hospital care outpt    Electronically signed by Onelia Louie MD on 4/26/2022 at 11:31 AM

## 2022-04-26 NOTE — PROGRESS NOTES
pts mom Dania Del Real called, pt signed form for us to speak to her, mom informed that pt was brought in from [facility] with Suicidal Ideation, mom states pt \"addicted to marijuana and had a medical marijuana card until I called and they turned it off\", mom says Gabriela Durant can come home to live if he gets the help he needs, she is worried because Norman smokes and her  is now on oxygen. Dr Jackie Ray is aware of the conversation had with Shashi mother.

## 2022-04-26 NOTE — PROGRESS NOTES
Pt arrived on unit via [facility] AN [facility]FFILIATE OF [facility] staff and security. V.s.s. snack and beverage provided. Oriented to room and unit. Denies immediate needs.

## 2022-04-26 NOTE — ED NOTES
Patient lying in bed, patient given boxed lunch and apple juice upon request. No complaints or further requests at this time.       Krystal Locke RN  04/25/22 [id number]
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
## 2022-04-26 NOTE — ED NOTES
Patient transferred admitted unit [facility]. Patient currently in safety gown. Belongings and paperwork transferred with patient to admitted unit. Patient taken to [facility] via wheelchair and was escorted by this RN and .      Abbi Aguirre RN  04/26/22 0485

## 2022-04-26 NOTE — ED NOTES
Patient resting in bed with lights off. No complaints or requests at this time. Patient has no signs of distress.       Alan Smith RN  04/26/22 0940

## 2022-04-26 NOTE — ED NOTES
Patient resting in bed with lights off, no requests or complaints at this time.       Javid Ochoa RN  04/25/22 3360

## 2022-04-27 LAB
CHOLESTEROL, TOTAL: 216 MG/DL (ref 0–199)
HDLC SERPL-MCNC: 56 MG/DL (ref 40–60)
LDL CHOLESTEROL CALCULATED: 132 MG/DL
TRIGL SERPL-MCNC: 139 MG/DL (ref 0–150)
VLDLC SERPL CALC-MCNC: 28 MG/DL

## 2022-04-27 PROCEDURE — 6370000000 HC RX 637 (ALT 250 FOR IP): Performed by: PSYCHIATRY & NEUROLOGY

## 2022-04-27 PROCEDURE — 6370000000 HC RX 637 (ALT 250 FOR IP)

## 2022-04-27 PROCEDURE — 1240000000 HC EMOTIONAL WELLNESS R&B

## 2022-04-27 PROCEDURE — 99233 SBSQ HOSP IP/OBS HIGH 50: CPT | Performed by: PSYCHIATRY & NEUROLOGY

## 2022-04-27 PROCEDURE — 6370000000 HC RX 637 (ALT 250 FOR IP): Performed by: STUDENT IN AN ORGANIZED HEALTH CARE EDUCATION/TRAINING PROGRAM

## 2022-04-27 RX ORDER — LORAZEPAM 1 MG/1
1 TABLET ORAL DAILY PRN
Status: DISCONTINUED | OUTPATIENT
Start: 2022-04-27 | End: 2022-04-28 | Stop reason: HOSPADM

## 2022-04-27 RX ADMIN — LORAZEPAM 1 MG: 1 TABLET ORAL at 15:33

## 2022-04-27 RX ADMIN — NICOTINE POLACRILEX 2 MG: 2 LOZENGE ORAL at 07:28

## 2022-04-27 RX ADMIN — HYDROXYZINE HYDROCHLORIDE 50 MG: 50 TABLET, FILM COATED ORAL at 11:04

## 2022-04-27 RX ADMIN — NICOTINE POLACRILEX 2 MG: 2 LOZENGE ORAL at 12:03

## 2022-04-27 RX ADMIN — NICOTINE POLACRILEX 2 MG: 2 LOZENGE ORAL at 11:04

## 2022-04-27 RX ADMIN — NICOTINE POLACRILEX 2 MG: 2 LOZENGE ORAL at 15:33

## 2022-04-27 RX ADMIN — NICOTINE POLACRILEX 2 MG: 2 LOZENGE ORAL at 19:26

## 2022-04-27 RX ADMIN — NICOTINE POLACRILEX 2 MG: 2 LOZENGE ORAL at 20:20

## 2022-04-27 RX ADMIN — NICOTINE POLACRILEX 2 MG: 2 GUM, CHEWING BUCCAL at 18:26

## 2022-04-27 RX ADMIN — NICOTINE POLACRILEX 2 MG: 2 LOZENGE ORAL at 14:36

## 2022-04-27 RX ADMIN — NICOTINE POLACRILEX 2 MG: 2 LOZENGE ORAL at 21:28

## 2022-04-27 RX ADMIN — NICOTINE POLACRILEX 2 MG: 2 LOZENGE ORAL at 12:59

## 2022-04-27 RX ADMIN — NICOTINE POLACRILEX 2 MG: 2 LOZENGE ORAL at 10:09

## 2022-04-27 RX ADMIN — NICOTINE POLACRILEX 2 MG: 2 GUM, CHEWING BUCCAL at 09:59

## 2022-04-27 RX ADMIN — NICOTINE POLACRILEX 2 MG: 2 GUM, CHEWING BUCCAL at 08:17

## 2022-04-27 RX ADMIN — NICOTINE POLACRILEX 2 MG: 2 LOZENGE ORAL at 17:28

## 2022-04-27 NOTE — PROGRESS NOTES
Department of Psychiatry  AttendingProgress Note  Chief Complaint: depression  Sonia Washington is doing relatively well. He is in bed and feeling physically ill today . He received his [place]. Appears more subdued this admssion without the pressured speech nor intrusiveness. Discussed possible dc 4/28. Patient's chart was reviewed and collaborated with  about the treatment plan. SUBJECTIVE:    Patient is feeling better. Suicidal ideation:  denies suicidal ideation. Patient does not have medication side effects. ROS: Patient has new complaints: no  Sleeping adequately:  Yes   Appetite adequate: Yes  Attending groups: Yes  Visitors:No    OBJECTIVE    Physical  VITALS:  /81   Pulse 75   Temp 98.1 °F (36.7 °C) (Temporal)   Resp 17   Ht 6' (1.829 m)   Wt 243 lb 8 oz (110.5 kg)   SpO2 99%   BMI 33.02 kg/m²     Mental Status Examination:  Patients appearance was street clothes. Thoughts are Goal directed. Homicidal ideations none. No abnormal movements, tics or mannerisms. Memory intact Aims 0. Concentration Good. Alert and oriented X 4. Insight and Judgement impaired insight. Patient was cooperative.  Patient gait normal. Mood constricted, affect depressed affect Hallucinations Absent, suicidal ideations no specific plan to harm self Speech normal volume  Data  Labs:   Admission on 04/25/2022   Component Date Value Ref Range Status    WBC 04/25/2022 8.3  4.0 - 11.0 K/uL Final    RBC 04/25/2022 4.68  4.20 - 5.90 M/uL Final    Hemoglobin 04/25/2022 14.5  13.5 - 17.5 g/dL Final    Hematocrit 04/25/2022 42.0  40.5 - 52.5 % Final    MCV 04/25/2022 89.8  80.0 - 100.0 fL Final    MCH 04/25/2022 30.9  26.0 - 34.0 pg Final    MCHC 04/25/2022 34.4  31.0 - 36.0 g/dL Final    RDW 04/25/2022 12.5  12.4 - 15.4 % Final    Platelets 53/25/0394 216  135 - 450 K/uL Final    MPV 04/25/2022 7.1  5.0 - 10.5 fL Final    Neutrophils % 04/25/2022 52.1  % Final    Lymphocytes % 04/25/2022 33.6  % Final    Monocytes % 04/25/2022 10.2  % Final    Eosinophils % 04/25/2022 3.3  % Final    Basophils % 04/25/2022 0.8  % Final    Neutrophils Absolute 04/25/2022 4.3  1.7 - 7.7 K/uL Final    Lymphocytes Absolute 04/25/2022 2.8  1.0 - 5.1 K/uL Final    Monocytes Absolute 04/25/2022 0.8  0.0 - 1.3 K/uL Final    Eosinophils Absolute 04/25/2022 0.3  0.0 - 0.6 K/uL Final    Basophils Absolute 04/25/2022 0.1  0.0 - 0.2 K/uL Final    Sodium 04/25/2022 139  136 - 145 mmol/L Final    Potassium reflex Magnesium 04/25/2022 4.1  3.5 - 5.1 mmol/L Final    Chloride 04/25/2022 101  99 - 110 mmol/L Final    CO2 04/25/2022 30  21 - 32 mmol/L Final    Anion Gap 04/25/2022 8  3 - 16 Final    Glucose 04/25/2022 97  70 - 99 mg/dL Final    BUN 04/25/2022 11  7 - 20 mg/dL Final    CREATININE 04/25/2022 0.9  0.9 - 1.3 mg/dL Final    GFR Non- 04/25/2022 >60  >60 Final    Comment: >60 mL/min/1.73m2 EGFR, calc. for ages 25 and older using the  MDRD formula (not corrected for weight), is valid for stable  renal function.  GFR  04/25/2022 >60  >60 Final    Comment: Chronic Kidney Disease: less than 60 ml/min/1.73 sq.m. Kidney Failure: less than 15 ml/min/1.73 sq.m. Results valid for patients 18 years and older.       Calcium 04/25/2022 9.5  8.3 - 10.6 mg/dL Final    Total Protein 04/25/2022 7.0  6.4 - 8.2 g/dL Final    Albumin 04/25/2022 4.4  3.4 - 5.0 g/dL Final    Albumin/Globulin Ratio 04/25/2022 1.7  1.1 - 2.2 Final    Total Bilirubin 04/25/2022 0.7  0.0 - 1.0 mg/dL Final    Alkaline Phosphatase 04/25/2022 67  40 - 129 U/L Final    ALT 04/25/2022 20  10 - 40 U/L Final    AST 04/25/2022 17  15 - 37 U/L Final    Ethanol Lvl 04/25/2022 None Detected  mg/dL Final    Comment:    None Detected  Conversion factor:  100 mg/dl = .100 g/dl  For Medical Purposes Only      Amphetamine Screen, Urine 04/25/2022 Neg  Negative <1000ng/mL Final    Barbiturate Screen, Ur 04/25/2022 Neg Negative <200 ng/mL Final    Benzodiazepine Screen, Urine 04/25/2022 Neg  Negative <200 ng/mL Final    Cannabinoid Scrn, Ur 04/25/2022 POSITIVE* Negative <50 ng/mL Final    Cocaine Metabolite Screen, Urine 04/25/2022 Neg  Negative <300 ng/mL Final    Opiate Scrn, Ur 04/25/2022 Neg  Negative <300 ng/mL Final    Comment: \"Therapeutic levels of pain medication, especially oxycontin and synthetic  opioids, may not be detected by this Methodology. Pain management screen  panel  Drug panel-PM-Hi Res Ur, Interp (PAIN) should be considered for drug  monitoring \".  PCP Screen, Urine 04/25/2022 Neg  Negative <25 ng/mL Final    Methadone Screen, Urine 04/25/2022 Neg  Negative <300 ng/mL Final    Propoxyphene Scrn, Ur 04/25/2022 Neg  Negative <300 ng/mL Final    Oxycodone Urine 04/25/2022 Neg  Negative <100 ng/ml Final    pH, UA 04/25/2022 5.0   Final    Comment: Urine pH less than 5.0 or greater than 8.0 may indicate sample adulteration. Another sample should be collected if clinically  indicated.  Drug Screen Comment: 04/25/2022 see below   Final    Comment: This method is a screening test to detect only these drug  classes as part of a medical workup. Confirmatory testing  by another method should be ordered if clinically indicated.  SARS-CoV-2 RNA, RT PCR 04/25/2022 NOT DETECTED  NOT DETECTED Final    Comment: Not Detected results do not preclude SARS-CoV-2 infection and  should not be used as the sole basis for patient management  decisions. Results must be combined with clinical observations,  patient history, and epidemiological information. Testing was performed using KRYSTIAN NANCY SARS-CoV-2 and Influenza A/B  nucleic acid assay.  This test is a multiplex Real-Time Reverse  Transcriptase Polymerase Chain Reaction (RT-PCR)-based in vitro  diagnostic test intended for the qualitative detection of nucleic  acids from SARS-CoV-2, influenza A, and influenza B in nasopharyngeal  and nasal swab specimens for use under the [facility] Emergency Use  Authorization (EUA) only. Patient Fact Sheet:  FindDrives.pl  Provider Fact Sheet: FindDrives.pl  EUA: FindDrives.pl  IFU: FindDrives.pl    Methodology:  RT-PCR      INFLUENZA A 04/25/2022 NOT DETECTED  NOT DETECTED Final    INFLUENZA B 04/25/2022 NOT DETECTED  NOT DETECTED Final    TSH 04/25/2022 0.97  0.27 - 4.20 uIU/mL Final            Medications  Current Facility-Administered Medications: acetaminophen (TYLENOL) tablet 650 mg, 650 mg, Oral, Q4H PRN  ibuprofen (ADVIL;MOTRIN) tablet 400 mg, 400 mg, Oral, Q6H PRN  magnesium hydroxide (MILK OF MAGNESIA) 400 MG/5ML suspension 30 mL, 30 mL, Oral, Daily PRN  nicotine polacrilex (COMMIT) lozenge 2 mg, 2 mg, Oral, Q1H PRN  aluminum & magnesium hydroxide-simethicone (MAALOX) 200-200-20 MG/5ML suspension 30 mL, 30 mL, Oral, Q6H PRN  hydrOXYzine (ATARAX) tablet 50 mg, 50 mg, Oral, TID PRN  OLANZapine (ZYPREXA) tablet 10 mg, 10 mg, Oral, Q8H PRN **OR** OLANZapine (ZYPREXA) 10 mg in sterile water 2 mL injection, 10 mg, IntraMUSCular, Q8H PRN  sterile water injection 2.1 mL, 2.1 mL, IntraMUSCular, Q4H PRN  diphenhydrAMINE (BENADRYL) injection 50 mg, 50 mg, IntraMUSCular, Q4H PRN  traZODone (DESYREL) tablet 50 mg, 50 mg, Oral, Nightly PRN  nicotine polacrilex (NICORETTE) gum 2 mg, 2 mg, Oral, Q1H PRN  nicotine (NICODERM CQ) 21 MG/24HR 1 patch, 1 patch, TransDERmal, Daily    ASSESSMENT AND PLAN    Principal Problem:    Schizoaffective disorder, bipolar type (HCC)  Active Problems:    Suicidal ideation    Autistic spectrum disorder  Resolved Problems:    * No resolved hospital problems. *       1. Patient s symptoms   are improving  2. Probable discharge is tomorrow  3. Discharge planning is complete  4. Suicidal ideation is none  5.  Total time with patient was 40 minutes and more than 50 % of that time was spent counseling the patient on their symptoms, treatment and expected goals.

## 2022-04-27 NOTE — GROUP NOTE
Group Therapy Note    Date: 4/27/2022    Group Start Time: 1100  Group End Time: 6057  Group Topic: Psychoeducation    [address] [place] [address]        Group Therapy Note    Topic: Personal Boundaries    Group facilitator led discussion of personal boundaries and how they differ from expectations. Facilitator laid educational framework for rigid, poor, and healthy boundaries, explored various facets of personal life that boundaries apply. Group concluded with attendees setting goals related to their personal boundaries post-d/c. Attendees: 9       Notes: This pt was present and attentive across group session, appropriately engaged with material provided and peers in discussion. Pt verbalized understanding of the topic and material provided. Status After Intervention:  Improved    Participation Level:  Active Listener and Interactive    Participation Quality: Appropriate and Attentive      Speech:  normal      Thought Process/Content: Logical      Affective Functioning: Congruent      Mood: euthymic      Level of consciousness:  Alert and Attentive      Response to Learning: Able to verbalize current knowledge/experience, Able to verbalize/acknowledge new learning and Progressing to goal      Endings: None Reported    Modes of Intervention: Education, Socialization, Exploration and Problem-solving      Discipline Responsible: Psychoeducational Specialist      Signature:  Krystian Cardoza MM, MT-BC

## 2022-04-27 NOTE — PLAN OF CARE
Pt is alert and oriented X4. He is pleasant and cooperative. He is frequently at the nurses station requesting nicotine products. He asks frequently before they are due. He denies SI/HI/AVH. He is not noted to be RTIS. He is looking forward to discharge, stating he will be leaving tomorrow. He denies any needs at this time.

## 2022-04-27 NOTE — PLAN OF CARE
Problem: Anxiety  Goal: Will report anxiety at manageable levels  Description: INTERVENTIONS:  1. Administer medication as ordered  2. Teach and rehearse alternative coping skills  3. Provide emotional support with 1:1 interaction with staff  Outcome: Adequate for Discharge  Quiet. Cooperative. Pleasant.

## 2022-04-27 NOTE — GROUP NOTE
Group Therapy Note    Date: 4/26/2022    Group Start Time: 2040  Group End Time: 2100  Group Topic: Wrap-Up    [facility]        Group Therapy Note    Attendees: Goals and importance of goal setting discussed. Night time milieu activities discussed. Patient's Goal:  Stay positive     Notes:  Successful     Status After Intervention:  Improved    Participation Level:  Active Listener and Interactive    Participation Quality: Appropriate and Attentive      Speech:  normal      Thought Process/Content: Logical  Linear      Affective Functioning: Congruent      Mood: euthymic      Level of consciousness:  Alert and Oriented x4      Response to Learning: Progressing to goal      Endings: None Reported    Modes of Intervention: Support      Discipline Responsible: Behavorial Health Tech      Signature:  Basilio Venegas

## 2022-04-27 NOTE — GROUP NOTE
Group Therapy Note    Date: 4/27/2022    Group Start Time: 1000  Group End Time: 8699  Group Topic: Psychoeducation    [facility]    ALBERTO Chase        Group Therapy Note  Clinician introduced the Anger Iceberg and had the group members list the underlying triggers and physiological responses in the section under the water that other people cannot see. The group members were able to fill in the top of the iceberg that other people can see with their external and verbal/non-verbal responses. The group members decorated their own anger iceberg they malissa as an activity in the group. Attendees: 9         Patient's Goal:      Notes:  Patient was cooperative and engaged in the anger iceberg discussion and activity. He participated fully and shared his triggers with the group.      Status After Intervention:  Improved    Participation Level: Interactive    Participation Quality: Appropriate      Speech:  normal      Thought Process/Content: Linear      Affective Functioning: Constricted/Restricted      Mood: euthymic      Level of consciousness:  Alert      Response to Learning: Able to verbalize current knowledge/experience      Endings: None Reported    Modes of Intervention: Education and Activity      Discipline Responsible: /Counselor      Signature:  ALBERTO Chase

## 2022-04-27 NOTE — GROUP NOTE
Group Therapy Note    Date: 4/27/2022    Group Start Time: 1300  Group End Time: 1350  Group Topic: Cognitive Skills    [id number] [facility]        Group Therapy Note    Attendees: 8       Group members made two teams and competed against each other in multiple games of [address] [employer]. Notes:  Norman attended group for part of the time. During his time in group, Benji Cruz remained engaged and interacted appropriately with other members of the group. Status After Intervention:  Improved    Participation Level:  Active Listener and Interactive    Participation Quality: Appropriate, Attentive and Sharing      Speech:  normal      Thought Process/Content: Logical      Affective Functioning: Congruent       Mood: euthymic      Level of consciousness:  Alert      Response to Learning: Able to verbalize current knowledge/experience      Endings: None Reported    Modes of Intervention: Socialization, Exploration and Activity      Discipline Responsible: Behavorial Health Tech      Signature:  ALBERTO Stephens

## 2022-04-28 VITALS
RESPIRATION RATE: 16 BRPM | DIASTOLIC BLOOD PRESSURE: 77 MMHG | HEIGHT: 72 IN | HEART RATE: 75 BPM | BODY MASS INDEX: 32.98 KG/M2 | OXYGEN SATURATION: 98 % | SYSTOLIC BLOOD PRESSURE: 135 MMHG | TEMPERATURE: 97.8 F | WEIGHT: 243.5 LBS

## 2022-04-28 LAB
ESTIMATED AVERAGE GLUCOSE: 93.9 MG/DL
HBA1C MFR BLD: 4.9 %

## 2022-04-28 PROCEDURE — 99239 HOSP IP/OBS DSCHRG MGMT >30: CPT | Performed by: PSYCHIATRY & NEUROLOGY

## 2022-04-28 PROCEDURE — 5130000000 HC BRIDGE APPOINTMENT

## 2022-04-28 PROCEDURE — 6370000000 HC RX 637 (ALT 250 FOR IP): Performed by: STUDENT IN AN ORGANIZED HEALTH CARE EDUCATION/TRAINING PROGRAM

## 2022-04-28 PROCEDURE — 6370000000 HC RX 637 (ALT 250 FOR IP): Performed by: PSYCHIATRY & NEUROLOGY

## 2022-04-28 RX ORDER — HYDROXYZINE 50 MG/1
50 TABLET, FILM COATED ORAL 3 TIMES DAILY PRN
Qty: 30 TABLET | Refills: 0 | Status: SHIPPED | OUTPATIENT
Start: 2022-04-28 | End: 2022-04-28

## 2022-04-28 RX ORDER — HYDROXYZINE 50 MG/1
50 TABLET, FILM COATED ORAL 3 TIMES DAILY PRN
Qty: 30 TABLET | Refills: 0 | Status: SHIPPED | OUTPATIENT
Start: 2022-04-28 | End: 2022-05-08

## 2022-04-28 RX ADMIN — NICOTINE POLACRILEX 2 MG: 2 LOZENGE ORAL at 10:57

## 2022-04-28 RX ADMIN — NICOTINE POLACRILEX 2 MG: 2 LOZENGE ORAL at 07:55

## 2022-04-28 RX ADMIN — NICOTINE POLACRILEX 2 MG: 2 LOZENGE ORAL at 06:26

## 2022-04-28 RX ADMIN — NICOTINE POLACRILEX 2 MG: 2 LOZENGE ORAL at 10:08

## 2022-04-28 NOTE — GROUP NOTE
Group Therapy Note    Date: 4/27/2022    Group Start Time: 2050  Group End Time: 2120  Group Topic: Wrap-Up    [facility]    Caro Greenwood LPN        Group Therapy Note    Attendees: 13         Patient's Goal:  Hopeful and positive    Notes:  Goal met    Status After Intervention:  Improved    Participation Level:  Active Listener    Participation Quality: Appropriate      Speech:  normal      Thought Process/Content: Linear      Affective Functioning: Congruent      Mood: WNL      Level of consciousness:  Alert      Response to Learning: Progressing to goal      Endings: None Reported    Modes of Intervention: Support      Discipline Responsible: Licensed Practical Nurse      Signature:  Caro Greenwood LPN

## 2022-04-28 NOTE — BH NOTE
[facility]  Discharge Note    Pt discharged with followings belongings:   Dental Appliances: Uppers,Lowers  Vision - Corrective Lenses: None  Hearing Aid: None  Jewelry: None  Body Piercings Removed: No  Clothing: Footwear,Jacket/Coat,Pants,Shirt,Undergarments,Socks  Other Valuables: Wallet (multiple cards, license, insurance card , medical marijuana card)   Valuables returned to patient. Patient education on aftercare instructions: yes, per Abebe Canada RN  Information faxed to [facility] [place] attn Dr. Karla Edward [phone] by Alecia Doe RN  at 11:27 AM .Patient verbalize understanding of AVS:  yes. Status EXAM upon discharge:  Mental Status and Behavioral Exam  Normal: Yes  Level of Assistance: Independent/Self  Facial Expression: Brightened  Affect: Appropriate,Congruent  Level of Consciousness: Alert  Frequency of Checks: 4 times per hour, close  Mood:Normal: Yes  Mood:  (denies SI/HI/AVH)  Motor Activity:Normal: Yes  Motor Activity: Increased  Eye Contact: Good  Observed Behavior: Cooperative,Friendly  Sexual Misconduct History: Current - no  Preception: [place] to person,[place] to time,[place] to place,[place] to situation  Attention:Normal: Yes  Attention: Distractible  Thought Processes:  (linear)  Thought Content:Normal: Yes  Thought Content: Preoccupations  Depression Symptoms: No problems reported or observed. Anxiety Symptoms: No problems reported or observed. Kayli Symptoms: No problems reported or observed.   Retired, Read Only Hallucination Type:  (denies)  Hallucinations: None  Delusions: No  Memory:Normal: Yes  Memory: Poor recent  Insight and Judgment: No  Insight and Judgment: Poor insight      Metabolic Screening:    Lab Results   Component Value Date    LABA1C 4.9 04/25/2022       Lab Results   Component Value Date    CHOL 216 (H) 04/25/2022    CHOL 176 03/03/2022    CHOL 170 01/09/2022     Lab Results   Component Value Date    TRIG 139 04/25/2022    TRIG 114 03/03/2022    TRIG 72 01/09/2022     Lab Results   Component Value Date    HDL 56 04/25/2022    HDL 64 (H) 03/03/2022    HDL 63 (H) 01/09/2022     No components found for: [facility]  Lab Results   Component Value Date    LABVLDL 28 04/25/2022    LABVLDL 23 03/03/2022    LABVLDL 14 01/09/2022       Bridge Appointment completed: Reviewed Discharge Instructions with patient. Patient verbalizes understanding and agreement with the discharge plan using the teachback method.      Referral for Outpatient Tobacco Cessation Counseling, upon discharge (benedicto X if applicable and completed):    ( )  Hospital staff assisted patient to call Quit Line or faxed referral                                   during hospitalization                  ( )  Recognizing danger situations (included triggers and roadblocks), if not completed on admission                    ( )  Coping skills (new ways to manage stress, exercise, relaxation techniques, changing routine, distraction), if not completed on admission                                                           ( )  Basic information about quitting (benefits of quitting, techniques in how to quit, available resources, if not completed on admission  ( ) Referral for counseling faxed to [facility]   (x ) Patient refused referral  (x ) Patient refused counseling  (x ) Patient refused smoking cessation medication upon discharge    Vaccinations (benedicto X if applicable and completed):  ( ) Patient states already received influenza vaccine elsewhere  ( ) Patient received influenza vaccine during this hospitalization  (x ) Patient refused influenza vaccine at this time

## 2022-04-28 NOTE — PROGRESS NOTES
[facility]  Discharge Note    Pt discharged with followings belongings:   Dental Appliances: Uppers,Lowers  Vision - Corrective Lenses: None  Hearing Aid: None  Jewelry: None  Body Piercings Removed: No  Clothing: Footwear,Jacket/Coat,Pants,Shirt,Undergarments,Socks  Other Valuables: Wallet (multiple cards, license, insurance card , medical marijuana card)   Valuables sent home or returned to patient. Patient education on aftercare instructions: yes  Information faxed to [facility] by JAY Naik RN  at 11:14 AM .Patient verbalize understanding of AVS:  yes. Status EXAM upon discharge:  Mental Status and Behavioral Exam  Normal: Yes  Level of Assistance: Independent/Self  Facial Expression: Brightened  Affect: Appropriate,Congruent  Level of Consciousness: Alert  Frequency of Checks: 4 times per hour, close  Mood:Normal: Yes  Mood:  (denies SI/HI/AVH)  Motor Activity:Normal: Yes  Motor Activity: Increased  Eye Contact: Good  Observed Behavior: Cooperative,Friendly  Sexual Misconduct History: Current - no  Preception: [place] to person,[place] to time,[place] to place,[place] to situation  Attention:Normal: Yes  Attention: Distractible  Thought Processes:  (linear)  Thought Content:Normal: Yes  Thought Content: Preoccupations  Depression Symptoms: No problems reported or observed. Anxiety Symptoms: No problems reported or observed. Kayli Symptoms: No problems reported or observed.   Retired, Read Only Hallucination Type:  (denies)  Hallucinations: None  Delusions: No  Memory:Normal: Yes  Memory: Poor recent  Insight and Judgment: No  Insight and Judgment: Poor insight      Metabolic Screening:    Lab Results   Component Value Date    LABA1C 4.9 04/25/2022       Lab Results   Component Value Date    CHOL 216 (H) 04/25/2022    CHOL 176 03/03/2022    CHOL 170 01/09/2022     Lab Results   Component Value Date    TRIG 139 04/25/2022    TRIG 114 03/03/2022    TRIG 72 01/09/2022     Lab Results   Component Value Date    HDL 56 04/25/2022    HDL 64 (H) 03/03/2022    HDL 63 (H) 01/09/2022     No components found for: [facility] [place]  Lab Results   Component Value Date    LABVLDL 28 04/25/2022    LABVLDL 23 03/03/2022    LABVLDL 14 01/09/2022       Brodie Oconnell RN    Bridge Appointment completed: Reviewed Discharge Instructions with patient. Patient verbalizes understanding and agreement with the discharge plan using the teachback method.      Referral for Outpatient Tobacco Cessation Counseling, upon discharge (benedicto X if applicable and completed):    ( )  Hospital staff assisted patient to call Quit Line or faxed referral                                   during hospitalization                  ( )  Recognizing danger situations (included triggers and roadblocks), if not completed on admission                    ( )  Coping skills (new ways to manage stress, exercise, relaxation techniques, changing routine, distraction), if not completed on admission                                                           ( )  Basic information about quitting (benefits of quitting, techniques in how to quit, available resources, if not completed on admission  ( ) Referral for counseling faxed to [facility]   ( ) Patient refused referral  (x ) Patient refused counseling  ( ) Patient refused smoking cessation medication upon discharge    Vaccinations (benedicto X if applicable and completed):  ( ) Patient states already received influenza vaccine elsewhere  ( ) Patient received influenza vaccine during this hospitalization  ( ) Patient refused influenza vaccine at this time

## 2022-04-28 NOTE — PLAN OF CARE
Problem: Anxiety  Goal: Will report anxiety at manageable levels  Description: INTERVENTIONS:  1. Administer medication as ordered  2. Teach and rehearse alternative coping skills  3. Provide emotional support with 1:1 interaction with staff  Outcome: Progressing     Problem: Depression/Self Harm  Goal: Effect of psychiatric condition will be minimized and patient will be protected from self harm  Description: INTERVENTIONS:  1. Assess impact of patient's symptoms on level of functioning, self care needs and offer support as indicated  2. Assess patient/family knowledge of depression, impact on illness and need for teaching  3. Provide emotional support, presence and reassurance  4. Assess for possible suicidal thoughts or ideation. If patient expresses suicidal thoughts or statements do not leave alone, initiate Suicide Precautions, move to a room close to the nursing station and obtain sitter  5. Initiate consults as appropriate with Mental Health Professional, Spiritual Care, Psychosocial CNS, and consider a recommendation to the LIP for a Psychiatric Consultation  Outcome: Progressing     Pt up ad jenn. Sociable with peers. Pleasant and appropriate with staff. Pt had no issues to note this shift. States he's ready for discharge. Monitored for safety and comfort.

## 2022-04-29 ENCOUNTER — FOLLOWUP TELEPHONE ENCOUNTER (OUTPATIENT)
Dept: PSYCHIATRY | Age: 32
End: 2022-04-29

## 2022-08-02 ENCOUNTER — HOSPITAL ENCOUNTER (INPATIENT)
Age: 32
LOS: 1 days | Discharge: HOME | DRG: 750 | End: 2022-08-03
Attending: STUDENT IN AN ORGANIZED HEALTH CARE EDUCATION/TRAINING PROGRAM | Admitting: PSYCHIATRY & NEUROLOGY
Payer: MEDICARE

## 2022-08-02 DIAGNOSIS — R45.851 SUICIDAL IDEATION: Primary | ICD-10-CM

## 2022-08-02 PROBLEM — F25.9 SCHIZOAFFECTIVE DISORDER (HCC): Status: ACTIVE | Noted: 2022-08-02

## 2022-08-02 LAB
A/G RATIO: 1.8 (ref 1.1–2.2)
ACETAMINOPHEN LEVEL: <5 UG/ML (ref 10–30)
ALBUMIN SERPL-MCNC: 4.2 G/DL (ref 3.4–5)
ALP BLD-CCNC: 62 U/L (ref 40–129)
ALT SERPL-CCNC: 14 U/L (ref 10–40)
AMPHETAMINE SCREEN, URINE: ABNORMAL
ANION GAP SERPL CALCULATED.3IONS-SCNC: 10 MMOL/L (ref 3–16)
AST SERPL-CCNC: 18 U/L (ref 15–37)
BARBITURATE SCREEN URINE: ABNORMAL
BASOPHILS ABSOLUTE: 0.1 K/UL (ref 0–0.2)
BASOPHILS RELATIVE PERCENT: 0.7 %
BENZODIAZEPINE SCREEN, URINE: ABNORMAL
BILIRUB SERPL-MCNC: 0.6 MG/DL (ref 0–1)
BILIRUBIN URINE: NEGATIVE
BLOOD, URINE: NEGATIVE
BUN BLDV-MCNC: 9 MG/DL (ref 7–20)
CALCIUM SERPL-MCNC: 9.1 MG/DL (ref 8.3–10.6)
CANNABINOID SCREEN URINE: POSITIVE
CHLORIDE BLD-SCNC: 101 MMOL/L (ref 99–110)
CLARITY: CLEAR
CO2: 25 MMOL/L (ref 21–32)
COCAINE METABOLITE SCREEN URINE: ABNORMAL
COLOR: YELLOW
CREAT SERPL-MCNC: 0.8 MG/DL (ref 0.9–1.3)
EOSINOPHILS ABSOLUTE: 0.5 K/UL (ref 0–0.6)
EOSINOPHILS RELATIVE PERCENT: 4.7 %
ETHANOL: NORMAL MG/DL (ref 0–0.08)
GFR AFRICAN AMERICAN: >60
GFR NON-AFRICAN AMERICAN: >60
GLUCOSE BLD-MCNC: 109 MG/DL (ref 70–99)
GLUCOSE URINE: NEGATIVE MG/DL
HCT VFR BLD CALC: 39 % (ref 40.5–52.5)
HEMOGLOBIN: 13.3 G/DL (ref 13.5–17.5)
INFLUENZA A: NOT DETECTED
INFLUENZA B: NOT DETECTED
KETONES, URINE: ABNORMAL MG/DL
LEUKOCYTE ESTERASE, URINE: NEGATIVE
LYMPHOCYTES ABSOLUTE: 2.1 K/UL (ref 1–5.1)
LYMPHOCYTES RELATIVE PERCENT: 20.7 %
Lab: ABNORMAL
MCH RBC QN AUTO: 30.1 PG (ref 26–34)
MCHC RBC AUTO-ENTMCNC: 34.1 G/DL (ref 31–36)
MCV RBC AUTO: 88.3 FL (ref 80–100)
METHADONE SCREEN, URINE: ABNORMAL
MICROSCOPIC EXAMINATION: ABNORMAL
MONOCYTES ABSOLUTE: 1 K/UL (ref 0–1.3)
MONOCYTES RELATIVE PERCENT: 9.7 %
NEUTROPHILS ABSOLUTE: 6.4 K/UL (ref 1.7–7.7)
NEUTROPHILS RELATIVE PERCENT: 64.2 %
NITRITE, URINE: NEGATIVE
OPIATE SCREEN URINE: ABNORMAL
OXYCODONE URINE: ABNORMAL
PDW BLD-RTO: 12.4 % (ref 12.4–15.4)
PH UA: 6
PH UA: 6 (ref 5–8)
PHENCYCLIDINE SCREEN URINE: ABNORMAL
PLATELET # BLD: 238 K/UL (ref 135–450)
PMV BLD AUTO: 6.8 FL (ref 5–10.5)
POTASSIUM REFLEX MAGNESIUM: 4.1 MMOL/L (ref 3.5–5.1)
PROPOXYPHENE SCREEN: ABNORMAL
PROTEIN UA: NEGATIVE MG/DL
RBC # BLD: 4.41 M/UL (ref 4.2–5.9)
SALICYLATE, SERUM: <0.3 MG/DL (ref 15–30)
SARS-COV-2 RNA, RT PCR: NOT DETECTED
SODIUM BLD-SCNC: 136 MMOL/L (ref 136–145)
SPECIFIC GRAVITY UA: >=1.03 (ref 1–1.03)
TOTAL PROTEIN: 6.6 G/DL (ref 6.4–8.2)
URINE TYPE: ABNORMAL
UROBILINOGEN, URINE: 0.2 E.U./DL
WBC # BLD: 10 K/UL (ref 4–11)

## 2022-08-02 PROCEDURE — 81003 URINALYSIS AUTO W/O SCOPE: CPT

## 2022-08-02 PROCEDURE — 80053 COMPREHEN METABOLIC PANEL: CPT

## 2022-08-02 PROCEDURE — G0378 HOSPITAL OBSERVATION PER HR: HCPCS

## 2022-08-02 PROCEDURE — 6370000000 HC RX 637 (ALT 250 FOR IP): Performed by: PSYCHIATRY & NEUROLOGY

## 2022-08-02 PROCEDURE — 80143 DRUG ASSAY ACETAMINOPHEN: CPT

## 2022-08-02 PROCEDURE — 82077 ASSAY SPEC XCP UR&BREATH IA: CPT

## 2022-08-02 PROCEDURE — 1240000000 HC EMOTIONAL WELLNESS R&B

## 2022-08-02 PROCEDURE — 87636 SARSCOV2 & INF A&B AMP PRB: CPT

## 2022-08-02 PROCEDURE — 85025 COMPLETE CBC W/AUTO DIFF WBC: CPT

## 2022-08-02 PROCEDURE — 99285 EMERGENCY DEPT VISIT HI MDM: CPT

## 2022-08-02 PROCEDURE — 80307 DRUG TEST PRSMV CHEM ANLYZR: CPT

## 2022-08-02 PROCEDURE — 36415 COLL VENOUS BLD VENIPUNCTURE: CPT

## 2022-08-02 PROCEDURE — 80179 DRUG ASSAY SALICYLATE: CPT

## 2022-08-02 RX ORDER — DIAZEPAM 5 MG/1
5 TABLET ORAL 3 TIMES DAILY PRN
Status: DISCONTINUED | OUTPATIENT
Start: 2022-08-02 | End: 2022-08-03 | Stop reason: HOSPADM

## 2022-08-02 RX ORDER — LORAZEPAM 0.5 MG/1
0.5 TABLET ORAL DAILY
COMMUNITY

## 2022-08-02 RX ORDER — POLYETHYLENE GLYCOL 3350 17 G
2 POWDER IN PACKET (EA) ORAL
Status: DISCONTINUED | OUTPATIENT
Start: 2022-08-02 | End: 2022-08-02

## 2022-08-02 RX ORDER — POLYETHYLENE GLYCOL 3350 17 G
2 POWDER IN PACKET (EA) ORAL
Status: DISCONTINUED | OUTPATIENT
Start: 2022-08-02 | End: 2022-08-03 | Stop reason: HOSPADM

## 2022-08-02 RX ORDER — ACETAMINOPHEN 325 MG/1
650 TABLET ORAL EVERY 6 HOURS PRN
Status: DISCONTINUED | OUTPATIENT
Start: 2022-08-02 | End: 2022-08-03 | Stop reason: HOSPADM

## 2022-08-02 RX ADMIN — NICOTINE POLACRILEX 2 MG: 2 LOZENGE ORAL at 18:39

## 2022-08-02 RX ADMIN — NICOTINE POLACRILEX 2 MG: 2 LOZENGE ORAL at 21:21

## 2022-08-02 RX ADMIN — NICOTINE POLACRILEX 2 MG: 2 LOZENGE ORAL at 16:23

## 2022-08-02 ASSESSMENT — LIFESTYLE VARIABLES
HOW MANY STANDARD DRINKS CONTAINING ALCOHOL DO YOU HAVE ON A TYPICAL DAY: PATIENT DOES NOT DRINK
HOW MANY STANDARD DRINKS CONTAINING ALCOHOL DO YOU HAVE ON A TYPICAL DAY: PATIENT DOES NOT DRINK
HOW OFTEN DO YOU HAVE A DRINK CONTAINING ALCOHOL: NEVER
HOW OFTEN DO YOU HAVE A DRINK CONTAINING ALCOHOL: NEVER

## 2022-08-02 ASSESSMENT — SLEEP AND FATIGUE QUESTIONNAIRES
SLEEP PATTERN: DIFFICULTY FALLING ASLEEP
AVERAGE NUMBER OF SLEEP HOURS: 6
DO YOU USE A SLEEP AID: NO
DO YOU HAVE DIFFICULTY SLEEPING: YES

## 2022-08-02 ASSESSMENT — PAIN - FUNCTIONAL ASSESSMENT: PAIN_FUNCTIONAL_ASSESSMENT: NONE - DENIES PAIN

## 2022-08-02 NOTE — ED NOTES
Patient able to contract for safety while here. Patient reports he does not want to harm self here.       La Cleary RN  08/02/22 [id number]

## 2022-08-02 NOTE — ED NOTES
Report called to [employer] on I. Security called to take patient to [facility].       Jeet Mckeon RN  08/02/22 9171

## 2022-08-02 NOTE — ED NOTES
Level of Care Disposition: Admit      Patient was seen by ED provider and [facility] staff. The case presented to psychiatric provider on-call Dr. Brenda Farrell. Based on the ED evaluation and information presented to the provider by this nurse it is the recommendation of the on call psychiatric provider that inpatient hospitalization is the least restrictive environment for the patient at this time. The patient will be admitted to the inpatient unit.         Michael Caldera RN  08/02/22 [id number]

## 2022-08-02 NOTE — ED NOTES
Presenting Problem: Patient presents to ED voluntarily. Patient reports increased Depression with SI. Reports he has been having thoughts of stabbing himself with a knife. Patient reports he wanted help and did not want to act on thoughts so he came here. Patient reports his depression has increased the past few days. Reports he has been compliant with his Invega Injection prescribed and received it early this month. [facility] Reports he is not prescribed any other medications. Patient denies substance abuse. Denies thoughts of harming others. Denies hallucinations and does not appear to be responding to internal stimuli and does not appear delusional. Patient appears depressed. Patient reports he has been sleeping well and eating well. Appearance/Hygiene:  well-appearing, good grooming, and good hygiene   Motor Behavior: wnl   Attitude: cooperative  Affect: depressed affect   Speech: soft  Mood: depressed   Thought Processes: [place] and Logical  Perceptions: Absent   Thought content: wnl   Orientation: A&Ox4   Memory: intact  Concentration: Good    Insight/ judgement: impaired judgment and impaired insight      Psychosocial and contextual factors: Patient reports he lives with his parents. Repots he is on SSI. Reports he has no new stressors. Patient reports he has been depressed. Reports he wants help and is willing to stay in the hospital.     C-SSRS flowsheet is  Complete.     Psychiatric History (including current outpatient provider and past inpatient admissions): Patient has diagnosis of Schizoaffective D/O Bipolar Type, Autistic Spectrum D/O     Access to Firearms: Denies     ASSESSMENT FOR IMMINENT FUTURE DANGER:    RISK FACTORS:    []  Age <25 or >49   [x]  Male gender   [x]  Depressed mood   [x]  Active suicidal ideation   [x]  Suicide plan   []  Suicide attempt   []  Access to lethal means   []  Prior suicide attempt   []  Active substance abuse (if yes pleases add details )   []  Highly impulsive behaviors   []  Not attending to self-care/ADLs    []  Recent significant loss   []  Chronic pain or medical illness   []  Social isolation   []  History of violence (if yes please elaborate )   []  Active psychosis   [x]  Cognitive impairment    []  No outpatient services in place   []  Medication noncompliance   []  No collateral information to support safety  [x] Self- injurious/ Self-harm behavior- past     PROTECTIVE FACTORS:  [x] Age >25 and <55  [] Female gender   [] Denies depression  [] Denies suicidal ideation  [] Does not have lethal plan   [] Does not have access to guns or weapons  [] Patient is verbally yany for safety  [x] No prior suicide attempts  [x] No active substance abuse  [x] Patient has social or family support  [] No active psychosis or cognitive dysfunction  [x] Physically healthy  [x] Has outpatient services in place  [x] Compliant with recommended medications  [] Collateral information from  supports patient safety   [] Patient is future oriented with plans to           Sandra Barnett RN  08/02/22 [id number]

## 2022-08-02 NOTE — ED NOTES
Patient brought back from triage. Patient changed into safety gown. Patient oriented to [facility]. Belongings locked into locker. Will continue to monitor patient.       Giorgi Tatum RN  08/02/22 0360
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
## 2022-08-03 VITALS
TEMPERATURE: 98.2 F | RESPIRATION RATE: 16 BRPM | HEIGHT: 72 IN | BODY MASS INDEX: 38.33 KG/M2 | OXYGEN SATURATION: 98 % | SYSTOLIC BLOOD PRESSURE: 122 MMHG | DIASTOLIC BLOOD PRESSURE: 71 MMHG | HEART RATE: 73 BPM | WEIGHT: 283 LBS

## 2022-08-03 PROCEDURE — 99222 1ST HOSP IP/OBS MODERATE 55: CPT

## 2022-08-03 PROCEDURE — G0378 HOSPITAL OBSERVATION PER HR: HCPCS

## 2022-08-03 PROCEDURE — 5130000000 HC BRIDGE APPOINTMENT

## 2022-08-03 PROCEDURE — 6370000000 HC RX 637 (ALT 250 FOR IP): Performed by: PSYCHIATRY & NEUROLOGY

## 2022-08-03 PROCEDURE — 6370000000 HC RX 637 (ALT 250 FOR IP)

## 2022-08-03 RX ORDER — NICOTINE 21 MG/24HR
1 PATCH, TRANSDERMAL 24 HOURS TRANSDERMAL DAILY
Status: DISCONTINUED | OUTPATIENT
Start: 2022-08-03 | End: 2022-08-03 | Stop reason: HOSPADM

## 2022-08-03 RX ADMIN — NICOTINE POLACRILEX 2 MG: 2 LOZENGE ORAL at 11:41

## 2022-08-03 RX ADMIN — NICOTINE POLACRILEX 2 MG: 2 LOZENGE ORAL at 08:13

## 2022-08-03 RX ADMIN — DIAZEPAM 5 MG: 5 TABLET ORAL at 07:38

## 2022-08-03 RX ADMIN — NICOTINE POLACRILEX 2 MG: 2 LOZENGE ORAL at 14:11

## 2022-08-03 RX ADMIN — NICOTINE POLACRILEX 2 MG: 2 LOZENGE ORAL at 06:04

## 2022-08-03 ASSESSMENT — SLEEP AND FATIGUE QUESTIONNAIRES
AVERAGE NUMBER OF SLEEP HOURS: 6
DO YOU HAVE DIFFICULTY SLEEPING: YES
DO YOU USE A SLEEP AID: NO
SLEEP PATTERN: DIFFICULTY FALLING ASLEEP

## 2022-08-03 NOTE — DISCHARGE SUMMARY
Discharge Summary     Admit Date: 8/2/2022   Discharge Date:    8/3/2022    Spent over 40 minutes with patient and staff on [facility] more than 50 % of that time was spent counseling the patient on their symptoms, treatment, and expected goals. Final Dx:   Schizoaffective disorder ([address]       At Discharge: 61-70 mild symptoms   All conditions and active problems were treated while patient was hospitalized. Condition on DC  Mood and affect are stable and pt is not suicidal     VITALS:  /71   Pulse 73   Temp 98.2 °F (36.8 °C) (Temporal)   Resp 16   Ht 6' (1.829 m)   Wt 283 lb (128.4 kg)   SpO2 98%   BMI 38.38 kg/m²     Brief Summary Present Illness   Patient seen in room on Adult Behavioral Unit. Patient is a 33 y.o. male who presents  to ED voluntarily. Pr RUSS notes:  \"Patient reports increased Depression with SI. Reports he has been having thoughts of stabbing himself with a knife. Patient reports he wanted help and did not want to act on thoughts so he came here. Patient reports his depression has increased the past few days. Reports he has been compliant with his Invega Injection prescribed and received it early this month. Reports [facility] Reports he is not prescribed any other medications. Patient denies substance abuse. Denies thoughts of harming others. Denies hallucinations and does not appear to be responding to internal stimuli and does not appear delusional. Patient appears depressed. Patient reports he has been sleeping well and eating well. \"     Pt now on BHI, tells myself and nursing that he is not suicidal, only told his parents this because he wanted attention. Pt states that he said those things last night, but did not mean them and has no intention of hurting himself. Pt reports taking his medications as prescribed, sleeping and eating well.   Pt states he wants to return home and will call [facility] today to make an appointment for follow up. Pt also spoke with his parents and they are ok with him returning home as well. Pt denies all SI/HI an AVH, safe to discharge home at this time. Pt will be observation for this stay. Hospital Course Patient stabilized on meds and milieu treatment. Patient was discharged to home to continue recovery in the community.      PE: (reviewed) and labs (see medical H&PE)  Labs:    Admission on 08/02/2022   Component Date Value Ref Range Status    WBC 08/02/2022 10.0  4.0 - 11.0 K/uL Final    RBC 08/02/2022 4.41  4.20 - 5.90 M/uL Final    Hemoglobin 08/02/2022 13.3 (A) 13.5 - 17.5 g/dL Final    Hematocrit 08/02/2022 39.0 (A) 40.5 - 52.5 % Final    MCV 08/02/2022 88.3  80.0 - 100.0 fL Final    MCH 08/02/2022 30.1  26.0 - 34.0 pg Final    MCHC 08/02/2022 34.1  31.0 - 36.0 g/dL Final    RDW 08/02/2022 12.4  12.4 - 15.4 % Final    Platelets 42/68/5991 238  135 - 450 K/uL Final    MPV 08/02/2022 6.8  5.0 - 10.5 fL Final    Neutrophils % 08/02/2022 64.2  % Final    Lymphocytes % 08/02/2022 20.7  % Final    Monocytes % 08/02/2022 9.7  % Final    Eosinophils % 08/02/2022 4.7  % Final    Basophils % 08/02/2022 0.7  % Final    Neutrophils Absolute 08/02/2022 6.4  1.7 - 7.7 K/uL Final    Lymphocytes Absolute 08/02/2022 2.1  1.0 - 5.1 K/uL Final    Monocytes Absolute 08/02/2022 1.0  0.0 - 1.3 K/uL Final    Eosinophils Absolute 08/02/2022 0.5  0.0 - 0.6 K/uL Final    Basophils Absolute 08/02/2022 0.1  0.0 - 0.2 K/uL Final    Sodium 08/02/2022 136  136 - 145 mmol/L Final    Potassium reflex Magnesium 08/02/2022 4.1  3.5 - 5.1 mmol/L Final    Chloride 08/02/2022 101  99 - 110 mmol/L Final    CO2 08/02/2022 25  21 - 32 mmol/L Final    Anion Gap 08/02/2022 10  3 - 16 Final    Glucose 08/02/2022 109 (A) 70 - 99 mg/dL Final    BUN 08/02/2022 9  7 - 20 mg/dL Final    Creatinine 08/02/2022 0.8 (A) 0.9 - 1.3 mg/dL Final    GFR Non- 08/02/2022 >60  >60 Final    Comment: >60 mL/min/1.73m2 EGFR, adulteration. Another sample should be collected if clinically  indicated. Drug Screen Comment: 08/02/2022 see below   Final    Comment: This method is a screening test to detect only these drug  classes as part of a medical workup. Confirmatory testing  by another method should be ordered if clinically indicated. Color, UA 08/02/2022 Yellow  Straw/Yellow Final    Clarity, UA 08/02/2022 Clear  Clear Final    Glucose, Ur 08/02/2022 Negative  Negative mg/dL Final    Bilirubin Urine 08/02/2022 Negative  Negative Final    Ketones, Urine 08/02/2022 TRACE (A) Negative mg/dL Final    Specific Gravity, UA 08/02/2022 >=1.030  1.005 - 1.030 Final    Blood, Urine 08/02/2022 Negative  Negative Final    pH, UA 08/02/2022 6.0  5.0 - 8.0 Final    Protein, UA 08/02/2022 Negative  Negative mg/dL Final    Urobilinogen, Urine 08/02/2022 0.2  <2.0 E.U./dL Final    Nitrite, Urine 08/02/2022 Negative  Negative Final    Leukocyte Esterase, Urine 08/02/2022 Negative  Negative Final    Microscopic Examination 08/02/2022 Not Indicated   Final    Urine Type 08/02/2022 NotGiven   Final    Urine received in a container without preservatives. Acetaminophen Level 08/02/2022 <5 (A) 10 - 30 ug/mL Final    Comment: Therapeutic Range: 10.0-30.0 ug/mL  Toxic: >=637 ug/mL      Salicylate, Serum 04/30/8440 <0.3 (A) 15.0 - 30.0 mg/dL Final    Comment: Therapeutic Range: 15.0-30.0 mg/dL  Toxic: >30.0 mg/dL      SARS-CoV-2 RNA, RT PCR 08/02/2022 NOT DETECTED  NOT DETECTED Final    Comment: Not Detected results do not preclude SARS-CoV-2 infection and  should not be used as the sole basis for patient management  decisions. Results must be combined with clinical observations,  patient history, and epidemiological information. Testing was performed using KRYSTIAN NANCY SARS-CoV-2 and Influenza A/B  nucleic acid assay.  This test is a multiplex Real-Time Reverse  Transcriptase Polymerase Chain Reaction (RT-PCR)-based in vitro  diagnostic test intended for the qualitative detection of nucleic  acids from SARS-CoV-2, influenza A, and influenza B in nasopharyngeal  and nasal swab specimens for use under the FDAs Emergency Use  Authorization (EUA) only. Patient Fact Sheet:  FindDrives.pl  Provider Fact Sheet: FindDrives.pl  EUA: FindDrives.pl  IFU: FindDrives.pl    Methodology:  RT-PCR      INFLUENZA A 08/02/2022 NOT DETECTED  NOT DETECTED Final    INFLUENZA B 08/02/2022 NOT DETECTED  NOT DETECTED Final          Mental Status Exam at Discharge:  Level of consciousness:  awake  Appearance:  well-appearing, in chair, good grooming and good hygiene well-developed, well-nourished  Behavior/Motor:  no abnormalities noted normal gait and station AIMS: 0  Attitude toward examiner:  cooperative, attentive and good eye contact  Speech:  spontaneous, normal rate, normal volume and well articulated  Mood:  dysthymic  Affect:  mood congruent Anxiety: mild  Hallucinations: Absent  Thought processes:  coherent Attention span, Concentration & Attention:  attention span and concentration were age appropriate  Thought content:  No evidence of delusions OCD: none    Insight: normal insight and judgment Cognition:  oriented to person, place, and time  Fund of Knowledge: average  IQ:average Memory: intact  Suicide:  No specific plan to harm self  Sleep: sleeps through the night  Appetite: ok     Reassess Suicide Risk:  no specific plan to harm self Pt has phone numbers to contact if suicidal thoughts recur and states pt will return to the hospital if suicidal feelings return. Hospital Routine Meds:     nicotine  1 patch TransDERmal Daily      Hospital PRN Meds: nicotine polacrilex, acetaminophen, diazePAM   Discharge Meds:    Current Discharge Medication List             Details   LORazepam (ATIVAN) 0.5 MG tablet Take 0.5 mg by mouth in the morning. paliperidone palmitate ER (INVEGA SUSTENNA) 234 MG/1.5ML MADDI IM injection Inject 234 mg into the muscle once for 1 dose  Qty: 1 each, Refills: 0                 Disposition - Residence Home or Self Care       Follow Up:  See Discharge Instructions     Total time with patient was 40 minutes and more than 50 % of that time was spent counseling the patient on their symptoms, treatment and expected goals.      Oswaldo Rich - [facility]
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
## 2022-08-03 NOTE — FLOWSHEET NOTE
provided emotional and spiritual support for patient, who shared that he is not a threat to himself and wants to go home. Patient states that he is well supported by his parents and siblings, and that his saul helps him. Patient requested prayer that he will be able to return home, which writer provided. 08/03/22 1136   Encounter Summary   Encounter Overview/Reason  Initial Encounter;Spiritual/Emotional Needs   Service Provided For: Patient   Referral/Consult From: Nurse   Support System Parent; Family members   Last Encounter  08/03/22  (Prayed w/ pt)   Complexity of Encounter Low   Begin Time 1100   End Time  1110   Total Time Calculated 10 min   Encounter    Type Initial Screen/Assessment   Assessment/Intervention/Outcome   Assessment Coping   Intervention Active listening;Discussed belief system/[facility] practices/saul; Explored/Affirmed feelings, thoughts, concerns;Explored Coping Skills/Resources;Prayer (assurance of)/[place]   Outcome Coping;Engaged in conversation;Expressed feelings, needs, and concerns;Expressed Gratitude

## 2022-08-03 NOTE — DISCHARGE INSTRUCTIONS
Advanced Directives:  Patient does not have a surrogate decision maker appointed   Name (if yes): declined Phone Number:   Patient does not have a psychiatric and/ or medical advanced directive or power of . Patient was offered psychiatric and/ or medical advanced directive or power of  information/completion but declined to complete   Why not? declined    Discharge Planning is Complete. Discharge Date: 8/3/2022  Reason for Hospitalization: Suicidal Ideation  Discharge Diagnosis: Bipolar Disorder  Discharging to: Private Domicile    Your instructions: Your physician here was Colten Espinoza MD. If you have any questions please call the unit at [phone] for the adult unit or [phone] for [facility]. Please note that we have a patient family advisory [place] that meets the second Wednesday of January and the second Wednesday of July at [address] in [place] at [facility]. Department leadership would love for you to attend to give feedback on what we are doing well and areas in which we can improve our patient care. Please come if you are able and feel free to reach out to [facility] at [phone] with any questions. The crisis number for [facility] is [phone] (SAVE). This crisis line is available 24 hours a day, seven days a week. The National Crisis Number can be accessed by calling or texting 737 24 hours a day, 7 days a week. Please follow up with your PCP regarding any pending labs. You are connected to behavioral health services at [facility]. Follow up with them for behavioral health treatment. Follow up with your provider on Friday, August 5th, 2022. Name of Provider: [facility]  Provider specialty/license: mental health services  Date and time of appointment: Follow up Friday, August 5th, 2022.   The type/s of services requested are: counseling/medication management  Agency name: [facility]  Address: [address] [place] [address] Dimitri Butterfield, [address]  Phone: [phone]  Special instructions (what to bring to appointment, etc.):       Discharge Completed By: Mookie GRANT  Fax to: Follow up provider name and number  [facility] [phone]    The following personal items were collected during your admission and were returned to you:    Belongings  Dental Appliances: Uppers, Lowers  Vision - Corrective Lenses: None  Hearing Aid: None  Clothing: Footwear, Pants, Shirt, Shorts, Undergarments  Jewelry: None  Body Piercings Removed: N/A  Electronic Devices: Cell Phone  Weapons (Notify Protective Services/Security): None  Other Valuables: Lighter/Matches, Other (Comment) (vape)  Home Medications: None  Valuables Given To: Patient, [employer], Other (Comment) (phone and vape in safe)  Provide Name(s) of Who Valuable(s) Were Given To: patient, locker, safe    By signing below, I understand and acknowledge receipt of the instructions indicated above.

## 2022-08-03 NOTE — PROGRESS NOTES
Pt came to [facility], anxious, asking for nicotine lozenge. Advised too early for next lozenge and asked about anxiety. Pt wants to go home and states he is very anxious. Offered PRN valium and he accepted.

## 2022-08-03 NOTE — BH NOTE
[facility]  Discharge Note    Pt discharged with followings belongings:   Dental Appliances: Uppers, Lowers  Vision - Corrective Lenses: None  Hearing Aid: None  Jewelry: None  Body Piercings Removed: N/A  Clothing: Footwear, Pants, Shirt, Shorts, Undergarments  Other Valuables: Lighter/Matches, Other (Comment) (vape)   Valuables returned to patient. Patient education on aftercare instructions: yes  Information faxed to [facility] by [facility] RN  at 3:00 PM .Patient verbalize understanding of AVS:  yes. Status EXAM upon discharge:  Mental Status and Behavioral Exam  Normal: Yes  Level of Assistance: Independent/Self  Facial Expression: Flat  Affect: Appropriate  Level of Consciousness: Alert  Frequency of Checks: 4 times per hour, close  Mood:Normal: Yes  Motor Activity:Normal: Yes  Eye Contact: Good  Observed Behavior: Cooperative, Friendly  Sexual Misconduct History: Current - no  Preception: [place] to person, [place] to time, [place] to place, [place] to situation  Attention:Normal: Yes  Thought Processes: Circumstantial  Thought Content:Normal: No  Thought Content: Other (comment) (linear)  Depression Symptoms: No problems reported or observed. Anxiety Symptoms: No problems reported or observed. Kayli Symptoms: No problems reported or observed.   Hallucinations: None  Delusions: No  Memory:Normal: Yes  Insight and Judgment: No  Insight and Judgment: Poor judgment, Poor insight    Tobacco Screening:  Practical Counseling, on admission, benedicto X, if applicable and completed (first 3 are required if patient doesn't refuse):            ( ) Recognizing danger situations (included triggers and roadblocks)                    ( ) Coping skills (new ways to manage stress,relaxation techniques, changing routine, distraction)                                                           ( ) Basic information about quitting (benefits of quitting, techniques in how to quit, available resources  ( ) Referral for counseling faxed to [facility]                                                                                                                   ( ) Patient refused counseling  ( ) Patient refused referral  ( ) Patient refused prescription upon discharge  (x ) Patient has not smoked in the last 30 days    Metabolic Screening:    Lab Results   Component Value Date    LABA1C 4.9 04/25/2022       Lab Results   Component Value Date    CHOL 216 (H) 04/25/2022    CHOL 176 03/03/2022    CHOL 170 01/09/2022     Lab Results   Component Value Date    TRIG 139 04/25/2022    TRIG 114 03/03/2022    TRIG 72 01/09/2022     Lab Results   Component Value Date    HDL 56 04/25/2022    HDL 64 (H) 03/03/2022    HDL 63 (H) 01/09/2022     No components found for: [facility] [place]  Lab Results   Component Value Date    LABVLDL 28 04/25/2022    LABVLDL 23 03/03/2022    LABVLDL 14 01/09/2022       Bridge Appointment completed: Reviewed Discharge Instructions with patient. Patient verbalizes understanding and agreement with the discharge plan using the teachback method.          Vaccinations (benedicto X if applicable and completed):  ( ) Patient states already received influenza vaccine elsewhere  ( ) Patient received influenza vaccine during this hospitalization  (x ) Patient refused influenza vaccine at this time

## 2022-08-03 NOTE — FLOWSHEET NOTE
Purposeful Rounding    Patient Location: Patient room    Patient willing to engage in conversation: Yes    Presentation/behavior: Anxious    Affect: Anxious    Concerns reported: anxious concerning when he will see the provider.   Patient educated on the process, verbalized understanding    PRN medications given: n/a    Environmental assessment: Room free from clutter, Clear path to bathroom , and No safety hazards noted    Fall prevention interventions in place: Lighting appropriate, Room free of clutter, and Clear path to bathroom        Electronically signed by Dennys Ghosh RN on 8/3/22 at 12:43 PM EDT

## 2022-08-03 NOTE — PROGRESS NOTES
Patient was calm and cooperative with interview. Patient denies SI/HI/AVH. When asked what brought him to the hospital, patient stated,  \"I just wanted some attention so I said I was suicidal.\"  Patient not having thoughts of SI at this time and states he would never hurt himself.

## 2022-08-03 NOTE — CARE COORDINATION
08/03/22 0901   Psychiatric History   Psychiatric history treatment Psychiatric admissions   Are there any medication issues?  Yes   Recent Psychological Experiences   (n/a)   Support System   Support system Adequate   Types of Support System Mother;Father;Friend   Problems in support system None   Current Living Situation   Home Living Adequate   Living information Lives with others  (lives with parents)   Problems with living situation  No   Lack of basic needs No   SSDI/SSI SSI   Other government assistance [place] advantage   Problems with environment none   Current abuse issues no   Supervised setting None   Relationship problems   (going through a divorce)   Medical and Self-Care Issues   Relevant medical problems none   Relevant self-care issues none   Barriers to treatment No   Family Constellation   Spouse/partner-name/age    Children-names/ages none   Parents mom [facility] Room [phone] Nirmal Crowley   Siblings 1 living brother and 2 sisters   Support services   ([address])   Childhood   Raised by Biological mother;Biological father   Biological mother mom [facility] Room [phone]   Biological father Lopez Mickey family history pszophrenia   History of abuse No   Legal History   Legal history No   Other relevant legal issues none   Juvenile legal history No   Abuse Assessment   Physical Abuse Denies   Verbal Abuse Denies   Emotional abuse Denies   Financial Abuse Denies   Sexual abuse Denies   Possible abuse reported to None needed   Substance Use   Use of substances  Yes  (Delta-8)   Motivation for SA Treatment   Stage of engagement   (n/a)   Motivation for treatment No  (n/a)   Current barriers to treatment   (n/a)   Education   Education HS graduate -GED   Special education   (n/a)   Work History   Currently employed No   Recent job loss or change   (n/a)    service   (n/a)   /VA involvement n/a   Cultural and Spiritual   Spiritual concerns

## 2022-08-03 NOTE — PLAN OF CARE
Patient relaxed, visible for snacks. Denies SI/HI/AVH and CFS. Resting in bed after No scheduled medications. Commit lozenge given x1 Will continue to monitor.

## 2022-08-03 NOTE — BH NOTE
[facility]  Admission Note     Admission Type:   Admission Type: Voluntary    Reason for admission:  Reason for Admission: feeling depressed      Addictive Behavior:   Addictive Behavior  In the Past 3 Months, Have You Felt or Has Someone Told You That You Have a Problem With  : None    Medical Problems:   Past Medical History:   Diagnosis Date    ADHD     Adult respiratory distress syndrome ([facility] [address]     ICU     Anxiety     Bipolar disorder     Cognitive disorder     Finger fracture, left 11/22/2017    GERD (gastroesophageal reflux disease)     Hiatal hernia 01/19/2018    Hypertension     Kidney stones     MR (mental retardation), moderate     Nausea and vomiting     Pneumococcal pneumonia (HCC)     Seizures ([facility] [address]     pt denied any seizures ever       Status EXAM:  Mental Status and Behavioral Exam  Normal: Yes  Level of Assistance: Independent/Self  Facial Expression: Flat  Affect: Appropriate  Level of Consciousness: Alert  Frequency of Checks: 4 times per hour, close  Mood:Normal: Yes  Motor Activity:Normal: Yes  Eye Contact: Good  Observed Behavior: Cooperative  Sexual Misconduct History: Current - no  Preception: [place] to person, [place] to time, [place] to place  Attention:Normal: Yes  Thought Processes: Other (comment)  Thought Content:Normal: Yes  Depression Symptoms: No problems reported or observed. Anxiety Symptoms: No problems reported or observed. Kayli Symptoms: No problems reported or observed. Hallucinations: None  Delusions: No  Memory:Normal: Yes  Insight and Judgment: Yes    Tobacco Screening:  Practical Counseling, on admission, benedicto X, if applicable and completed (first 3 are required if patient doesn't refuse):             ( x) Recognizing danger situations (included triggers and roadblocks)                    ( x) Coping skills (new ways to manage stress,relaxation techniques, changing routine, distraction)                                                           ( )x Basic information about quitting (benefits of quitting, techniques in how to quit, available resources  ( ) Referral for counseling faxed to [facility]                                                                                                                   ( ) Patient refused counseling  ( ) Patient has not smoked in the last 30 days    Metabolic Screening:    Lab Results   Component Value Date    LABA1C 4.9 04/25/2022       Lab Results   Component Value Date    CHOL 216 (H) 04/25/2022    CHOL 176 03/03/2022    CHOL 170 01/09/2022     Lab Results   Component Value Date    TRIG 139 04/25/2022    TRIG 114 03/03/2022    TRIG 72 01/09/2022     Lab Results   Component Value Date    HDL 56 04/25/2022    HDL 64 (H) 03/03/2022    HDL 63 (H) 01/09/2022     No components found for: LDLCAL  Lab Results   Component Value Date    LABVLDL 28 04/25/2022    LABVLDL 23 03/03/2022    LABVLDL 14 01/09/2022         Body mass index is 38.38 kg/m². BP Readings from Last 2 Encounters:   08/02/22 127/76   04/28/22 135/77           Pt admitted with followings belongings:  Dental Appliances: Uppers, Lowers  Vision - Corrective Lenses: None  Hearing Aid: None  Jewelry: None  Body Piercings Removed: N/A  Clothing:  Footwear, Pants, Shirt, Shorts, Undergarments  Other Valuables: Lighter/Matches, Other (Comment) (vape)    Loki Baker RN

## 2022-08-03 NOTE — BH NOTE
Patient denies SI and is able to contract for safety. Dr Blaek Briceno notified, suicide precautions d/c. Per Dr Blake Briceno patient does not need a sitter.

## 2022-08-03 NOTE — PLAN OF CARE
Problem: Coping  Goal: Pt/Family able to verbalize concerns and demonstrate effective coping strategies  Description: INTERVENTIONS:  1. Assist patient/family to identify coping skills, available support systems and cultural and spiritual values  2. Provide emotional support, including active listening and acknowledgement of concerns of patient and caregivers  3. Reduce environmental stimuli, as able  4. Instruct patient/family in relaxation techniques, as appropriate  5. Assess for spiritual pain/suffering and initiate Spiritual Care, Psychosocial Clinical Specialist consults as needed  Outcome: Progressing   Patient visible on unit. He did not attend any groups. He was mostly isolated to self. He was medication compliant. He was calm and cooperative with interview. He denies SI/HI/AVH. Patient states he was never suicidal, that he wanted attention so he said he was suicidal.  He had no intentions of hurting himself.

## 2024-01-03 ENCOUNTER — TELEPHONE (OUTPATIENT)
Dept: FAMILY MEDICINE CLINIC | Age: 34
End: 2024-01-03

## 2024-01-03 NOTE — TELEPHONE ENCOUNTER
----- Message from Lucila Shah sent at 1/3/2024  2:16 PM EST -----  Subject: Message to Provider    QUESTIONS  Information for Provider? Patient is calling in to schedule and   appointment with provider Conn for physical, referral for denture plates   and night terrors. Patient has not seen provider in awhile and states   please send a message to provider and let her know he would like to   schedule. Please Advise  ---------------------------------------------------------------------------  --------------  CALL BACK INFO  [phone]; OK to leave message on voicemail  ---------------------------------------------------------------------------  --------------  SCRIPT ANSWERS  Relationship to Patient? Self

## 2024-01-03 NOTE — TELEPHONE ENCOUNTER
This is a non urgent appointment and can be scheduled at the next available 40 minute routine appointment

## 2024-02-12 ENCOUNTER — TELEPHONE (OUTPATIENT)
Dept: FAMILY MEDICINE CLINIC | Age: 34
End: 2024-02-12

## 2024-02-12 NOTE — TELEPHONE ENCOUNTER
----- Message from Shaggy Tompkins sent at 2/12/2024  9:53 AM EST -----  Subject: Appointment Request    Reason for Call: New Patient/New to Provider Appointment needed: New   Patient Request Appointment    QUESTIONS    Reason for appointment request? No appointments available during search     Additional Information for Provider? Pt Norman Garcia mother Frannie was   calling to reschedule Pt re-establish appt with Provider Jasen due to a   family emergency in another state. Pt mother would like to schedule   sometime next month. Please call, thanks.  ---------------------------------------------------------------------------  --------------  CALL BACK INFO  [phone]; OK to leave message on voicemail  ---------------------------------------------------------------------------  --------------  SCRIPT ANSWERS

## 2024-02-12 NOTE — TELEPHONE ENCOUNTER
Please call patient and reschedule his appointment to reestablish care with me when the next 40-minute nonsame-day appointment is available

## 2024-04-30 ENCOUNTER — TELEPHONE (OUTPATIENT)
Dept: FAMILY MEDICINE CLINIC | Age: 34
End: 2024-04-30

## 2024-04-30 NOTE — TELEPHONE ENCOUNTER
Pt called and he stated that he was off in rehab and that is why he missed his appointments on 2/14/24 and 3/20/24. Said that he was in [place] and he just got out, was wanting to see if he could get in to see you now that he is back.

## 2024-05-01 NOTE — TELEPHONE ENCOUNTER
Patient can be scheduled for a 1 hour new patient appointment with me when available--this must be an office and not virtual

## 2024-05-01 NOTE — TELEPHONE ENCOUNTER
Attempted to call patient.  VM not set up at this time.  Sent patient a my chart message to call office

## 2024-05-29 ENCOUNTER — OFFICE VISIT (OUTPATIENT)
Dept: FAMILY MEDICINE CLINIC | Age: 34
End: 2024-05-29

## 2024-05-29 VITALS
HEIGHT: 73 IN | OXYGEN SATURATION: 98 % | WEIGHT: 225 LBS | DIASTOLIC BLOOD PRESSURE: 76 MMHG | SYSTOLIC BLOOD PRESSURE: 128 MMHG | HEART RATE: 70 BPM | BODY MASS INDEX: 29.82 KG/M2

## 2024-05-29 DIAGNOSIS — Z13.220 SCREENING FOR CHOLESTEROL LEVEL: ICD-10-CM

## 2024-05-29 DIAGNOSIS — L98.7 EXCESS SKIN: ICD-10-CM

## 2024-05-29 DIAGNOSIS — Z13.21 ENCOUNTER FOR VITAMIN DEFICIENCY SCREENING: ICD-10-CM

## 2024-05-29 DIAGNOSIS — Z76.89 ENCOUNTER TO ESTABLISH CARE: ICD-10-CM

## 2024-05-29 DIAGNOSIS — M54.32 SCIATICA OF LEFT SIDE: ICD-10-CM

## 2024-05-29 DIAGNOSIS — D64.9 ANEMIA, UNSPECIFIED TYPE: Primary | ICD-10-CM

## 2024-05-29 DIAGNOSIS — Z72.0 VAPES NICOTINE CONTAINING SUBSTANCE: ICD-10-CM

## 2024-05-29 DIAGNOSIS — Z13.29 THYROID DISORDER SCREEN: ICD-10-CM

## 2024-05-29 DIAGNOSIS — R73.9 HYPERGLYCEMIA: ICD-10-CM

## 2024-05-29 DIAGNOSIS — F31.9 BIPOLAR 1 DISORDER (HCC): ICD-10-CM

## 2024-05-29 PROCEDURE — 99204 OFFICE O/P NEW MOD 45 MIN: CPT | Performed by: NURSE PRACTITIONER

## 2024-05-29 PROCEDURE — 96372 THER/PROPH/DIAG INJ SC/IM: CPT | Performed by: NURSE PRACTITIONER

## 2024-05-29 PROCEDURE — 36415 COLL VENOUS BLD VENIPUNCTURE: CPT | Performed by: NURSE PRACTITIONER

## 2024-05-29 RX ORDER — OLANZAPINE 10 MG/1
TABLET ORAL
COMMUNITY
End: 2024-05-29

## 2024-05-29 RX ORDER — NAPROXEN 500 MG/1
500 TABLET ORAL 2 TIMES DAILY WITH MEALS
Qty: 60 TABLET | Refills: 0 | Status: SHIPPED | OUTPATIENT
Start: 2024-05-29

## 2024-05-29 RX ORDER — LACTOBACILLUS RHAMNOSUS GG 10B CELL
1 CAPSULE ORAL 2 TIMES DAILY
COMMUNITY
Start: 2024-05-29

## 2024-05-29 RX ORDER — KETOROLAC TROMETHAMINE 30 MG/ML
60 INJECTION, SOLUTION INTRAMUSCULAR; INTRAVENOUS ONCE
Status: COMPLETED | OUTPATIENT
Start: 2024-05-29 | End: 2024-05-29

## 2024-05-29 RX ORDER — POLYETHYLENE GLYCOL 3350 17 G
2 POWDER IN PACKET (EA) ORAL PRN
Qty: 100 EACH | Refills: 3 | Status: SHIPPED | OUTPATIENT
Start: 2024-05-29

## 2024-05-29 RX ADMIN — KETOROLAC TROMETHAMINE 60 MG: 30 INJECTION, SOLUTION INTRAMUSCULAR; INTRAVENOUS at 13:56

## 2024-05-29 ASSESSMENT — PATIENT HEALTH QUESTIONNAIRE - PHQ9
3. TROUBLE FALLING OR STAYING ASLEEP: NOT AT ALL
2. FEELING DOWN, DEPRESSED OR HOPELESS: NOT AT ALL
SUM OF ALL RESPONSES TO PHQ QUESTIONS 1-9: 0
SUM OF ALL RESPONSES TO PHQ QUESTIONS 1-9: 0
8. MOVING OR SPEAKING SO SLOWLY THAT OTHER PEOPLE COULD HAVE NOTICED. OR THE OPPOSITE, BEING SO FIGETY OR RESTLESS THAT YOU HAVE BEEN MOVING AROUND A LOT MORE THAN USUAL: NOT AT ALL
4. FEELING TIRED OR HAVING LITTLE ENERGY: NOT AT ALL
6. FEELING BAD ABOUT YOURSELF - OR THAT YOU ARE A FAILURE OR HAVE LET YOURSELF OR YOUR FAMILY DOWN: NOT AT ALL
9. THOUGHTS THAT YOU WOULD BE BETTER OFF DEAD, OR OF HURTING YOURSELF: NOT AT ALL
SUM OF ALL RESPONSES TO PHQ QUESTIONS 1-9: 0
1. LITTLE INTEREST OR PLEASURE IN DOING THINGS: NOT AT ALL
5. POOR APPETITE OR OVEREATING: NOT AT ALL
SUM OF ALL RESPONSES TO PHQ QUESTIONS 1-9: 0
SUM OF ALL RESPONSES TO PHQ9 QUESTIONS 1 & 2: 0
7. TROUBLE CONCENTRATING ON THINGS, SUCH AS READING THE NEWSPAPER OR WATCHING TELEVISION: NOT AT ALL
10. IF YOU CHECKED OFF ANY PROBLEMS, HOW DIFFICULT HAVE THESE PROBLEMS MADE IT FOR YOU TO DO YOUR WORK, TAKE CARE OF THINGS AT HOME, OR GET ALONG WITH OTHER PEOPLE: NOT DIFFICULT AT ALL

## 2024-05-29 ASSESSMENT — ENCOUNTER SYMPTOMS
RESPIRATORY NEGATIVE: 1
SHORTNESS OF BREATH: 0
ALLERGIC/IMMUNOLOGIC NEGATIVE: 1
BLOOD IN STOOL: 0
ABDOMINAL PAIN: 0
NAUSEA: 0
ANAL BLEEDING: 0
BACK PAIN: 1
GASTROINTESTINAL NEGATIVE: 1
EYES NEGATIVE: 1

## 2024-05-29 NOTE — PROGRESS NOTES
[facility]  [facility]  [address] [place] [id number]  Dept: [phone]  Dept Fax: [phone]  Loc: [phone]    Norman Garcia is a 34 y.o. male who presents today for his medical conditions/complaints as noted below.  Norman Garcia is c/o of Other (Re-establish care)       Subjective:     Chief Complaint   Patient presents with    Other     Re-establish care       HPI  This is a 34-year-old male patient that is here to reestablish care.  He is well-known to me but I have not seen him in approximately 5 years.  It appears his last PCP was at [facility] and was last seen March 2022  Patient has a longstanding history of substance abuse (drug and alcohol), mental health disorders as well as autistic spectrum disorder.  The patient states that recently he was out in [place] for approximately 3 months--he states that it was because his sister \"lied about him binge drinking\".  Patient states this ended up in him having a psychiatric hold while he was in [place]  Patient states that he was prescribed Zyprexa by the psychiatrist in [place] however he is only doing 5 mg 3 times daily as needed.   He states that he was put on prazosin for night terrors but according to him he had a bradycardia while he was out in [place] and so they took him off of that medication   He states he was put on Geodon for approximately 2 weeks but quit taking it about 2 months ago  He states that he has not done any type of \"ice\" or meth/cocaine since he was about 29 years old.  He states that in regards to his binge drinking of alcohol he states he has only had 1 episode in the last 2 years and that was February 13, 2024.  He states he had a a lot of loss in his life with pets, and divorce and some family issues and this caused him to binge drink  The patient states he just started going to the [facility] for counseling and Narcotics Anonymous.  He has not seen a

## 2024-05-30 ENCOUNTER — TELEPHONE (OUTPATIENT)
Dept: ADMINISTRATIVE | Age: 34
End: 2024-05-30

## 2024-05-30 LAB
25(OH)D3 SERPL-MCNC: 31.8 NG/ML
ALBUMIN SERPL-MCNC: 4.4 G/DL (ref 3.4–5)
ALBUMIN/GLOB SERPL: 1.6 {RATIO} (ref 1.1–2.2)
ALP SERPL-CCNC: 61 U/L (ref 40–129)
ALT SERPL-CCNC: 16 U/L (ref 10–40)
ANION GAP SERPL CALCULATED.3IONS-SCNC: 12 MMOL/L (ref 3–16)
AST SERPL-CCNC: 15 U/L (ref 15–37)
BASOPHILS # BLD: 0.1 K/UL (ref 0–0.2)
BASOPHILS NFR BLD: 1 %
BILIRUB SERPL-MCNC: 1 MG/DL (ref 0–1)
BUN SERPL-MCNC: 10 MG/DL (ref 7–20)
CALCIUM SERPL-MCNC: 9.4 MG/DL (ref 8.3–10.6)
CHLORIDE SERPL-SCNC: 103 MMOL/L (ref 99–110)
CHOLEST SERPL-MCNC: 238 MG/DL (ref 0–199)
CO2 SERPL-SCNC: 25 MMOL/L (ref 21–32)
CREAT SERPL-MCNC: 0.7 MG/DL (ref 0.9–1.3)
DEPRECATED RDW RBC AUTO: 13.5 % (ref 12.4–15.4)
EOSINOPHIL # BLD: 0.3 K/UL (ref 0–0.6)
EOSINOPHIL NFR BLD: 4.1 %
EST. AVERAGE GLUCOSE BLD GHB EST-MCNC: 93.9 MG/DL
GFR SERPLBLD CREATININE-BSD FMLA CKD-EPI: >90 ML/MIN/{1.73_M2}
GLUCOSE SERPL-MCNC: 89 MG/DL (ref 70–99)
HBA1C MFR BLD: 4.9 %
HCT VFR BLD AUTO: 40.1 % (ref 40.5–52.5)
HDLC SERPL-MCNC: 66 MG/DL (ref 40–60)
HGB BLD-MCNC: 13.3 G/DL (ref 13.5–17.5)
LDLC SERPL CALC-MCNC: 151 MG/DL
LYMPHOCYTES # BLD: 1.8 K/UL (ref 1–5.1)
LYMPHOCYTES NFR BLD: 28.7 %
MCH RBC QN AUTO: 30.5 PG (ref 26–34)
MCHC RBC AUTO-ENTMCNC: 33.1 G/DL (ref 31–36)
MCV RBC AUTO: 92 FL (ref 80–100)
MONOCYTES # BLD: 0.5 K/UL (ref 0–1.3)
MONOCYTES NFR BLD: 7.1 %
NEUTROPHILS # BLD: 3.8 K/UL (ref 1.7–7.7)
NEUTROPHILS NFR BLD: 59.1 %
PLATELET # BLD AUTO: 284 K/UL (ref 135–450)
PMV BLD AUTO: 8.6 FL (ref 5–10.5)
POTASSIUM SERPL-SCNC: 4.5 MMOL/L (ref 3.5–5.1)
PROT SERPL-MCNC: 7.1 G/DL (ref 6.4–8.2)
RBC # BLD AUTO: 4.36 M/UL (ref 4.2–5.9)
SODIUM SERPL-SCNC: 140 MMOL/L (ref 136–145)
TRIGL SERPL-MCNC: 104 MG/DL (ref 0–150)
TSH SERPL DL<=0.005 MIU/L-ACNC: 1.97 UIU/ML (ref 0.27–4.2)
VLDLC SERPL CALC-MCNC: 21 MG/DL
WBC # BLD AUTO: 6.4 K/UL (ref 4–11)

## 2024-05-30 NOTE — TELEPHONE ENCOUNTER
Submitted PA for Rexulti 0.25MG tablets  Via CM (Key: [id number]) STATUS: PENDING.    Follow up done daily; if no decision with in three days we will refax.  If another three days goes by with no decision will call the insurance for status.

## 2024-05-30 NOTE — TELEPHONE ENCOUNTER
The medication is APPROVED.Your PA request for [id number] was approved for 365 days. The PA# assigned is [id number].. Authorization Expiration Date: May 29, 2025.     If this requires a response please respond to the pool ( P MHCX PSC MEDICATION PRE-AUTH).      Thank you please advise patient.

## 2024-05-31 DIAGNOSIS — D64.9 LOW HEMOGLOBIN: ICD-10-CM

## 2024-05-31 DIAGNOSIS — D64.9 LOW HEMATOCRIT: Primary | ICD-10-CM

## 2024-06-03 ENCOUNTER — NURSE ONLY (OUTPATIENT)
Dept: FAMILY MEDICINE CLINIC | Age: 34
End: 2024-06-03

## 2024-06-03 DIAGNOSIS — D64.9 LOW HEMOGLOBIN: ICD-10-CM

## 2024-06-03 DIAGNOSIS — D64.9 LOW HEMATOCRIT: ICD-10-CM

## 2024-06-03 PROCEDURE — 36415 COLL VENOUS BLD VENIPUNCTURE: CPT | Performed by: NURSE PRACTITIONER

## 2024-06-04 LAB
FERRITIN SERPL IA-MCNC: 73.1 NG/ML (ref 30–400)
FOLATE SERPL-MCNC: 4.79 NG/ML (ref 4.78–24.2)
IRON SATN MFR SERPL: 22 % (ref 20–50)
IRON SERPL-MCNC: 67 UG/DL (ref 59–158)
TIBC SERPL-MCNC: 305 UG/DL (ref 260–445)
VIT B12 SERPL-MCNC: 422 PG/ML (ref 211–911)

## 2024-06-20 ENCOUNTER — OFFICE VISIT (OUTPATIENT)
Dept: FAMILY MEDICINE CLINIC | Age: 34
End: 2024-06-20
Payer: MEDICAID

## 2024-06-20 ENCOUNTER — TELEPHONE (OUTPATIENT)
Dept: FAMILY MEDICINE CLINIC | Age: 34
End: 2024-06-20

## 2024-06-20 ENCOUNTER — HOSPITAL ENCOUNTER (OUTPATIENT)
Dept: CT IMAGING | Age: 34
Discharge: HOME | End: 2024-06-20
Payer: MEDICAID

## 2024-06-20 ENCOUNTER — HOSPITAL ENCOUNTER (OUTPATIENT)
Age: 34
Discharge: HOME | End: 2024-06-20
Payer: MEDICAID

## 2024-06-20 ENCOUNTER — HOSPITAL ENCOUNTER (OUTPATIENT)
Age: 34
End: 2024-06-20
Payer: MEDICAID

## 2024-06-20 ENCOUNTER — HOSPITAL ENCOUNTER (OUTPATIENT)
Dept: GENERAL RADIOLOGY | Age: 34
Discharge: HOME | End: 2024-06-20
Payer: MEDICAID

## 2024-06-20 VITALS
HEART RATE: 76 BPM | WEIGHT: 216 LBS | DIASTOLIC BLOOD PRESSURE: 84 MMHG | OXYGEN SATURATION: 99 % | SYSTOLIC BLOOD PRESSURE: 122 MMHG | HEIGHT: 73 IN | BODY MASS INDEX: 28.63 KG/M2

## 2024-06-20 DIAGNOSIS — G44.52 NEW PERSISTENT DAILY HEADACHE: Primary | ICD-10-CM

## 2024-06-20 DIAGNOSIS — M54.2 NECK PAIN, CHRONIC: ICD-10-CM

## 2024-06-20 DIAGNOSIS — G44.52 NEW PERSISTENT DAILY HEADACHE: ICD-10-CM

## 2024-06-20 DIAGNOSIS — G89.29 NECK PAIN, CHRONIC: ICD-10-CM

## 2024-06-20 PROCEDURE — 70450 CT HEAD/BRAIN W/O DYE: CPT

## 2024-06-20 PROCEDURE — 99213 OFFICE O/P EST LOW 20 MIN: CPT | Performed by: NURSE PRACTITIONER

## 2024-06-20 PROCEDURE — 72040 X-RAY EXAM NECK SPINE 2-3 VW: CPT

## 2024-06-20 SDOH — ECONOMIC STABILITY: FOOD INSECURITY: WITHIN THE PAST 12 MONTHS, YOU WORRIED THAT YOUR FOOD WOULD RUN OUT BEFORE YOU GOT MONEY TO BUY MORE.: NEVER TRUE

## 2024-06-20 SDOH — ECONOMIC STABILITY: FOOD INSECURITY: WITHIN THE PAST 12 MONTHS, THE FOOD YOU BOUGHT JUST DIDN'T LAST AND YOU DIDN'T HAVE MONEY TO GET MORE.: NEVER TRUE

## 2024-06-20 SDOH — ECONOMIC STABILITY: INCOME INSECURITY: HOW HARD IS IT FOR YOU TO PAY FOR THE VERY BASICS LIKE FOOD, HOUSING, MEDICAL CARE, AND HEATING?: NOT HARD AT ALL

## 2024-06-20 ASSESSMENT — ENCOUNTER SYMPTOMS
SORE THROAT: 0
RESPIRATORY NEGATIVE: 1
WHEEZING: 0
EYE ITCHING: 0
TROUBLE SWALLOWING: 0
COUGH: 0
CHOKING: 0
APNEA: 0
DIARRHEA: 0
RHINORRHEA: 0
BLOOD IN STOOL: 0
STRIDOR: 0
VOMITING: 0
EYE PAIN: 0
COLOR CHANGE: 0
BACK PAIN: 0
GASTROINTESTINAL NEGATIVE: 1
SHORTNESS OF BREATH: 0
PHOTOPHOBIA: 0
EYE DISCHARGE: 0
NAUSEA: 0
ABDOMINAL PAIN: 0
SINUS PRESSURE: 0
ALLERGIC/IMMUNOLOGIC NEGATIVE: 1
CHEST TIGHTNESS: 0
EYE REDNESS: 0
CONSTIPATION: 0

## 2024-06-20 NOTE — TELEPHONE ENCOUNTER
Pt called and stated that he has a severe migraine. Was wanting to come in and be seen but  you have nothing so I suggested that he go to the ER and he told me that he doesn't have the money. So he was wanting to see if he cold get something called in to [facility] in [place].

## 2024-06-20 NOTE — PROGRESS NOTES
adverse effects. All medications effect each person differently. Please read and review provided information related to medication. If the medication that you have been prescribed has the potential to cause sedation, do not drive or operate car, truck, or heavy machinery until you know how the medication will effect you. If you experience any adverse effects from the medication, please call the office or report to the emergency department.      
Daughter

## 2024-06-20 NOTE — TELEPHONE ENCOUNTER
Patient called stating he had his CT done and asking if someone is going to call him within the next hour with the results since we close in an hour.  Please call patient and advise.

## 2024-06-20 NOTE — PATIENT INSTRUCTIONS
[facility] 7 am - 7:30 pm  [address], [facility] [address]  [phone]    [facility] 8 am - 8 pm  [address] [place]  [address]  [phone]

## 2024-06-22 DIAGNOSIS — M54.32 SCIATICA OF LEFT SIDE: ICD-10-CM

## 2024-06-24 RX ORDER — NAPROXEN 500 MG/1
500 TABLET ORAL 2 TIMES DAILY WITH MEALS
Qty: 60 TABLET | Refills: 0 | Status: SHIPPED | OUTPATIENT
Start: 2024-06-24

## 2024-06-24 NOTE — TELEPHONE ENCOUNTER
Refill Request     CONFIRM preferrred pharmacy with the patient.    If Mail Order Rx - Pend for 90 day refill.      Last Seen: Last Seen Department: 6/20/2024  Last Seen by PCP: 5/29/2024    Last Written: 5/29/2024    If no future appointment scheduled, route STAFF MESSAGE with patient name to the  Pool for scheduling.      Next Appointment:   Future Appointments   Date Time Provider Department Center   7/26/2024  1:20 PM Amanda Aggarwal, APRN - [facility] [place] [facility]   7/29/2024  2:15 PM Rob Jernigan MD PLASTICS/REC [facility]       Message sent to  to schedule appt with patient?  NO      Requested Prescriptions     Pending Prescriptions Disp Refills    naproxen (NAPROSYN) 500 MG tablet [Pharmacy Med Name: Naproxen 500 MG Oral Tablet] 60 tablet 0     Sig: TAKE 1 TABLET BY MOUTH TWICE DAILY WITH MEALS

## 2024-06-27 ENCOUNTER — TELEPHONE (OUTPATIENT)
Dept: FAMILY MEDICINE CLINIC | Age: 34
End: 2024-06-27

## 2024-06-27 ENCOUNTER — OFFICE VISIT (OUTPATIENT)
Dept: FAMILY MEDICINE CLINIC | Age: 34
End: 2024-06-27
Payer: MEDICAID

## 2024-06-27 VITALS
WEIGHT: 213 LBS | SYSTOLIC BLOOD PRESSURE: 124 MMHG | OXYGEN SATURATION: 97 % | HEIGHT: 72 IN | BODY MASS INDEX: 28.85 KG/M2 | DIASTOLIC BLOOD PRESSURE: 88 MMHG | HEART RATE: 71 BPM

## 2024-06-27 DIAGNOSIS — G89.29 CHRONIC NONINTRACTABLE HEADACHE, UNSPECIFIED HEADACHE TYPE: Primary | ICD-10-CM

## 2024-06-27 DIAGNOSIS — N48.9 PENILE DISORDER: ICD-10-CM

## 2024-06-27 DIAGNOSIS — M54.32 SCIATICA OF LEFT SIDE: ICD-10-CM

## 2024-06-27 DIAGNOSIS — R51.9 CHRONIC NONINTRACTABLE HEADACHE, UNSPECIFIED HEADACHE TYPE: Primary | ICD-10-CM

## 2024-06-27 PROCEDURE — 99213 OFFICE O/P EST LOW 20 MIN: CPT | Performed by: NURSE PRACTITIONER

## 2024-06-27 RX ORDER — GUAIFENESIN 600 MG/1
600 TABLET, EXTENDED RELEASE ORAL 2 TIMES DAILY
Qty: 30 TABLET | Refills: 0 | Status: SHIPPED | OUTPATIENT
Start: 2024-06-27 | End: 2024-07-02 | Stop reason: SDUPTHER

## 2024-06-27 RX ORDER — SUMATRIPTAN 50 MG/1
TABLET, FILM COATED ORAL
Qty: 9 TABLET | Refills: 0 | Status: ON HOLD | OUTPATIENT
Start: 2024-06-27

## 2024-06-27 RX ORDER — LACTOBACILLUS RHAMNOSUS GG 10B CELL
1 CAPSULE ORAL 2 TIMES DAILY
Qty: 60 CAPSULE | Refills: 5 | Status: SHIPPED | OUTPATIENT
Start: 2024-06-27 | End: 2024-07-02 | Stop reason: SDUPTHER

## 2024-06-27 RX ORDER — KETOROLAC TROMETHAMINE 30 MG/ML
60 INJECTION, SOLUTION INTRAMUSCULAR; INTRAVENOUS ONCE
Status: COMPLETED | OUTPATIENT
Start: 2024-06-27 | End: 2024-06-27

## 2024-06-27 RX ADMIN — KETOROLAC TROMETHAMINE 60 MG: 30 INJECTION, SOLUTION INTRAMUSCULAR; INTRAVENOUS at 17:55

## 2024-06-27 NOTE — TELEPHONE ENCOUNTER
Spoke w/ patient, he has refused the ED for evaluation, he stated that all the OTC medications with no help - he is not asking for narcotics, he wants to talk about a referral to see neurology and possible the ENT for his ongoing sinus headaches. He is asking for an Rx to be sent in for sudafed vs having to pay for it over the counter. Pt stated he did a home covid test via his  mom a week ago and it was negative. He stated that his on going low back pain has not improved and he feels if he goes to the ED for any \"pain related concerns\" that they will flag him as drug seeking when he isn't asking for drugs but is \"tired\" of having a constant headache     Pt will keep his appt today at 4:40 to see provider

## 2024-06-27 NOTE — TELEPHONE ENCOUNTER
Patient returned call and stated he does not want to go to the ED.  They do nothing for him.  Just wants to get in to see Amanda earlier if at all possible. Please advise.

## 2024-06-27 NOTE — TELEPHONE ENCOUNTER
Patient calling stating he is not feeling well, head is pounding.  States he tried doing what Ana said and the pressure is not going away.  States the only thing that helps is the marijuana.  States he feels fatigued, throat is hurting, he feels like he has Covid without losing his taste buds.  States he is in so much pain, he doesn't know what to do.  States he has so much sinus pressure he can't take it. States it is so bad he feels sick.  Wants to know if there is an earlier appointment with Amanda.  Please call patient and advise.

## 2024-06-27 NOTE — PROGRESS NOTES
[facility]  [facility]  [address] [place] [address]  Dept: [phone]  Dept Fax: [phone]  Loc: [phone]    Norman Garcia is a 34 y.o. male who presents today for his medical conditions/complaints as noted below.  Norman Garcia is c/o of No chief complaint on file.       Subjective:     No chief complaint on file.      HPI  The patient is very difficult to follow today.  It is unclear but it appears his headache is been going on for approximately 2 months.  He was just seen on 6/20/2024 for the new persistent headache.  Head CT was obtained on 6/20/2024 and showed no acute abnormality  He has no history of migraine headache  It is unclear of the source of his headache but he does describe it more of a tension headache where it starts in his neck and then moves to the frontal region.  Also concern for over use medication headache the patient at first stated he stopped taking Tylenol and any ibuprofen or other medications as recommended but then on the next sentence he tells me he took ibuprofen yesterday.  He was stating he wanted to see someone for his headache but then the patient was demanding something to help his headaches.  He stated he did not want pain medication but was asking something for his \"sinuses\" .  He is specifically asking for Sudafed  He denies any sinus or allergy type symptoms    Again the patient is stating that he has intermittent tenderness in 1 particular area of his penis after having penile enlargement.  He was offered urology consult at last appointment and declined.  He has had no changes in his symptoms.    Again he is complaining of having increased sciatic symptoms/flareup of his low back pain.  Pain does radiate down his left lower extremity.  He denies any paresthesias, loss of bowel or bladder function or extremity weakness.    Past Medical History:   Diagnosis Date    ADHD     Adult respiratory distress syndrome (HCC)

## 2024-06-27 NOTE — TELEPHONE ENCOUNTER
Pt's mom called and stated that she thinks he has a very manike behavior. Said that he takes meds that have been given to him and he takes them once and states that he can't take them they make him sick. He is not being honest with people and he calls them bullies. She is really worried about him and his behavior

## 2024-07-01 ASSESSMENT — ENCOUNTER SYMPTOMS
NAUSEA: 0
SHORTNESS OF BREATH: 0
RESPIRATORY NEGATIVE: 1
ABDOMINAL PAIN: 0
BACK PAIN: 1
ANAL BLEEDING: 0
ALLERGIC/IMMUNOLOGIC NEGATIVE: 1
EYES NEGATIVE: 1
BLOOD IN STOOL: 0
GASTROINTESTINAL NEGATIVE: 1

## 2024-07-02 DIAGNOSIS — F31.9 BIPOLAR 1 DISORDER (HCC): ICD-10-CM

## 2024-07-02 RX ORDER — BREXPIPRAZOLE 0.25 MG/1
0.25 TABLET ORAL DAILY
Qty: 30 TABLET | Refills: 0 | Status: ON HOLD | OUTPATIENT
Start: 2024-07-02

## 2024-07-02 RX ORDER — GUAIFENESIN 600 MG/1
600 TABLET, EXTENDED RELEASE ORAL 2 TIMES DAILY
Qty: 30 TABLET | Refills: 0 | OUTPATIENT
Start: 2024-07-02 | End: 2024-07-17

## 2024-07-02 RX ORDER — LACTOBACILLUS RHAMNOSUS GG 10B CELL
1 CAPSULE ORAL 2 TIMES DAILY
Qty: 60 CAPSULE | Refills: 5 | Status: ON HOLD | OUTPATIENT
Start: 2024-07-02

## 2024-07-02 RX ORDER — GUAIFENESIN 600 MG/1
600 TABLET, EXTENDED RELEASE ORAL 2 TIMES DAILY
Qty: 30 TABLET | Refills: 0 | Status: ON HOLD | OUTPATIENT
Start: 2024-07-02 | End: 2024-07-17

## 2024-07-02 NOTE — TELEPHONE ENCOUNTER
Refill Request     CONFIRM preferrred pharmacy with the patient.    If Mail Order Rx - Pend for 90 day refill.      Last Seen: Last Seen Department: 6/27/2024  Last Seen by PCP: 6/27/2024    Last Written: 5/29/24    If no future appointment scheduled, route STAFF MESSAGE with patient name to the  Pool for scheduling.      Next Appointment:   Future Appointments   Date Time Provider Department Center   7/26/2024  1:20 PM Amanda Aggarwal, APRN - CNP [facility]  [place] [facility] D[facility]   7/29/2024  2:15 PM Rob Jernigan MD PLASTICS/REC [facility]   10/10/2024  1:20 PM Aj Dave MD [facility] Neurology -       Message sent to  to schedule appt with patient?  N/A      Requested Prescriptions     Pending Prescriptions Disp Refills    REXULTI 0.25 MG TABS tablet [Pharmacy Med Name: Rexulti 0.25 MG Oral Tablet] 30 tablet 0     Sig: Take 1 tablet by mouth once daily

## 2024-07-05 ENCOUNTER — HOSPITAL ENCOUNTER (INPATIENT)
Age: 34
LOS: 4 days | Discharge: HOME | DRG: 753 | End: 2024-07-09
Attending: EMERGENCY MEDICINE | Admitting: PSYCHIATRY & NEUROLOGY
Payer: MEDICAID

## 2024-07-05 DIAGNOSIS — F30.10 MANIC BEHAVIOR (HCC): Primary | ICD-10-CM

## 2024-07-05 DIAGNOSIS — F39 MOOD DISORDER (HCC): ICD-10-CM

## 2024-07-05 PROBLEM — F31.10 BIPOLAR DISORDER, MOST RECENT EPISODE MANIC (HCC): Status: ACTIVE | Noted: 2024-07-05

## 2024-07-05 LAB
ALBUMIN SERPL-MCNC: 4.4 G/DL (ref 3.4–5)
ALBUMIN/GLOB SERPL: 1.4 {RATIO} (ref 1.1–2.2)
ALP SERPL-CCNC: 53 U/L (ref 40–129)
ALT SERPL-CCNC: 12 U/L (ref 10–40)
AMPHETAMINES UR QL SCN>1000 NG/ML: ABNORMAL
ANION GAP SERPL CALCULATED.3IONS-SCNC: 8 MMOL/L (ref 3–16)
APAP SERPL-MCNC: <5 UG/ML (ref 10–30)
AST SERPL-CCNC: 17 U/L (ref 15–37)
BARBITURATES UR QL SCN>200 NG/ML: ABNORMAL
BASOPHILS # BLD: 0 K/UL (ref 0–0.2)
BASOPHILS NFR BLD: 0.4 %
BENZODIAZ UR QL SCN>200 NG/ML: ABNORMAL
BILIRUB SERPL-MCNC: 0.6 MG/DL (ref 0–1)
BUN SERPL-MCNC: 10 MG/DL (ref 7–20)
CALCIUM SERPL-MCNC: 9.1 MG/DL (ref 8.3–10.6)
CANNABINOIDS UR QL SCN>50 NG/ML: POSITIVE
CHLORIDE SERPL-SCNC: 99 MMOL/L (ref 99–110)
CO2 SERPL-SCNC: 26 MMOL/L (ref 21–32)
COCAINE UR QL SCN: ABNORMAL
CREAT SERPL-MCNC: 0.7 MG/DL (ref 0.9–1.3)
DEPRECATED RDW RBC AUTO: 12.6 % (ref 12.4–15.4)
DRUG SCREEN COMMENT UR-IMP: ABNORMAL
EOSINOPHIL # BLD: 0.1 K/UL (ref 0–0.6)
EOSINOPHIL NFR BLD: 1.8 %
ETHANOLAMINE SERPL-MCNC: NORMAL MG/DL (ref 0–0.08)
FENTANYL SCREEN, URINE: ABNORMAL
GFR SERPLBLD CREATININE-BSD FMLA CKD-EPI: >90 ML/MIN/{1.73_M2}
GLUCOSE SERPL-MCNC: 103 MG/DL (ref 70–99)
HCT VFR BLD AUTO: 40.3 % (ref 40.5–52.5)
HGB BLD-MCNC: 13.9 G/DL (ref 13.5–17.5)
LYMPHOCYTES # BLD: 1.6 K/UL (ref 1–5.1)
LYMPHOCYTES NFR BLD: 21.8 %
MCH RBC QN AUTO: 31.3 PG (ref 26–34)
MCHC RBC AUTO-ENTMCNC: 34.5 G/DL (ref 31–36)
MCV RBC AUTO: 90.6 FL (ref 80–100)
METHADONE UR QL SCN>300 NG/ML: ABNORMAL
MONOCYTES # BLD: 0.6 K/UL (ref 0–1.3)
MONOCYTES NFR BLD: 7.7 %
NEUTROPHILS # BLD: 5 K/UL (ref 1.7–7.7)
NEUTROPHILS NFR BLD: 68.3 %
OPIATES UR QL SCN>300 NG/ML: ABNORMAL
OXYCODONE UR QL SCN: ABNORMAL
PCP UR QL SCN>25 NG/ML: ABNORMAL
PH UR STRIP: 7 [PH]
PLATELET # BLD AUTO: 263 K/UL (ref 135–450)
PMV BLD AUTO: 7.9 FL (ref 5–10.5)
POTASSIUM SERPL-SCNC: 4.2 MMOL/L (ref 3.5–5.1)
PROT SERPL-MCNC: 7.6 G/DL (ref 6.4–8.2)
RBC # BLD AUTO: 4.44 M/UL (ref 4.2–5.9)
SALICYLATES SERPL-MCNC: <0.3 MG/DL (ref 15–30)
SODIUM SERPL-SCNC: 133 MMOL/L (ref 136–145)
WBC # BLD AUTO: 7.3 K/UL (ref 4–11)

## 2024-07-05 PROCEDURE — 6370000000 HC RX 637 (ALT 250 FOR IP): Performed by: EMERGENCY MEDICINE

## 2024-07-05 PROCEDURE — 6360000002 HC RX W HCPCS: Performed by: EMERGENCY MEDICINE

## 2024-07-05 PROCEDURE — 85025 COMPLETE CBC W/AUTO DIFF WBC: CPT

## 2024-07-05 PROCEDURE — 80179 DRUG ASSAY SALICYLATE: CPT

## 2024-07-05 PROCEDURE — 82077 ASSAY SPEC XCP UR&BREATH IA: CPT

## 2024-07-05 PROCEDURE — 1240000000 HC EMOTIONAL WELLNESS R&B

## 2024-07-05 PROCEDURE — 80143 DRUG ASSAY ACETAMINOPHEN: CPT

## 2024-07-05 PROCEDURE — 80307 DRUG TEST PRSMV CHEM ANLYZR: CPT

## 2024-07-05 PROCEDURE — 80053 COMPREHEN METABOLIC PANEL: CPT

## 2024-07-05 PROCEDURE — 99285 EMERGENCY DEPT VISIT HI MDM: CPT

## 2024-07-05 PROCEDURE — 96372 THER/PROPH/DIAG INJ SC/IM: CPT

## 2024-07-05 PROCEDURE — 36415 COLL VENOUS BLD VENIPUNCTURE: CPT

## 2024-07-05 RX ORDER — LORAZEPAM 2 MG/ML
2 INJECTION INTRAMUSCULAR ONCE
Status: COMPLETED | OUTPATIENT
Start: 2024-07-05 | End: 2024-07-05

## 2024-07-05 RX ORDER — ZIPRASIDONE MESYLATE 20 MG/ML
20 INJECTION, POWDER, LYOPHILIZED, FOR SOLUTION INTRAMUSCULAR ONCE
Status: COMPLETED | OUTPATIENT
Start: 2024-07-05 | End: 2024-07-06

## 2024-07-05 RX ORDER — POLYETHYLENE GLYCOL 3350 17 G
2 POWDER IN PACKET (EA) ORAL
Status: DISCONTINUED | OUTPATIENT
Start: 2024-07-05 | End: 2024-07-06 | Stop reason: SDUPTHER

## 2024-07-05 RX ADMIN — NICOTINE POLACRILEX 2 MG: 2 LOZENGE ORAL at 19:22

## 2024-07-05 RX ADMIN — NICOTINE POLACRILEX 2 MG: 2 LOZENGE ORAL at 16:50

## 2024-07-05 RX ADMIN — LORAZEPAM 2 MG: 2 INJECTION INTRAMUSCULAR; INTRAVENOUS at 16:50

## 2024-07-05 ASSESSMENT — LIFESTYLE VARIABLES
HOW OFTEN DO YOU HAVE A DRINK CONTAINING ALCOHOL: NEVER
HOW MANY STANDARD DRINKS CONTAINING ALCOHOL DO YOU HAVE ON A TYPICAL DAY: PATIENT DOES NOT DRINK

## 2024-07-05 ASSESSMENT — PAIN - FUNCTIONAL ASSESSMENT: PAIN_FUNCTIONAL_ASSESSMENT: NONE - DENIES PAIN

## 2024-07-05 NOTE — ED NOTES
1502- Attempted blood draw. Pt pulled needle out of arm while drawling blood and stated \"you are doing it to rough\". RN notified

## 2024-07-05 NOTE — ED NOTES
Pt very disruptive at times.  Reminded multiple times to lower his voice and not go in other rooms.  Constantly wanting something else to eat, then when he gets it he doesn't want it.   Flight of ideas and unable to contain himself.  Requested ativan Im to help him relax.  N.O. for ativan 2 mg Im given to right deltoid.

## 2024-07-06 PROCEDURE — 6360000002 HC RX W HCPCS: Performed by: EMERGENCY MEDICINE

## 2024-07-06 PROCEDURE — 6360000002 HC RX W HCPCS: Performed by: PSYCHIATRY & NEUROLOGY

## 2024-07-06 PROCEDURE — 99223 1ST HOSP IP/OBS HIGH 75: CPT | Performed by: PSYCHIATRY & NEUROLOGY

## 2024-07-06 PROCEDURE — 1240000000 HC EMOTIONAL WELLNESS R&B

## 2024-07-06 PROCEDURE — 6370000000 HC RX 637 (ALT 250 FOR IP): Performed by: PSYCHIATRY & NEUROLOGY

## 2024-07-06 PROCEDURE — 2580000003 HC RX 258

## 2024-07-06 RX ORDER — LORAZEPAM 2 MG/ML
2 INJECTION INTRAMUSCULAR ONCE
Status: COMPLETED | OUTPATIENT
Start: 2024-07-06 | End: 2024-07-06

## 2024-07-06 RX ORDER — WATER 10 ML/10ML
INJECTION INTRAMUSCULAR; INTRAVENOUS; SUBCUTANEOUS
Status: COMPLETED
Start: 2024-07-06 | End: 2024-07-06

## 2024-07-06 RX ORDER — LORAZEPAM 2 MG/ML
2 INJECTION INTRAMUSCULAR 3 TIMES DAILY PRN
Status: DISCONTINUED | OUTPATIENT
Start: 2024-07-06 | End: 2024-07-09 | Stop reason: HOSPADM

## 2024-07-06 RX ORDER — HYDROXYZINE 50 MG/1
50 TABLET, FILM COATED ORAL 3 TIMES DAILY PRN
Status: DISCONTINUED | OUTPATIENT
Start: 2024-07-06 | End: 2024-07-09 | Stop reason: HOSPADM

## 2024-07-06 RX ORDER — OLANZAPINE 10 MG/1
10 TABLET ORAL 2 TIMES DAILY PRN
Status: DISCONTINUED | OUTPATIENT
Start: 2024-07-06 | End: 2024-07-06

## 2024-07-06 RX ORDER — WATER 10 ML/10ML
10 INJECTION INTRAMUSCULAR; INTRAVENOUS; SUBCUTANEOUS ONCE
Status: DISCONTINUED | OUTPATIENT
Start: 2024-07-06 | End: 2024-07-08

## 2024-07-06 RX ORDER — POLYETHYLENE GLYCOL 3350 17 G
2 POWDER IN PACKET (EA) ORAL
Status: DISCONTINUED | OUTPATIENT
Start: 2024-07-06 | End: 2024-07-09 | Stop reason: HOSPADM

## 2024-07-06 RX ORDER — ACETAMINOPHEN 325 MG/1
650 TABLET ORAL EVERY 8 HOURS PRN
Status: DISCONTINUED | OUTPATIENT
Start: 2024-07-06 | End: 2024-07-09 | Stop reason: HOSPADM

## 2024-07-06 RX ORDER — OLANZAPINE 5 MG/1
5 TABLET ORAL 4 TIMES DAILY PRN
Status: DISCONTINUED | OUTPATIENT
Start: 2024-07-06 | End: 2024-07-09 | Stop reason: HOSPADM

## 2024-07-06 RX ORDER — NICOTINE 21 MG/24HR
1 PATCH, TRANSDERMAL 24 HOURS TRANSDERMAL DAILY
Status: DISCONTINUED | OUTPATIENT
Start: 2024-07-06 | End: 2024-07-09 | Stop reason: HOSPADM

## 2024-07-06 RX ADMIN — NICOTINE POLACRILEX 2 MG: 2 LOZENGE ORAL at 12:41

## 2024-07-06 RX ADMIN — NICOTINE POLACRILEX 2 MG: 2 LOZENGE ORAL at 09:14

## 2024-07-06 RX ADMIN — NICOTINE POLACRILEX 2 MG: 2 LOZENGE ORAL at 10:20

## 2024-07-06 RX ADMIN — OLANZAPINE 5 MG: 5 TABLET, FILM COATED ORAL at 15:15

## 2024-07-06 RX ADMIN — NICOTINE POLACRILEX 2 MG: 2 LOZENGE ORAL at 19:19

## 2024-07-06 RX ADMIN — LORAZEPAM 2 MG: 2 INJECTION INTRAMUSCULAR; INTRAVENOUS at 10:20

## 2024-07-06 RX ADMIN — LORAZEPAM 2 MG: 2 INJECTION INTRAMUSCULAR; INTRAVENOUS at 16:14

## 2024-07-06 RX ADMIN — NICOTINE POLACRILEX 2 MG: 2 LOZENGE ORAL at 15:16

## 2024-07-06 RX ADMIN — ZIPRASIDONE MESYLATE 20 MG: 20 INJECTION, POWDER, LYOPHILIZED, FOR SOLUTION INTRAMUSCULAR at 01:38

## 2024-07-06 RX ADMIN — NICOTINE POLACRILEX 2 MG: 2 LOZENGE ORAL at 17:27

## 2024-07-06 RX ADMIN — WATER 10 ML: 1 INJECTION INTRAMUSCULAR; INTRAVENOUS; SUBCUTANEOUS at 01:52

## 2024-07-06 ASSESSMENT — PATIENT HEALTH QUESTIONNAIRE - PHQ9
SUM OF ALL RESPONSES TO PHQ QUESTIONS 1-9: 0
SUM OF ALL RESPONSES TO PHQ9 QUESTIONS 1 & 2: 0
1. LITTLE INTEREST OR PLEASURE IN DOING THINGS: NOT AT ALL
SUM OF ALL RESPONSES TO PHQ QUESTIONS 1-9: 0
2. FEELING DOWN, DEPRESSED OR HOPELESS: NOT AT ALL

## 2024-07-06 ASSESSMENT — SLEEP AND FATIGUE QUESTIONNAIRES
DO YOU HAVE DIFFICULTY SLEEPING: NO
SLEEP PATTERN: DIFFICULTY FALLING ASLEEP
DO YOU USE A SLEEP AID: YES
AVERAGE NUMBER OF SLEEP HOURS: 5

## 2024-07-06 ASSESSMENT — LIFESTYLE VARIABLES
HOW OFTEN DO YOU HAVE A DRINK CONTAINING ALCOHOL: NEVER
HOW OFTEN DO YOU HAVE A DRINK CONTAINING ALCOHOL: PATIENT DECLINED
HOW MANY STANDARD DRINKS CONTAINING ALCOHOL DO YOU HAVE ON A TYPICAL DAY: PATIENT DOES NOT DRINK
HOW MANY STANDARD DRINKS CONTAINING ALCOHOL DO YOU HAVE ON A TYPICAL DAY: PATIENT DECLINED

## 2024-07-06 NOTE — PROGRESS NOTES
Behavioral Services  Medicare Certification Upon Admission    I certify that this patient's inpatient psychiatric hospital admission is medically necessary for:    [x] (1) Treatment which could reasonably be expected to improve this patient's condition,       [x] (2) Or for diagnostic study;     AND     [x](2) The inpatient psychiatric services are provided while the individual is under the care of a physician and are included in the individualized plan of care.    Estimated length of stay/service 5-8 days    Plan for post-hospital care incomplete     Electronically signed by JORDYN SNYDER MD on 7/6/2024 at 10:17 AM

## 2024-07-06 NOTE — ED PROVIDER NOTES
[facility] ED      CHIEF COMPLAINT  Psychiatric Evaluation (Pt arrives by private vehicle stating he is here to check himself into psych; repeating over and over \"I am not manic\"  denies S/I or H/I at this time; denies drug/alcohol use today.  )       HISTORY OF PRESENT ILLNESS  Norman Garcia is a 34 y.o. male  who presents to the ED complaining of concern for need for behavioral evaluation.  Patient with pressured, rambling speech, stating that he needs to see the psychiatrist here.  Denies SI/HI.  Denies recent illness.  Denies ETOH intake.  Does smoke and uses marijuana.      No other complaints, modifying factors or associated symptoms.     I have reviewed the following from the nursing documentation.    Past Medical History:   Diagnosis Date    ADHD     Adult respiratory distress syndrome (HCC)     ICU     Anxiety     Bipolar disorder     Cognitive disorder     Finger fracture, left 11/22/2017    GERD (gastroesophageal reflux disease)     Hiatal hernia 01/19/2018    Hypertension     Kidney stones     MR (mental retardation), moderate     Nausea and vomiting     Pneumococcal pneumonia (HCC)     Seizures ([facility])     pt denied any seizures ever     Past Surgical History:   Procedure Laterality Date    CHOLECYSTECTOMY      MOUTH SURGERY      MOUTH SURGERY       Family History   Problem Relation Age of Onset    Depression Mother     Mental Illness Maternal Uncle     Arthritis Paternal Grandmother      Social History     Socioeconomic History    Marital status:      Spouse name: N/A    Number of children: 0    Years of education: 12    Highest education level: Not on file   Occupational History    Occupation: works on farm     Comment: Farm work   Tobacco Use    Smoking status: Some Days     Current packs/day: 0.50     Average packs/day: 0.5 packs/day for 10.0 years (5.0 ttl pk-yrs)     Types: Cigarettes    Smokeless tobacco: Current     Types: Chew   Vaping Use    Vaping Use: Some days

## 2024-07-06 NOTE — PROGRESS NOTES
[facility]  Admission Note     Admission Type:   Admission Type: Voluntary    Reason for admission:  Reason for Admission: psychosis      Addictive Behavior:   Addictive Behavior  In the Past 3 Months, Have You Felt or Has Someone Told You That You Have a Problem With  : None    Medical Problems:   Past Medical History:   Diagnosis Date    ADHD     Adult respiratory distress syndrome (HCC)     ICU     Anxiety     Bipolar disorder     Cognitive disorder     Finger fracture, left 11/22/2017    GERD (gastroesophageal reflux disease)     Hiatal hernia 01/19/2018    Hypertension     Kidney stones     MR (mental retardation), moderate     Nausea and vomiting     Pneumococcal pneumonia ([facility])     Seizures ([facility])     pt denied any seizures ever       Status EXAM:  Mental Status and Behavioral Exam  Normal: No  Level of Assistance: Independent/Self  Facial Expression: Exaggerated  Affect: Constricted  Level of Consciousness: Alert  Frequency of Checks: 4 times per hour, close  Mood:Normal: No  Mood: Anxious  Motor Activity:Normal: Yes  Eye Contact: Good  Observed Behavior: Cooperative, Friendly  Sexual Misconduct History: Current - no  Preception: [place] to person, [place] to time, [place] to place, [place] to situation  Attention:Normal: No  Attention: Distractible, Unable to concentrate  Thought Processes: Flight of ideas, Loose association  Thought Content:Normal: No  Thought Content: Delusions  Depression Symptoms: No problems reported or observed.  Anxiety Symptoms: Generalized  Kayli Symptoms: Flight of ideas  Hallucinations: None (denies)  Delusions: Yes  Delusions: Grandeur  Memory:Normal: No  Memory: Confabulation  Insight and Judgment: No  Insight and Judgment: Poor judgment, Poor insight    Tobacco Screening:  Practical Counseling, on admission, benedicto X, if applicable and completed (first 3 are required if patient doesn't refuse):            ( ) Recognizing danger situations (included triggers
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
## 2024-07-06 NOTE — VIRTUAL HEALTH
TABS tablet, Take 1 tablet by mouth once daily, Disp: 30 tablet, Rfl: 0    lactobacillus (CULTURELLE) capsule, Take 1 capsule by mouth 2 times daily, Disp: 60 capsule, Rfl: 5    guaiFENesin (MUCINEX) 600 MG extended release tablet, Take 1 tablet by mouth 2 times daily for 15 days, Disp: 30 tablet, Rfl: 0    SUMAtriptan (IMITREX) 50 MG tablet, Take 1 tablet at first sign of headache.  May repeat in 2 hr if headache recurs.  Maximum dose- 2 tablets/24 hr., Disp: 9 tablet, Rfl: 0    naproxen (NAPROSYN) 500 MG tablet, TAKE 1 TABLET BY MOUTH TWICE DAILY WITH MEALS, Disp: 60 tablet, Rfl: 0    nicotine polacrilex (NICOTINE MINI) 2 MG lozenge, Take 1 lozenge by mouth as needed for Smoking cessation, Disp: 100 each, Rfl: 3  Not in a hospital admission.  Prior to Admission medications    Medication Sig Start Date End Date Taking? Authorizing Provider   REXULTI 0.25 MG TABS tablet Take 1 tablet by mouth once daily 7/2/24   Amanda Aggarwal APRN - CNP   lactobacillus (CULTURELLE) capsule Take 1 capsule by mouth 2 times daily 7/2/24   Amanda Aggarwal APRN - CNP   guaiFENesin (MUCINEX) 600 MG extended release tablet Take 1 tablet by mouth 2 times daily for 15 days 7/2/24 7/17/24  López Avalos MD   SUMAtriptan (IMITREX) 50 MG tablet Take 1 tablet at first sign of headache.  May repeat in 2 hr if headache recurs.  Maximum dose- 2 tablets/24 hr. 6/27/24   Amanda Aggarwal APRN - CNP   naproxen (NAPROSYN) 500 MG tablet TAKE 1 TABLET BY MOUTH TWICE DAILY WITH MEALS 6/24/24   Amanda Aggarwal APRN - CNP   nicotine polacrilex (NICOTINE MINI) 2 MG lozenge Take 1 lozenge by mouth as needed for Smoking cessation 5/29/24   Amanda Aggarwal APRN - CNP        Labs:  UDS: + marijuana  ETOH: negative        Mental Status Exam:  Level of consciousness:  within normal limits and arousable to calling his name    Appearance:  hospital attire.  Does appear stated age. No acute distress.  Behavior/Motor:  no

## 2024-07-06 NOTE — PROGRESS NOTES
4 Eyes Skin Assessment     NAME:  Norman Garcia  YOB: 1990  MEDICAL RECORD NUMBER:  [id number]    The patient is being assessed for  Admission    I agree that at least one RN has performed a thorough Head to Toe Skin Assessment on the patient. ALL assessment sites listed below have been assessed.      Areas assessed by both nurses:    Head, Face, Ears, Shoulders, Back, Chest, Arms, Elbows, Hands, Sacrum. Buttock, Coccyx, Ischium, Legs. Feet and Heels, and Under Medical Devices         Physical assessment done, noted with bruise small bruise on right side eye.   Does the Patient have a Wound? No noted wound(s)       Dominick Prevention initiated by RN: No  Wound Care Orders initiated by RN: No    Pressure Injury (Stage 3,4, Unstageable, DTI, NWPT, and Complex wounds) if present, place Wound referral order by RN under : No    New Ostomies, if present place, Ostomy referral order under : No     Nurse 1 eSignature: Electronically signed by Harley Ku RN on 7/6/24 at 2:43 AM EDT    **SHARE this note so that the co-signing nurse can place an eSignature**    Nurse 2 eSignature: {Esignature:[id number]}

## 2024-07-06 NOTE — H&P
Hospital Medicine History & Physical      PCP: Amanda Aggarwal APRN - CNP    Date of Admission: 7/5/2024    Date of Service: Pt seen/examined on 7/6/2024     Chief Complaint:    Chief Complaint   Patient presents with    Psychiatric Evaluation     Pt arrives by private vehicle stating he is here to check himself into psych; repeating over and over \"I am not manic\"  denies S/I or H/I at this time; denies drug/alcohol use today.           History Of Present Illness:      The patient is a 34 y.o. male with pmhx of anxiety, bipolar disorder, migraines who presented to [facility] for concerns for manic behavior.  Patient was seen and evaluated in the ED by the ED medical provider, patient was medically cleared for admission [facility]  This note serves as an admission medical H&P.    Tobacco use: 0.5 ppd   ETOH use: None   Illicit drug use: Marijuana     Patient denies any medical complaints     Past Medical History:        Diagnosis Date    ADHD     Adult respiratory distress syndrome (HCC)     ICU     Anxiety     Bipolar disorder     Cognitive disorder     Finger fracture, left 11/22/2017    GERD (gastroesophageal reflux disease)     Hiatal hernia 01/19/2018    Hypertension     Kidney stones     MR (mental retardation), moderate     Nausea and vomiting     Pneumococcal pneumonia (HCC)     Seizures ([facility])     pt denied any seizures ever       Past Surgical History:        Procedure Laterality Date    CHOLECYSTECTOMY      MOUTH SURGERY      MOUTH SURGERY         Medications Prior to Admission:    Prior to Admission medications    Medication Sig Start Date End Date Taking? Authorizing Provider   REXULTI 0.25 MG TABS tablet Take 1 tablet by mouth once daily 7/2/24   Amanda Aggarwal APRN - CNP   lactobacillus (CULTURELLE) capsule Take 1 capsule by mouth 2 times daily 7/2/24   Amanda Aggarwal APRN - CNP   guaiFENesin (MUCINEX) 600 MG extended release tablet Take 1 tablet by mouth 2 times daily 
consult for admission.  4. Collateral information if available for diagnostic clarification and care coordination.  5. Estimated length of stay 5 to 8 days for stabilization.  He is voluntary.    75 minutes was spent with the patient completing this evaluation and more than 50% of time was spent completing this evaluation, providing counseling, and planning treatment with the patient.          JORDYN SNYDER MD      D:  07/06/2024 10:16:41     T:  07/06/2024 12:44:45     MAYELA/SHIRLEY  Job #:  [id number]     Doc#:  [id number]
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
## 2024-07-06 NOTE — ED NOTES
Pt stated \"I need more ativan and Geodon. I have anxiety\" pt informed tele-psych is getting ready to speak with him, meds are ordered for after if needed.

## 2024-07-06 NOTE — TRANSFER CENTER NOTE
Prior to Admission:   Prior to Admission Medications   Prescriptions Last Dose Informant Patient Reported? Taking?   REXULTI 0.25 MG TABS tablet   No No   Sig: Take 1 tablet by mouth once daily   SUMAtriptan (IMITREX) 50 MG tablet   No No   Sig: Take 1 tablet at first sign of headache.  May repeat in 2 hr if headache recurs.  Maximum dose- 2 tablets/24 hr.   guaiFENesin (MUCINEX) 600 MG extended release tablet   No No   Sig: Take 1 tablet by mouth 2 times daily for 15 days   lactobacillus (CULTURELLE) capsule   No No   Sig: Take 1 capsule by mouth 2 times daily   naproxen (NAPROSYN) 500 MG tablet   No No   Sig: TAKE 1 TABLET BY MOUTH TWICE DAILY WITH MEALS   nicotine polacrilex (NICOTINE MINI) 2 MG lozenge   No No   Sig: Take 1 lozenge by mouth as needed for Smoking cessation      Facility-Administered Medications: None          Additional Information  Isolation:      HIV Positive: no    Oxygen Use: No    Any Legal Issues (Current or Past): unknown    Does Patient have POA or Guardian: No    Current Living Situation:      Roommate Appropriate: Yes    Is this a special case or have any other considerations:  No  (pregnancy, autism, developmental disabled, etc.)    Does the patient have confirmed or suspected COVID-19 Symptoms: No  (if yes, test must be completed, if no test is not required)       Last COVID Lab SARS-CoV-2 RNA, RT PCR (no units)   Date Value   08/02/2022 NOT DETECTED              Immunization status:   Immunization History   Administered Date(s) Administered    PPD Test 05/18/2016    TDaP, ADACEL (age 10y-64y), BOOSTRIX (age 10y+), IM, 0.5mL 01/01/2002    Td, unspecified formulation 06/07/2012

## 2024-07-07 PROBLEM — F30.10 MANIC BEHAVIOR (HCC): Status: ACTIVE | Noted: 2024-07-07

## 2024-07-07 PROCEDURE — 6370000000 HC RX 637 (ALT 250 FOR IP)

## 2024-07-07 PROCEDURE — 6360000002 HC RX W HCPCS

## 2024-07-07 PROCEDURE — 6360000002 HC RX W HCPCS: Performed by: PSYCHIATRY & NEUROLOGY

## 2024-07-07 PROCEDURE — 99221 1ST HOSP IP/OBS SF/LOW 40: CPT

## 2024-07-07 PROCEDURE — 1240000000 HC EMOTIONAL WELLNESS R&B

## 2024-07-07 PROCEDURE — 6370000000 HC RX 637 (ALT 250 FOR IP): Performed by: PSYCHIATRY & NEUROLOGY

## 2024-07-07 RX ORDER — ZIPRASIDONE MESYLATE 20 MG/ML
10 INJECTION, POWDER, LYOPHILIZED, FOR SOLUTION INTRAMUSCULAR ONCE
Status: COMPLETED | OUTPATIENT
Start: 2024-07-07 | End: 2024-07-07

## 2024-07-07 RX ORDER — DIPHENHYDRAMINE HCL 25 MG
50 TABLET ORAL ONCE
Status: COMPLETED | OUTPATIENT
Start: 2024-07-07 | End: 2024-07-07

## 2024-07-07 RX ORDER — HALOPERIDOL 10 MG/1
10 TABLET ORAL ONCE
Status: DISCONTINUED | OUTPATIENT
Start: 2024-07-07 | End: 2024-07-09

## 2024-07-07 RX ADMIN — NICOTINE POLACRILEX 2 MG: 2 LOZENGE ORAL at 02:43

## 2024-07-07 RX ADMIN — NICOTINE POLACRILEX 2 MG: 2 LOZENGE ORAL at 20:29

## 2024-07-07 RX ADMIN — NICOTINE POLACRILEX 2 MG: 2 LOZENGE ORAL at 12:42

## 2024-07-07 RX ADMIN — NICOTINE POLACRILEX 2 MG: 2 LOZENGE ORAL at 09:27

## 2024-07-07 RX ADMIN — NICOTINE POLACRILEX 2 MG: 2 LOZENGE ORAL at 17:34

## 2024-07-07 RX ADMIN — LORAZEPAM 2 MG: 2 INJECTION INTRAMUSCULAR; INTRAVENOUS at 20:21

## 2024-07-07 RX ADMIN — NICOTINE POLACRILEX 2 MG: 2 LOZENGE ORAL at 13:46

## 2024-07-07 RX ADMIN — NICOTINE POLACRILEX 2 MG: 2 LOZENGE ORAL at 15:56

## 2024-07-07 RX ADMIN — HYDROXYZINE HYDROCHLORIDE 50 MG: 50 TABLET, FILM COATED ORAL at 18:01

## 2024-07-07 RX ADMIN — NICOTINE POLACRILEX 2 MG: 2 LOZENGE ORAL at 11:38

## 2024-07-07 RX ADMIN — NICOTINE POLACRILEX 2 MG: 2 LOZENGE ORAL at 19:12

## 2024-07-07 RX ADMIN — OLANZAPINE 5 MG: 5 TABLET, FILM COATED ORAL at 09:26

## 2024-07-07 RX ADMIN — NICOTINE POLACRILEX 2 MG: 2 LOZENGE ORAL at 06:43

## 2024-07-07 RX ADMIN — LORAZEPAM 2 MG: 2 INJECTION INTRAMUSCULAR; INTRAVENOUS at 09:28

## 2024-07-07 RX ADMIN — ZIPRASIDONE MESYLATE 10 MG: 20 INJECTION, POWDER, LYOPHILIZED, FOR SOLUTION INTRAMUSCULAR at 14:15

## 2024-07-07 RX ADMIN — DIPHENHYDRAMINE HCL 50 MG: 25 TABLET ORAL at 22:07

## 2024-07-07 RX ADMIN — OLANZAPINE 5 MG: 5 TABLET, FILM COATED ORAL at 21:19

## 2024-07-08 PROCEDURE — 99233 SBSQ HOSP IP/OBS HIGH 50: CPT

## 2024-07-08 PROCEDURE — 1240000000 HC EMOTIONAL WELLNESS R&B

## 2024-07-08 PROCEDURE — 6370000000 HC RX 637 (ALT 250 FOR IP): Performed by: PSYCHIATRY & NEUROLOGY

## 2024-07-08 PROCEDURE — 6370000000 HC RX 637 (ALT 250 FOR IP)

## 2024-07-08 PROCEDURE — 6360000002 HC RX W HCPCS: Performed by: PSYCHIATRY & NEUROLOGY

## 2024-07-08 RX ORDER — ARIPIPRAZOLE 10 MG/1
5 TABLET ORAL DAILY
Status: DISCONTINUED | OUTPATIENT
Start: 2024-07-08 | End: 2024-07-09

## 2024-07-08 RX ORDER — HALOPERIDOL 5 MG/ML
10 INJECTION INTRAMUSCULAR EVERY 6 HOURS PRN
Status: DISCONTINUED | OUTPATIENT
Start: 2024-07-08 | End: 2024-07-09 | Stop reason: HOSPADM

## 2024-07-08 RX ADMIN — NICOTINE POLACRILEX 2 MG: 2 LOZENGE ORAL at 17:03

## 2024-07-08 RX ADMIN — LORAZEPAM 2 MG: 2 INJECTION INTRAMUSCULAR; INTRAVENOUS at 07:39

## 2024-07-08 RX ADMIN — NICOTINE POLACRILEX 2 MG: 2 LOZENGE ORAL at 09:56

## 2024-07-08 RX ADMIN — ACETAMINOPHEN 650 MG: 325 TABLET ORAL at 19:54

## 2024-07-08 RX ADMIN — NICOTINE POLACRILEX 2 MG: 2 LOZENGE ORAL at 19:54

## 2024-07-08 RX ADMIN — NICOTINE POLACRILEX 2 MG: 2 LOZENGE ORAL at 22:45

## 2024-07-08 RX ADMIN — NICOTINE POLACRILEX 2 MG: 2 LOZENGE ORAL at 04:22

## 2024-07-08 RX ADMIN — ARIPIPRAZOLE 5 MG: 10 TABLET ORAL at 11:56

## 2024-07-08 RX ADMIN — LORAZEPAM 2 MG: 2 INJECTION INTRAMUSCULAR; INTRAVENOUS at 19:54

## 2024-07-08 RX ADMIN — NICOTINE POLACRILEX 2 MG: 2 LOZENGE ORAL at 17:55

## 2024-07-08 RX ADMIN — NICOTINE POLACRILEX 2 MG: 2 LOZENGE ORAL at 07:10

## 2024-07-08 ASSESSMENT — PAIN - FUNCTIONAL ASSESSMENT: PAIN_FUNCTIONAL_ASSESSMENT: ACTIVITIES ARE NOT PREVENTED

## 2024-07-08 ASSESSMENT — PAIN DESCRIPTION - LOCATION: LOCATION: HEAD

## 2024-07-08 ASSESSMENT — PAIN DESCRIPTION - ORIENTATION: ORIENTATION: MID;RIGHT

## 2024-07-08 ASSESSMENT — PAIN SCALES - GENERAL
PAINLEVEL_OUTOF10: 8
PAINLEVEL_OUTOF10: 0

## 2024-07-08 ASSESSMENT — PAIN DESCRIPTION - DESCRIPTORS: DESCRIPTORS: PRESSURE

## 2024-07-08 NOTE — PROGRESS NOTES
Department of Psychiatry  Attending Progress Note  Chief Complaint: Bipolar Disorder    Patient's chart was reviewed and collaborated with  about the treatment plan.    SUBJECTIVE:    Pt up on unit, pressured speech, impulsive, grandiose.  Pt states he needs to leave today.  I attempted to discuss his behaviors overnight, he denied anything happened, only a miscommunication and I must not of understood.  Pt denies any arguments with peers, slamming doors, threatening to \"break down doors\" and throw chairs.  He requested several PRN injections yesterday after staff were attempting to redirect.  Pt denies there are any issues at home, he feels great and wants to leave.  When I attempted to discuss medications, he was agreeable to oral Abilify, then MCDONOUGH as long as he tolerated.  When I told him he would not be leaving d/t behaviors, he stood up aggressively and became argumentative.  I exited his room and he followed demanding to leave.  Pt has demonstrated through his behaviors that he is not safe, and possible danger to self an others, involuntary hold signed and on chart.      Patient is feeling unchanged. Suicidal ideation:  denies suicidal ideation.  Patient does not have medication side effects.    ROS: Patient has new complaints: no  Sleeping adequately:  Yes   Appetite adequate: Yes  Attending groups: No:   Visitors:No    OBJECTIVE    Physical  VITALS:  /60   Pulse 78   Temp 98.2 °F (36.8 °C) (Oral)   Resp 18   Ht 1.829 m (6' 0.01\")   Wt 96.6 kg (212 lb 15.4 oz)   SpO2 99%   BMI 28.88 kg/m²     Mental Status Examination:  Patients appearance was well-appearing, street clothes, and in chair. Thoughts are Flight of ideas. Homicidal ideations none.  No abnormal movements, tics or mannerisms.  Memory intact Aims 0. Concentration Poor.   Alert and oriented X 4. Insight and Judgement impaired insight. Patient was uncooperative. Patient gait normal. Mood labile, affect labile affect

## 2024-07-08 NOTE — PROGRESS NOTES
Pt states I need more medication for e to sleep. PRN zyprexa given at 2119 but no effect. At 2200 started again to be anxious and states \"I'm not really fine right now, I need another shot\". Informed on call neva and ordered to give haldol 10 mg and benadryl 50 mg. Pt refused to take haldol but accepted benadryl at 2207 and was effective. Seen asleep at 2315. Seen at times. Needs attended.
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
## 2024-07-09 VITALS
BODY MASS INDEX: 28.85 KG/M2 | RESPIRATION RATE: 16 BRPM | WEIGHT: 212.96 LBS | SYSTOLIC BLOOD PRESSURE: 138 MMHG | HEART RATE: 85 BPM | OXYGEN SATURATION: 98 % | HEIGHT: 72 IN | DIASTOLIC BLOOD PRESSURE: 75 MMHG | TEMPERATURE: 98 F

## 2024-07-09 PROCEDURE — 6360000002 HC RX W HCPCS: Performed by: PSYCHIATRY & NEUROLOGY

## 2024-07-09 PROCEDURE — 6370000000 HC RX 637 (ALT 250 FOR IP): Performed by: PSYCHIATRY & NEUROLOGY

## 2024-07-09 PROCEDURE — 6370000000 HC RX 637 (ALT 250 FOR IP)

## 2024-07-09 PROCEDURE — 99239 HOSP IP/OBS DSCHRG MGMT >30: CPT

## 2024-07-09 RX ORDER — ARIPIPRAZOLE 5 MG/1
5 TABLET ORAL DAILY
Qty: 30 TABLET | Refills: 0 | Status: SHIPPED | OUTPATIENT
Start: 2024-07-10 | End: 2024-07-09 | Stop reason: HOSPADM

## 2024-07-09 RX ADMIN — NICOTINE POLACRILEX 2 MG: 2 LOZENGE ORAL at 08:55

## 2024-07-09 RX ADMIN — NICOTINE POLACRILEX 2 MG: 2 LOZENGE ORAL at 00:29

## 2024-07-09 RX ADMIN — LORAZEPAM 2 MG: 2 INJECTION INTRAMUSCULAR; INTRAVENOUS at 09:21

## 2024-07-09 RX ADMIN — ACETAMINOPHEN 650 MG: 325 TABLET ORAL at 10:42

## 2024-07-09 RX ADMIN — ARIPIPRAZOLE 5 MG: 10 TABLET ORAL at 08:37

## 2024-07-09 RX ADMIN — NICOTINE POLACRILEX 2 MG: 2 LOZENGE ORAL at 10:16

## 2024-07-09 RX ADMIN — ARIPIPRAZOLE 25 MG: 15 TABLET ORAL at 11:08

## 2024-07-09 RX ADMIN — NICOTINE POLACRILEX 2 MG: 2 LOZENGE ORAL at 11:08

## 2024-07-09 RX ADMIN — NICOTINE POLACRILEX 2 MG: 2 LOZENGE ORAL at 04:55

## 2024-07-09 RX ADMIN — OLANZAPINE 5 MG: 5 TABLET, FILM COATED ORAL at 10:40

## 2024-07-09 ASSESSMENT — PAIN DESCRIPTION - LOCATION: LOCATION: BACK

## 2024-07-09 ASSESSMENT — PAIN DESCRIPTION - DESCRIPTORS: DESCRIPTORS: ACHING

## 2024-07-09 ASSESSMENT — PAIN - FUNCTIONAL ASSESSMENT: PAIN_FUNCTIONAL_ASSESSMENT: ACTIVITIES ARE NOT PREVENTED

## 2024-07-09 ASSESSMENT — PAIN DESCRIPTION - ORIENTATION: ORIENTATION: LOWER;MID

## 2024-07-09 NOTE — DISCHARGE SUMMARY
Discharge Summary    Admit Date: 7/5/2024   Discharge Date:  7/9/2024 7/9/2024    Final Dx: Bipolar disorder, most recent episode manic (HCC)     At Discharge: 51-60 moderate symptoms   All conditions and active problems were treated while patient was hospitalized.     Condition on DC  Mood and affect are stable and pt is not suicidal     VITALS:  /75   Pulse 85   Temp 98 °F (36.7 °C) (Oral)   Resp 16   Ht 1.829 m (6' 0.01\")   Wt 96.6 kg (212 lb 15.4 oz)   SpO2 98%   BMI 28.88 kg/m²     Brief Summary Present Illness   This is a domiciled, , and psychiatrically disabled 34-year-old with a history of schizoaffective disorder who was brought to our emergency department by family with worsening ray in the setting of treatment nonadherence.  The patient was brought to our emergency department by family with worsening manic symptoms.  In the emergency department, he was pressured, disorganized, and impaired.  Apparently, he was fairly disruptive and required IM medication.  Since admission to the unit, he has been pressured, elevated, and intrusive.  He insists that although he is hyper and irritable he is not experiencing a manic episode.  He hopes that we can contact his parents to convince them he is not manic.     The patient reports that after being placed on Invega Sustenna and discharged from here in August 2022, he gained weight and so stopped taking it.  He reports being in [place] from November to January of this year in various mental health and substance use treatment facilities.  Says he moved back to [facility] on May 1 and is being followed by [facility].  He says that they gave him clonidine and Zyprexa as needed, but that he has not been taking them.  He is adamant about not starting another scheduled psychotropic.    Pt was treated and stabilized on BHI.  Pt was disruptive early in his admission, required being moved to the small side of the unit, and several PRN

## 2024-07-09 NOTE — PROGRESS NOTES
Norman has been visible and social. His mood is bright. His speech is pressured with flight of ideas and grandiosity. 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 He denies SI/HI/AVH. He is focused on wanting to be discharged. He requested PRN Ativan 2 mg IM @ 1954 as well as Tylenol for a HA. PRNs effective. Pt rested well until 2245 when RN was doing rounds and he was startled by the door opening. He jumped out of bed and began yelling and screaming, with his fist raised. He did not remember episode shortly thereafter. He has otherwise been pleasant with no behavioral issues. Will continue to monitor overnight.

## 2024-07-09 NOTE — BH NOTE
8239  Pt did sleep for a while and then began talking about not feeling well and wanting to go home to his family. Pt is not really agitated at this time but definitely frustrated. Wants to see a provider when they are here.   
Ativan given IM due to agitation, door slamming became agitated during phone call, cursing and name calling.   
Bridge Appointment completed: Reviewed Discharge Instructions with patient.    Patient verbalizes understanding and agreement with the discharge plan using the teachback method.         Vaccinations (benedicto X if applicable and completed):  ( ) Patient states already received influenza vaccine elsewhere  ( ) Patient received influenza vaccine during this hospitalization  ( ) Patient refused influenza vaccine at this time  ( x) Not offered   
Geodon ordered upon patient request for agitation. Patient stated feels like nothing else is working.   After moving to small side patient was making verbal threats \"you can't make me stay over here I want back over there, I'll bust these fucking doors down - go ahead and call the  because I'm going to do it\".     
Norman arrived to the small side room 4, protesting loudly. He remained very argumentative with various threats to leave, kirt, etc. Pt initially refused to put his belongings in the assigned room, instead, throwing them about the unit, refusing to go into his newly assigned room. Pt calmed once security entered the unit and he eventually sat at a table w/ male peer to try and play cards. This lasted only a brief time before pt received IM Geodon that he had requested earlier. By 2:30 he was noted to be resting quietly with eyes closed in his bed.  Will continue to monitor.  
Norman's mother called in to give collateral. She verbalizes her and his father are interested in getting Norman community probated. She expresses he has been banned from a place of business in [facility] due to his bizarre and agitated behavior.   
Pt c/o anxiety 5/10 with 10 being the worst. He stated he wanted Lorazepam IM. Pt educated regarding the need to take PO medications, as he will not be taking IM medications on discharge. He is requesting to leave today, stating he is feeling much better. Provider notified.  
Shashi mother called and was upset that he is leaving today. She does not feel that he is ready. She is educated regarding the long acting Abilify and the upcoming doses. Norman is educated regarding taking his medications as ordered, going to therapy as ordered and keeping his appointments with [facility] and his PCP. He agrees to do all of the above and is able to teach back medication instructions. Tylenol and Zyprexa for c/o back pain and anxiety. Will monitor effect.  
Staff member witnessed Norman and a female patient kissing while in the hallway.   Norman moved to small size to minimize interaction with female involved.   
Unable to verify medications due to patient's pharmacy is closed on Saturdays.   
triggers and roadblocks)                    ( ) Coping skills (new ways to manage stress,relaxation techniques, changing routine, distraction)                                                           ( ) Basic information about quitting (benefits of quitting, techniques in how to quit, available resources  ( ) [facility]                                                                                                                   (x ) Patient refused counseling  ( x) Patient refused referral  ( x) Patient refused prescription upon discharge  ( ) Patient has not smoked in the last 30 days    Metabolic Screening:    Lab Results   Component Value Date    LABA1C 4.9 05/29/2024       Lab Results   Component Value Date    CHOL 238 (H) 05/29/2024    CHOL 216 (H) 04/25/2022    CHOL 176 03/03/2022    CHOL 170 01/09/2022     Lab Results   Component Value Date    TRIG 104 05/29/2024    TRIG 139 04/25/2022    TRIG 114 03/03/2022    TRIG 72 01/09/2022     Lab Results   Component Value Date    HDL 66 (H) 05/29/2024    HDL 56 04/25/2022    HDL 64 (H) 03/03/2022    HDL 63 (H) 01/09/2022     No components found for: \"LDLCAL\"  No components found for: \"LABVLDL\"    Alison Gutierrez RN

## 2024-07-09 NOTE — PROGRESS NOTES
Pt again yelled out in his sleep. Appeared to be having a nightmare. Did not get out of bed this time. Appeared to stay asleep.

## 2024-07-09 NOTE — PLAN OF CARE
Requested by nursing staff to see tomorrow     Will attempt H & P tomorrow     WILFRED Simmons  07/06/24  3:21 PM    
  Problem: Anxiety  Goal: Will report anxiety at manageable levels  Description: INTERVENTIONS:  1. Administer medication as ordered  2. Teach and rehearse alternative coping skills  3. Provide emotional support with 1:1 interaction with staff  7/7/2024 1521 by Joselyn Ludwig RN  Outcome: Progressing  Flowsheets (Taken 7/7/2024 1459)  Will report anxiety at manageable levels:   Administer medication as ordered   Teach and rehearse alternative coping skills   Provide emotional support with 1:1 interaction with staff  7/7/2024 0438 by Keshav Valdez RN  Outcome: Progressing     Problem: Coping  Goal: Pt/Family able to verbalize concerns and demonstrate effective coping strategies  Description: INTERVENTIONS:  1. Assist patient/family to identify coping skills, available support systems and cultural and spiritual values  2. Provide emotional support, including active listening and acknowledgement of concerns of patient and caregivers  3. Reduce environmental stimuli, as able  4. Instruct patient/family in relaxation techniques, as appropriate  5. Assess for spiritual pain/suffering and initiate Spiritual Care, Psychosocial Clinical Specialist consults as needed  Outcome: Progressing  Flowsheets (Taken 7/7/2024 1459)  Patient/family able to verbalize anxieties, fears, and concerns, and demonstrate effective coping: Reduce environmental stimuli, as able     Problem: Kayli  Goal: Will exhibit normal sleep and speech and no impulsivity  Description: INTERVENTIONS:  1. Administer medication as ordered  2. Set limits on impulsive behavior  3. Make attempts to decrease external stimuli as possible  7/7/2024 1521 by Joselyn Ludwig RN  Outcome: Progressing  7/7/2024 0438 by Keshav Valdez RN  Outcome: Progressing     Problem: Psychosis  Goal: Will report no hallucinations or delusions  Description: INTERVENTIONS:  1. Administer medication as  ordered  2. Assist with reality testing to support increasing orientation  3. 
  Problem: Anxiety  Goal: Will report anxiety at manageable levels  Description: INTERVENTIONS:  1. Administer medication as ordered  2. Teach and rehearse alternative coping skills  3. Provide emotional support with 1:1 interaction with staff  Outcome: Progressing  Flowsheets (Taken 7/8/2024 1011 by Belkis Sykes, RN)  Will report anxiety at manageable levels:   Administer medication as ordered   Teach and rehearse alternative coping skills   Provide emotional support with 1:1 interaction with staff     Problem: Coping  Goal: Pt/Family able to verbalize concerns and demonstrate effective coping strategies  Description: INTERVENTIONS:  1. Assist patient/family to identify coping skills, available support systems and cultural and spiritual values  2. Provide emotional support, including active listening and acknowledgement of concerns of patient and caregivers  3. Reduce environmental stimuli, as able  4. Instruct patient/family in relaxation techniques, as appropriate  5. Assess for spiritual pain/suffering and initiate Spiritual Care, Psychosocial Clinical Specialist consults as needed  7/8/2024 2137 by Alicia Love RN  Outcome: Progressing     Problem: Behavior  Goal: Pt/Family maintain appropriate behavior and adhere to behavioral management agreement, if implemented  Description: INTERVENTIONS:  1. Assess patient/family's coping skills and  non-compliant behavior (including use of illegal substances)  2. Notify security of behavior or suspected illegal substances which indicate the need for search of the family and/or belongings  3. Encourage verbalization of thoughts and concerns in a socially appropriate manner  4. Utilize positive, consistent limit setting strategies supporting safety of patient, staff and others  5. Encourage participation in the decision making process about the behavioral management agreement  6. If a visitor's behavior poses a threat to safety call refer to organization policy.  7. 
  Problem: Kayli  Goal: Will exhibit normal sleep and speech and no impulsivity  Description: INTERVENTIONS:  1. Administer medication as ordered  2. Set limits on impulsive behavior  3. Make attempts to decrease external stimuli as possible  7/6/2024 1149 by Joselyn Ludwig RN  Outcome: Not Progressing  7/6/2024 0237 by Harley Ku RN  Outcome: Not Progressing     Problem: Anxiety  Goal: Will report anxiety at manageable levels  Description: INTERVENTIONS:  1. Administer medication as ordered  2. Teach and rehearse alternative coping skills  3. Provide emotional support with 1:1 interaction with staff  7/6/2024 1149 by Joselyn Ludwig RN  Outcome: Progressing  Flowsheets (Taken 7/6/2024 1133)  Will report anxiety at manageable levels:   Administer medication as ordered   Teach and rehearse alternative coping skills  7/6/2024 0238 by Harley Ku RN  Flowsheets (Taken 7/6/2024 0238)  Will report anxiety at manageable levels:   Administer medication as ordered   Teach and rehearse alternative coping skills   Provide emotional support with 1:1 interaction with staff  7/6/2024 0237 by Harley Ku RN  Outcome: Not Progressing     Problem: Coping  Goal: Pt/Family able to verbalize concerns and demonstrate effective coping strategies  Description: INTERVENTIONS:  1. Assist patient/family to identify coping skills, available support systems and cultural and spiritual values  2. Provide emotional support, including active listening and acknowledgement of concerns of patient and caregivers  3. Reduce environmental stimuli, as able  4. Instruct patient/family in relaxation techniques, as appropriate  5. Assess for spiritual pain/suffering and initiate Spiritual Care, Psychosocial Clinical Specialist consults as needed  7/6/2024 1149 by Joselyn Ludwig RN  Outcome: Progressing  Flowsheets (Taken 7/6/2024 1133)  Patient/family able to verbalize anxieties, fears, and concerns, and demonstrate effective coping: Reduce environmental 
  Problem: Kayli  Goal: Will exhibit normal sleep and speech and no impulsivity  Description: INTERVENTIONS:  1. Administer medication as ordered  2. Set limits on impulsive behavior  3. Make attempts to decrease external stimuli as possible  Outcome: Progressing     Problem: Psychosis  Goal: Will report no hallucinations or delusions  Description: INTERVENTIONS:  1. Administer medication as  ordered  2. Assist with reality testing to support increasing orientation  3. Assess if patient's hallucinations or delusions are encouraging self harm or harm to others and intervene as appropriate  Outcome: Progressing     Problem: Behavior  Goal: Pt/Family maintain appropriate behavior and adhere to behavioral management agreement, if implemented  Description: INTERVENTIONS:  1. Assess patient/family's coping skills and  non-compliant behavior (including use of illegal substances)  2. Notify security of behavior or suspected illegal substances which indicate the need for search of the family and/or belongings  3. Encourage verbalization of thoughts and concerns in a socially appropriate manner  4. Utilize positive, consistent limit setting strategies supporting safety of patient, staff and others  5. Encourage participation in the decision making process about the behavioral management agreement  6. If a visitor's behavior poses a threat to safety call refer to organization policy.  7. Initiate consult with , Psychosocial CNS, Spiritual Care as appropriate  Outcome: Not Progressing   Pt denies SI and denies psychotic sx. Pt seems s/w paranoid and may be having some persecutory delusions.   Pt slept almost the entire shift until about 0230.  He then said he had a HA and when asked pointed to his lower head above the neck. Did not rate for amount of pain. Refused any medication. Just wants to leave. Told him he would have to be discharged by his doctor. States just needed some rest and now he's ready to leave.  
Norman has been alert and oriented today and social with his peers. He has been cooperative with care and medications. He has complained of anxiety and a headache and was medicated with Lorazepam, Zyprexa and Tylenol. All were effective. He states he has not been having AVH and is not noted to be responding to internal stimuli. He denies SI/HI. He verbalized understanding of his discharge notes and agrees to keep appointments and take his medications as ordered. P/U per Becki at 1315 to go home to [place].  
delusions  7/6/2024 0238 by Harley Ku, RN  Outcome: Not Progressing  7/6/2024 0237 by Harley Ku, RN  Outcome: Not Progressing     Problem: Drug Abuse/Detox  Goal: Will have no detox symptoms and will verbalize plan for changing drug-related behavior  7/6/2024 0238 by Harley Ku, RN  Outcome: Not Progressing  Flowsheets (Taken 7/6/2024 0238)  Will have no detox symptoms and will verbalize plan for changing drug-related behavior:   Administer medication as ordered   Encourage recovery focused treatment   Provide emotional support with 1:1 interaction with staff       
ordered   Teach and rehearse alternative coping skills   Provide emotional support with 1:1 interaction with staff  7/8/2024 0131 by Harley Ku RN  Outcome: Not Progressing  Flowsheets (Taken 7/8/2024 0131)  Will report anxiety at manageable levels:   Administer medication as ordered   Provide emotional support with 1:1 interaction with staff   Teach and rehearse alternative coping skills     Problem: Coping  Goal: Pt/Family able to verbalize concerns and demonstrate effective coping strategies  Description: INTERVENTIONS:  1. Assist patient/family to identify coping skills, available support systems and cultural and spiritual values  2. Provide emotional support, including active listening and acknowledgement of concerns of patient and caregivers  3. Reduce environmental stimuli, as able  4. Instruct patient/family in relaxation techniques, as appropriate  5. Assess for spiritual pain/suffering and initiate Spiritual Care, Psychosocial Clinical Specialist consults as needed  Outcome: Not Progressing  Flowsheets (Taken 7/8/2024 1011)  Patient/family able to verbalize anxieties, fears, and concerns, and demonstrate effective coping: Reduce environmental stimuli, as able     Problem: Behavior  Goal: Pt/Family maintain appropriate behavior and adhere to behavioral management agreement, if implemented  Description: INTERVENTIONS:  1. Assess patient/family's coping skills and  non-compliant behavior (including use of illegal substances)  2. Notify security of behavior or suspected illegal substances which indicate the need for search of the family and/or belongings  3. Encourage verbalization of thoughts and concerns in a socially appropriate manner  4. Utilize positive, consistent limit setting strategies supporting safety of patient, staff and others  5. Encourage participation in the decision making process about the behavioral management agreement  6. If a visitor's behavior poses a threat to safety call refer to 
visitor’s behavior poses a threat to safety refer to organization policy   Implement a Health Care Agreement if patient meets criteria   Initiate consult with , Psychosocial Clinical Nurse Specialist, Spiritual Care as appropriate  7/7/2024 1521 by Joselyn Ludwig, RN  Outcome: Progressing     Problem: Kayli  Goal: Will exhibit normal sleep and speech and no impulsivity  7/8/2024 0131 by Harley Ku RN  Outcome: Not Progressing  7/7/2024 1521 by Joselyn Ludwig RN  Outcome: Progressing     Problem: Psychosis  Goal: Will report no hallucinations or delusions  7/8/2024 0131 by Harley Ku RN  Outcome: Not Progressing  7/7/2024 1521 by Joselyn Ludwig RN  Outcome: Progressing

## 2024-07-09 NOTE — TRANSITION OF CARE
Behavioral Health Transition Record    Patient Name: Norman Garcia  YOB: 1990   Medical Record Number: [id number]  Date of Admission: 7/5/2024  2:41 PM   Date of Discharge: 7/9/24    Attending Provider: Pramod Calvert MD   Discharging Provider:    Aysha Garza APRN - CNP     To contact this individual call [phone] and ask the  to page.  If unavailable, ask to be transferred to Behavioral Health Provider on call.  A Behavioral Health Provider will be available on call 24/7 and during holidays.    Primary Care Provider: Amanda Aggarwal APRN - CNP    Allergies   Allergen Reactions    Gabapentin     Seroquel [Quetiapine] Itching and Other (See Comments)     Restless leg    Trazodone And Nefazodone        Reason for Admission: The patient is a 34 y.o. male with pmhx of anxiety, bipolar disorder, migraines who presented to [facility] for concerns for manic behavior.  Patient was seen and evaluated in the ED by the ED medical provider, patient was medically cleared for admission to [facility].  This note serves as an admission medical H&P.     The patient was brought to our emergency department by family with worsening manic symptoms.  In the emergency department, he was pressured, disorganized, and impaired.  Apparently, he was fairly disruptive and required IM medication.     Since admission to the unit, he has been pressured, elevated, and intrusive.  He insists that although he is hyper and irritable he is not experiencing a manic episode.  He hopes that we can contact his parents to convince them he is not manic.     The patient reports that after being placed on Invega Sustenna and discharged from here in August 2022, he gained weight and so stopped taking it.  He reports being in [place] from November to January of this year in various mental health and substance use treatment facilities.  Says he moved back to [facility] on May 1 and is being followed by

## 2024-07-10 ENCOUNTER — TELEPHONE (OUTPATIENT)
Dept: FAMILY MEDICINE CLINIC | Age: 34
End: 2024-07-10

## 2024-07-10 NOTE — TELEPHONE ENCOUNTER
I am not able to tell what injections he was given during his hospital stay.  If the patient is still having lightheadedness and dizziness or if he is having any chest tightness then he should report to the ER  Patient should monitor his blood pressure every day and bring in those readings for review at his appointment

## 2024-07-10 NOTE — TELEPHONE ENCOUNTER
Care Transitions Initial Follow Up Call    Outreach made within 2 business days of discharge: Yes    Patient: Norman Garcia Patient : 1990   MRN: [id number]  Reason for Admission: There are no discharge diagnoses documented for the most recent discharge.  Discharge Date: 24       Spoke with: Patient     Discharge department/facility: [facility] Interactive Patient Contact:  Was patient able to fill all prescriptions: Yes  Was patient instructed to bring all medications to the follow-up visit: Yes  Is patient taking all medications as directed in the discharge summary? Yes  Does patient understand their discharge instructions: Yes  Does patient have questions or concerns that need addressed prior to 7-14 day follow up office visit: yes - See 2nd note     Scheduled appointment with PCP within 7-14 days    Follow Up  Future Appointments   Date Time Provider Department Center   2024 11:20 AM Amanda [facility] - [facility]   2024  1:20 PM Amanda Aggarwal APRN - CNP [place] [facility]   2024  2:15 PM Rob Jernigan MD PLASTICS/REC [facility]   10/10/2024  1:20 PM Aj Dave MD [facility] NEURO Neurology -       Lilia Whitt

## 2024-07-10 NOTE — TELEPHONE ENCOUNTER
Patient stated he had a near syncope episode but thinks it was related to his recent hospital episode where he \"got 8-9 ativan and  1 shot of Haldol\", he was discharged yesterday     Patient stated he thought he was going to faint last night but it went a away within a few minutes and then happened again this  morning.     Right side of head is hurting still - under his eye lids in his temporal area     He doesn't want to go to the hospital for this but wanted to let you know and if you had any other recommendations     Patient had a little chest tightness and knows it was related to stress - he has emergency visit today with [facility]     Wanted to know do you think it could  be related to the medication leaving his system or could this be long term effects of Covid     Patient did check his BP today 139/89

## 2024-07-11 ENCOUNTER — TELEPHONE (OUTPATIENT)
Dept: FAMILY MEDICINE CLINIC | Age: 34
End: 2024-07-11

## 2024-07-11 DIAGNOSIS — F51.01 PRIMARY INSOMNIA: Primary | ICD-10-CM

## 2024-07-11 NOTE — TELEPHONE ENCOUNTER
Pt called and he stated that he was wanting to see if he could get a sleep study and get his brain waves checked also.

## 2024-07-15 ENCOUNTER — TELEPHONE (OUTPATIENT)
Dept: FAMILY MEDICINE CLINIC | Age: 34
End: 2024-07-15

## 2024-07-15 NOTE — TELEPHONE ENCOUNTER
Pt's mother called and she was wanting to let you know that pt is in the hospital at [facility]. She said that they specialize in psych patients, they took him to [facility] and they transferred him there.

## 2024-07-22 DIAGNOSIS — M54.32 SCIATICA OF LEFT SIDE: ICD-10-CM

## 2024-07-22 RX ORDER — NAPROXEN 500 MG/1
500 TABLET ORAL 2 TIMES DAILY WITH MEALS
Qty: 60 TABLET | Refills: 0 | Status: SHIPPED | OUTPATIENT
Start: 2024-07-22

## 2024-07-22 NOTE — TELEPHONE ENCOUNTER
Refill Request     CONFIRM preferrred pharmacy with the patient.    If Mail Order Rx - Pend for 90 day refill.      Last Seen: Last Seen Department: 6/27/2024  Last Seen by PCP: 6/27/2024    Last Written: 6/24/24    If no future appointment scheduled, route STAFF MESSAGE with patient name to the  Pool for scheduling.      Next Appointment:   Future Appointments   Date Time Provider Department Center   7/26/2024  1:20 PM Amanda Aggarwal APRN - CNP [place]  [place] [facility]   7/29/2024  2:15 PM Rob Jernigan MD PLASTICS/REC [facility]   8/30/2024  2:40 PM Rubi Jose APRN - CNP CLERM PULM [facility]   10/10/2024  1:20 PM Aj Dave MD [facility] NEURO Neurology -       Message sent to  to schedule appt with patient?  N/A      Requested Prescriptions     Pending Prescriptions Disp Refills    naproxen (NAPROSYN) 500 MG tablet [Pharmacy Med Name: Naproxen 500 MG Oral Tablet] 60 tablet 0     Sig: TAKE 1 TABLET BY MOUTH TWICE DAILY WITH MEALS

## 2024-07-29 ENCOUNTER — TELEPHONE (OUTPATIENT)
Dept: SURGERY | Age: 34
End: 2024-07-29

## 2024-07-29 NOTE — TELEPHONE ENCOUNTER
Spoke with patient explained pricing he voiced he wouldn't be able to pay but thanked me for the info.

## 2024-07-29 NOTE — TELEPHONE ENCOUNTER
Patient was scheduled for a New Patient / Excess skin / Referred By [facility], Referral Is In Epic - 07/29/2024.    Patient was informed the office does not accept his insurance. Patient would like to know how much the procedure would cost out of pocket.     Patient can be reached at [phone].

## 2024-08-01 ENCOUNTER — TELEPHONE (OUTPATIENT)
Dept: FAMILY MEDICINE CLINIC | Age: 34
End: 2024-08-01

## 2024-08-01 NOTE — TELEPHONE ENCOUNTER
I have told the patient multiple times that I do not prescribe Sudafed.    If he would like Sudafed he will have to get it on his own at the pharmacy  With the patient's history I do not recommend that he use Sudafed

## 2024-08-01 NOTE — TELEPHONE ENCOUNTER
Patient calling to see if he can take pseudofed for his headache. States he has not been able to get the sleep study done yet and he is moving in 2 weeks so he's not sure when he will be able to get it done. States he has a horrible headache and took excedrine and it made him feel like he was on meth. States he would typically smoke marijuana for them which helped but states he isnt able to do that anymore and would like to know if he can take a pseudofed to help with the headache?   VSS and febrile. MSSA =5. Calm and cooperative.    Voiding urine w/o difficulty. Had formed BM today.  Up independently and ambulated. No postural dizziness.   RLE dressing : + scant dried drainage .  CMS and Neuros intact. Positive wound cx, on PO  Cleocin 300mg  q 6hrs  Pt reports eye infection is getting better with eye ointment.  Good appetite and eating well. No n/v, relfux, abd pain, Cp or SOB  Off tele, IV SL. Good PO fluid intake. Mother staying in room.  Pt wants to discharge home tomorrow.    Plan :  [facility] treatment center on Wed

## 2024-08-02 ENCOUNTER — APPOINTMENT (OUTPATIENT)
Dept: GENERAL RADIOLOGY | Age: 34
End: 2024-08-02
Payer: MEDICAID

## 2024-08-02 ENCOUNTER — HOSPITAL ENCOUNTER (INPATIENT)
Age: 34
LOS: 7 days | Discharge: HOME | End: 2024-08-09
Attending: EMERGENCY MEDICINE | Admitting: PSYCHIATRY & NEUROLOGY
Payer: MEDICAID

## 2024-08-02 DIAGNOSIS — R45.851 SUICIDAL IDEATION: Primary | ICD-10-CM

## 2024-08-02 PROBLEM — F31.10 BIPOLAR I DISORDER, MOST RECENT EPISODE (OR CURRENT) MANIC (HCC): Status: ACTIVE | Noted: 2024-08-02

## 2024-08-02 LAB
AMPHETAMINES UR QL SCN>1000 NG/ML: ABNORMAL
ANION GAP SERPL CALCULATED.3IONS-SCNC: 8 MMOL/L (ref 3–16)
APAP SERPL-MCNC: <5 UG/ML (ref 10–30)
BARBITURATES UR QL SCN>200 NG/ML: ABNORMAL
BASOPHILS # BLD: 0.1 K/UL (ref 0–0.2)
BASOPHILS NFR BLD: 1.2 %
BENZODIAZ UR QL SCN>200 NG/ML: ABNORMAL
BUN SERPL-MCNC: 11 MG/DL (ref 7–20)
CALCIUM SERPL-MCNC: 9.1 MG/DL (ref 8.3–10.6)
CANNABINOIDS UR QL SCN>50 NG/ML: POSITIVE
CHLORIDE SERPL-SCNC: 101 MMOL/L (ref 99–110)
CO2 SERPL-SCNC: 27 MMOL/L (ref 21–32)
COCAINE UR QL SCN: ABNORMAL
CREAT SERPL-MCNC: 0.6 MG/DL (ref 0.9–1.3)
DEPRECATED RDW RBC AUTO: 12.2 % (ref 12.4–15.4)
DRUG SCREEN COMMENT UR-IMP: ABNORMAL
EOSINOPHIL # BLD: 0.1 K/UL (ref 0–0.6)
EOSINOPHIL NFR BLD: 1.8 %
ETHANOLAMINE SERPL-MCNC: NORMAL MG/DL (ref 0–0.08)
FENTANYL SCREEN, URINE: ABNORMAL
GFR SERPLBLD CREATININE-BSD FMLA CKD-EPI: >90 ML/MIN/{1.73_M2}
GLUCOSE SERPL-MCNC: 115 MG/DL (ref 70–99)
HCT VFR BLD AUTO: 38.3 % (ref 40.5–52.5)
HGB BLD-MCNC: 13.1 G/DL (ref 13.5–17.5)
LYMPHOCYTES # BLD: 2 K/UL (ref 1–5.1)
LYMPHOCYTES NFR BLD: 24.1 %
MCH RBC QN AUTO: 30.7 PG (ref 26–34)
MCHC RBC AUTO-ENTMCNC: 34.3 G/DL (ref 31–36)
MCV RBC AUTO: 89.5 FL (ref 80–100)
METHADONE UR QL SCN>300 NG/ML: ABNORMAL
MONOCYTES # BLD: 0.5 K/UL (ref 0–1.3)
MONOCYTES NFR BLD: 6.3 %
NEUTROPHILS # BLD: 5.6 K/UL (ref 1.7–7.7)
NEUTROPHILS NFR BLD: 66.6 %
OPIATES UR QL SCN>300 NG/ML: ABNORMAL
OXYCODONE UR QL SCN: ABNORMAL
PCP UR QL SCN>25 NG/ML: ABNORMAL
PH UR STRIP: 6.5 [PH]
PLATELET # BLD AUTO: 246 K/UL (ref 135–450)
PMV BLD AUTO: 7.5 FL (ref 5–10.5)
POTASSIUM SERPL-SCNC: 3.9 MMOL/L (ref 3.5–5.1)
RBC # BLD AUTO: 4.28 M/UL (ref 4.2–5.9)
SALICYLATES SERPL-MCNC: <0.3 MG/DL (ref 15–30)
SODIUM SERPL-SCNC: 136 MMOL/L (ref 136–145)
WBC # BLD AUTO: 8.4 K/UL (ref 4–11)

## 2024-08-02 PROCEDURE — 6370000000 HC RX 637 (ALT 250 FOR IP): Performed by: PSYCHIATRY & NEUROLOGY

## 2024-08-02 PROCEDURE — 80307 DRUG TEST PRSMV CHEM ANLYZR: CPT

## 2024-08-02 PROCEDURE — 6370000000 HC RX 637 (ALT 250 FOR IP): Performed by: EMERGENCY MEDICINE

## 2024-08-02 PROCEDURE — 99285 EMERGENCY DEPT VISIT HI MDM: CPT

## 2024-08-02 PROCEDURE — 82077 ASSAY SPEC XCP UR&BREATH IA: CPT

## 2024-08-02 PROCEDURE — 80048 BASIC METABOLIC PNL TOTAL CA: CPT

## 2024-08-02 PROCEDURE — 85025 COMPLETE CBC W/AUTO DIFF WBC: CPT

## 2024-08-02 PROCEDURE — 6360000002 HC RX W HCPCS: Performed by: PSYCHIATRY & NEUROLOGY

## 2024-08-02 PROCEDURE — 80179 DRUG ASSAY SALICYLATE: CPT

## 2024-08-02 PROCEDURE — 96372 THER/PROPH/DIAG INJ SC/IM: CPT

## 2024-08-02 PROCEDURE — 2580000003 HC RX 258

## 2024-08-02 PROCEDURE — 73110 X-RAY EXAM OF WRIST: CPT

## 2024-08-02 PROCEDURE — 80143 DRUG ASSAY ACETAMINOPHEN: CPT

## 2024-08-02 PROCEDURE — 6360000002 HC RX W HCPCS: Performed by: EMERGENCY MEDICINE

## 2024-08-02 PROCEDURE — 73130 X-RAY EXAM OF HAND: CPT

## 2024-08-02 PROCEDURE — 1240000000 HC EMOTIONAL WELLNESS R&B

## 2024-08-02 RX ORDER — LORAZEPAM 2 MG/ML
2 INJECTION INTRAMUSCULAR ONCE
Status: COMPLETED | OUTPATIENT
Start: 2024-08-02 | End: 2024-08-02

## 2024-08-02 RX ORDER — LORAZEPAM 2 MG/1
2 TABLET ORAL 2 TIMES DAILY PRN
Status: DISCONTINUED | OUTPATIENT
Start: 2024-08-02 | End: 2024-08-02

## 2024-08-02 RX ORDER — ACETAMINOPHEN 325 MG/1
650 TABLET ORAL EVERY 8 HOURS PRN
Status: DISCONTINUED | OUTPATIENT
Start: 2024-08-02 | End: 2024-08-03

## 2024-08-02 RX ORDER — OLANZAPINE 10 MG/1
10 TABLET ORAL 3 TIMES DAILY PRN
Status: DISCONTINUED | OUTPATIENT
Start: 2024-08-02 | End: 2024-08-03

## 2024-08-02 RX ORDER — CLONIDINE HYDROCHLORIDE 0.2 MG/1
0.2 TABLET ORAL NIGHTLY
Status: ON HOLD | COMMUNITY
Start: 2024-07-12 | End: 2024-08-08

## 2024-08-02 RX ORDER — POLYETHYLENE GLYCOL 3350 17 G
2 POWDER IN PACKET (EA) ORAL
Status: DISCONTINUED | OUTPATIENT
Start: 2024-08-02 | End: 2024-08-02 | Stop reason: SDUPTHER

## 2024-08-02 RX ORDER — POLYETHYLENE GLYCOL 3350 17 G
2 POWDER IN PACKET (EA) ORAL
Status: DISCONTINUED | OUTPATIENT
Start: 2024-08-02 | End: 2024-08-09 | Stop reason: HOSPADM

## 2024-08-02 RX ORDER — IBUPROFEN 600 MG/1
600 TABLET ORAL ONCE
Status: COMPLETED | OUTPATIENT
Start: 2024-08-02 | End: 2024-08-02

## 2024-08-02 RX ORDER — NICOTINE 21 MG/24HR
1 PATCH, TRANSDERMAL 24 HOURS TRANSDERMAL DAILY
Status: DISCONTINUED | OUTPATIENT
Start: 2024-08-02 | End: 2024-08-09 | Stop reason: HOSPADM

## 2024-08-02 RX ORDER — WATER 10 ML/10ML
INJECTION INTRAMUSCULAR; INTRAVENOUS; SUBCUTANEOUS
Status: COMPLETED
Start: 2024-08-02 | End: 2024-08-02

## 2024-08-02 RX ORDER — ZIPRASIDONE MESYLATE 20 MG/ML
20 INJECTION, POWDER, LYOPHILIZED, FOR SOLUTION INTRAMUSCULAR ONCE
Status: COMPLETED | OUTPATIENT
Start: 2024-08-02 | End: 2024-08-02

## 2024-08-02 RX ORDER — OLANZAPINE 10 MG/2ML
10 INJECTION, POWDER, FOR SOLUTION INTRAMUSCULAR 2 TIMES DAILY PRN
Status: DISCONTINUED | OUTPATIENT
Start: 2024-08-02 | End: 2024-08-03

## 2024-08-02 RX ORDER — ONDANSETRON 4 MG/1
4 TABLET, ORALLY DISINTEGRATING ORAL EVERY 8 HOURS PRN
Status: DISCONTINUED | OUTPATIENT
Start: 2024-08-02 | End: 2024-08-09 | Stop reason: HOSPADM

## 2024-08-02 RX ORDER — OLANZAPINE 5 MG/1
5 TABLET, ORALLY DISINTEGRATING ORAL 2 TIMES DAILY
Status: ON HOLD | COMMUNITY
Start: 2024-04-28 | End: 2024-08-08

## 2024-08-02 RX ORDER — DIAZEPAM 5 MG/1
5 TABLET ORAL 2 TIMES DAILY
Status: ON HOLD | COMMUNITY
End: 2024-08-08

## 2024-08-02 RX ORDER — LORAZEPAM 2 MG/1
2 TABLET ORAL 3 TIMES DAILY PRN
Status: DISCONTINUED | OUTPATIENT
Start: 2024-08-02 | End: 2024-08-03

## 2024-08-02 RX ORDER — LITHIUM CARBONATE 450 MG
450 TABLET, EXTENDED RELEASE ORAL EVERY 12 HOURS SCHEDULED
Status: DISCONTINUED | OUTPATIENT
Start: 2024-08-02 | End: 2024-08-09 | Stop reason: HOSPADM

## 2024-08-02 RX ADMIN — NICOTINE POLACRILEX 2 MG: 2 LOZENGE ORAL at 22:30

## 2024-08-02 RX ADMIN — LITHIUM CARBONATE 450 MG: 450 TABLET ORAL at 21:43

## 2024-08-02 RX ADMIN — IBUPROFEN 600 MG: 600 TABLET, FILM COATED ORAL at 13:15

## 2024-08-02 RX ADMIN — NICOTINE POLACRILEX 2 MG: 2 LOZENGE ORAL at 12:12

## 2024-08-02 RX ADMIN — LORAZEPAM 2 MG: 2 INJECTION INTRAMUSCULAR; INTRAVENOUS at 21:43

## 2024-08-02 RX ADMIN — ACETAMINOPHEN 650 MG: 325 TABLET ORAL at 22:30

## 2024-08-02 RX ADMIN — NICOTINE POLACRILEX 2 MG: 2 LOZENGE ORAL at 13:15

## 2024-08-02 RX ADMIN — WATER 1.2 ML: 1 INJECTION INTRAMUSCULAR; INTRAVENOUS; SUBCUTANEOUS at 12:06

## 2024-08-02 RX ADMIN — NICOTINE POLACRILEX 2 MG: 2 LOZENGE ORAL at 15:36

## 2024-08-02 RX ADMIN — LORAZEPAM 2 MG: 2 TABLET ORAL at 17:37

## 2024-08-02 RX ADMIN — ZIPRASIDONE MESYLATE 20 MG: 20 INJECTION, POWDER, LYOPHILIZED, FOR SOLUTION INTRAMUSCULAR at 12:06

## 2024-08-02 RX ADMIN — NICOTINE POLACRILEX 2 MG: 2 LOZENGE ORAL at 17:29

## 2024-08-02 RX ADMIN — LORAZEPAM 2 MG: 2 INJECTION INTRAMUSCULAR; INTRAVENOUS at 12:04

## 2024-08-02 RX ADMIN — ONDANSETRON 4 MG: 4 TABLET, ORALLY DISINTEGRATING ORAL at 22:30

## 2024-08-02 ASSESSMENT — PATIENT HEALTH QUESTIONNAIRE - PHQ9
2. FEELING DOWN, DEPRESSED OR HOPELESS: SEVERAL DAYS
1. LITTLE INTEREST OR PLEASURE IN DOING THINGS: NOT AT ALL
SUM OF ALL RESPONSES TO PHQ QUESTIONS 1-9: 1
SUM OF ALL RESPONSES TO PHQ9 QUESTIONS 1 & 2: 1

## 2024-08-02 ASSESSMENT — PAIN DESCRIPTION - LOCATION: LOCATION: HEAD;HAND

## 2024-08-02 ASSESSMENT — PAIN DESCRIPTION - PAIN TYPE: TYPE: ACUTE PAIN

## 2024-08-02 ASSESSMENT — SLEEP AND FATIGUE QUESTIONNAIRES
DO YOU HAVE DIFFICULTY SLEEPING: YES
AVERAGE NUMBER OF SLEEP HOURS: 4
SLEEP PATTERN: DIFFICULTY FALLING ASLEEP;DISTURBED/INTERRUPTED SLEEP
DO YOU USE A SLEEP AID: YES

## 2024-08-02 ASSESSMENT — LIFESTYLE VARIABLES
HOW MANY STANDARD DRINKS CONTAINING ALCOHOL DO YOU HAVE ON A TYPICAL DAY: 1 OR 2
HOW OFTEN DO YOU HAVE A DRINK CONTAINING ALCOHOL: MONTHLY OR LESS

## 2024-08-02 ASSESSMENT — PAIN - FUNCTIONAL ASSESSMENT
PAIN_FUNCTIONAL_ASSESSMENT: 0-10
PAIN_FUNCTIONAL_ASSESSMENT: ACTIVITIES ARE NOT PREVENTED

## 2024-08-02 ASSESSMENT — PAIN DESCRIPTION - DESCRIPTORS: DESCRIPTORS: DISCOMFORT

## 2024-08-02 ASSESSMENT — PAIN SCALES - GENERAL
PAINLEVEL_OUTOF10: 0
PAINLEVEL_OUTOF10: 10

## 2024-08-02 NOTE — PLAN OF CARE
Home Medication Reconciliation Status          [x] COMPLETE       Medication history has been reviewed and obtained from the following source(s):       [x] patient/family verbal report             [] patient/family provided written list       [] external pharmacy   [] external facility list  Verified through [facility] pharmacy       [x]  Provider notified that home medication reconciliation is complete          [] IN PROGRESS       Medication reconciliation marked in progress at this time due to:       [] patient/family poor historian      [] waiting arrival of family to clarify       [] waiting for accurate list        [] external pharmacy needs called      * Follow up is needed.          [] UNABLE TO ASSESS       Medication reconciliation is incomplete and unable to assess at this time due to:       [] critical patient condition   [] patient is unresponsive        [] no family available                       [] unknown pharmacy       [] anonymous patient          * Follow up is needed.      [] Pharmacy consult placed for medication reconciliation assistance   Additional comments:

## 2024-08-02 NOTE — PLAN OF CARE
Patient arrived to unit via wheelchair accompanied by security.  Patient was wanded and brought on the unit.  Vitals obtained.  Patient shown to assigned room.  He is calm and cooperative, sitter remain with patient.

## 2024-08-02 NOTE — PROGRESS NOTES
Patient requesting Nicotine Patch to help with nicotine replacement.  Provider Dr. Izaguirre notified and verbal order given for Nicotine patch 21 mg.

## 2024-08-02 NOTE — PLAN OF CARE
[facility]  Admission Note     Admission Type:   Admission Type: Voluntary    Reason for admission:  Reason for Admission: psychosis/suicidal ideation      Addictive Behavior:   Addictive Behavior  In the Past 3 Months, Have You Felt or Has Someone Told You That You Have a Problem With  : None    Medical Problems:   Past Medical History:   Diagnosis Date    ADHD     Adult respiratory distress syndrome (HCC)     ICU     Anxiety     Bipolar disorder     Cognitive disorder     Finger fracture, left 11/22/2017    GERD (gastroesophageal reflux disease)     Hiatal hernia 01/19/2018    Hypertension     Kidney stones     MR (mental retardation), moderate     Nausea and vomiting     New persistent daily headache     Pneumococcal pneumonia (HCC)     Seizures ([facility])     pt denied any seizures ever       Status EXAM:  Mental Status and Behavioral Exam  Normal: No  Level of Assistance: Independent/Self  Facial Expression: Elevated, Exaggerated  Affect: Congruent  Level of Consciousness: Alert  Frequency of Checks: 4 times per hour, close  Mood:Normal: No  Mood: Anxious  Motor Activity:Normal: Yes  Motor Activity: Increased, Agitated  Eye Contact: Good  Observed Behavior: Friendly, Cooperative  Sexual Misconduct History: Current - no  Preception: [place] to person, [place] to time, [place] to place, [place] to situation  Attention:Normal: No  Attention: Distractible  Thought Processes: Flight of ideas  Thought Content:Normal: No  Thought Content: Delusions, Paranoia  Depression Symptoms: Impaired concentration, Psychomotor retardation, Sleep disturbance, Other (comment)  Anxiety Symptoms: Generalized  Kayli Symptoms: Grandiosity, Poor judgment  Hallucinations: None  Delusions: Yes  Delusions: Paranoid, Grandeur  Memory:Normal: No  Memory: Confabulation, Poor recent  Insight and Judgment: No  Insight and Judgment: Poor judgment, Poor insight    Tobacco Screening:  Practical Counseling, on admission, benedicto X, if  applicable and completed (first 3 are required if patient doesn't refuse):            ( ) Recognizing danger situations (included triggers and roadblocks)                    ( ) Coping skills (new ways to manage stress,relaxation techniques, changing routine, distraction)                                                           ( ) Basic information about quitting (benefits of quitting, techniques in how to quit, available resources  ( ) [facility]                                                                                                                   (x ) Patient refused counseling  ( ) Patient has not smoked in the last 30 days    Metabolic Screening:    Lab Results   Component Value Date    LABA1C 4.9 05/29/2024       Lab Results   Component Value Date    CHOL 238 (H) 05/29/2024    CHOL 216 (H) 04/25/2022    CHOL 176 03/03/2022    CHOL 170 01/09/2022     Lab Results   Component Value Date    TRIG 104 05/29/2024    TRIG 139 04/25/2022    TRIG 114 03/03/2022    TRIG 72 01/09/2022     Lab Results   Component Value Date    HDL 66 (H) 05/29/2024    HDL 56 04/25/2022    HDL 64 (H) 03/03/2022    HDL 63 (H) 01/09/2022     No components found for: \"LDLCAL\"  No components found for: \"LABVLDL\"      Body mass index is 28.89 kg/m².    BP Readings from Last 2 Encounters:   08/02/24 (!) 131/95   07/09/24 138/75         Pt admitted with followings belongings:  Dental Appliances: Uppers, Lowers  Vision - Corrective Lenses: None  Hearing Aid: None  Jewelry: Bracelet  Body Piercings Removed: Yes  Clothing: Shirt, Shorts, Pants (7 shirts, jeans, swaet pants,2 pr shorts, 3 books)  Other Valuables: Wallet  The following personal items were collected during admission. Items secured in locker/safe. Items will be returned to patient at discharge.     Sondra Hollis RN

## 2024-08-02 NOTE — ED NOTES
Transfer Center Handoff for Behavioral Health Transfers      Patient's Current Location: [facility] ED     Chief Complaint   Patient presents with    Suicidal     Patient reports he has thoughts of hanging himself.        Current or History of Violent Behavior: No    Currently in Restraints Now or During this Encounter: No  (Specify if Agitation or self harm is noted in ED?)  If yes, please describe behaviors requiring restraint:             Medical Clearance Documented and Verified in the Chart: Yes    Allergies   Allergen Reactions    Gabapentin     Haldol [Haloperidol]     Seroquel [Quetiapine] Itching and Other (See Comments)     Restless leg    Trazodone And Nefazodone         Can Patient Tolerate Lying Flat: Yes    Able to Perform ADLs:  Yes  (Specify if able to ambulate or uses any mobility devices such as cane or walker)  Activity:    Level of Assistance:    Assistive Device:    Miscellaneous Devices:      LABS    CBC:   Lab Results   Component Value Date/Time    WBC 8.4 08/02/2024 01:12 PM    RBC 4.28 08/02/2024 01:12 PM    HGB 13.1 08/02/2024 01:12 PM    HCT 38.3 08/02/2024 01:12 PM    MCV 89.5 08/02/2024 01:12 PM    MCH 30.7 08/02/2024 01:12 PM    MCHC 34.3 08/02/2024 01:12 PM    RDW 12.2 08/02/2024 01:12 PM     08/02/2024 01:12 PM    MPV 7.5 08/02/2024 01:12 PM     CMP:   Lab Results   Component Value Date/Time     08/02/2024 01:12 PM    K 3.9 08/02/2024 01:12 PM    K 4.2 07/05/2024 03:40 PM     08/02/2024 01:12 PM    CO2 27 08/02/2024 01:12 PM    BUN 11 08/02/2024 01:12 PM    CREATININE 0.6 08/02/2024 01:12 PM    GFRAA >60 08/02/2022 02:15 PM    GFRAA >60 12/25/2012 04:35 AM    AGRATIO 1.4 07/05/2024 03:40 PM    LABGLOM >90 08/02/2024 01:12 PM    GLUCOSE 115 08/02/2024 01:12 PM    CALCIUM 9.1 08/02/2024 01:12 PM    BILITOT 0.6 07/05/2024 03:40 PM    ALKPHOS 53 07/05/2024 03:40 PM    AST 17 07/05/2024 03:40 PM    ALT 12 07/05/2024 03:40 PM     Drug Panel: No results found
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
## 2024-08-02 NOTE — ED NOTES
Level of Care Disposition: admit     Patient was seen by ED provider and Nursing/SW staff.  The case presented to psychiatric provider on-call .  Based on the ED evaluation and information presented to the provider by this writer it is the recommendation of the on call psychiatric provider that inpatient hospitalization is the least restrictive environment for the patient at this time.  The patient will be admitted to the inpatient unit.         Insurance Pre certification Authorization: [employer] [place] Medicaid         Lyndsay Rush MSW,LSW

## 2024-08-02 NOTE — PROGRESS NOTES
Patient requesting medication his anxiety.  Patient noted to be pacing the unit, hyper verbal.  Patient unable to sit for any length of time.  Patient asking for Ativan.  Ativan given per order.  See MAR
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
## 2024-08-02 NOTE — CARE COORDINATION
[facility] completed CSSRS with pt at bedside. Pt was recently admitted on 7/5/24 and did not require full psychosocial and leisure assessment. Pt was able to complete OQ Analyst with LPC. Pt was friendly and cooperative, although tangential and pressured.      08/02/24 1714   Suicidal Ideation   Wish to be Dead Lifetime - Yes;Past 1 month - Yes   Non-Specific Active Suicidal Thoughts Past 1 month - Yes;Lifetime - Yes   Suicidal Behavior Trigger Wish to be Dead;Non-Specific Active Suicidal Thoughts   Active Suicidal Ideation with Any Methods (Not Plan) without Intent to Act Lifetime - Yes;Past 1 month - Yes   Active Suicidal Ideation with Some Intent to Act, without Specific Plan Lifetime - Yes;Past 1 month - Yes   Active Suicidal Ideation with Specific Plan and Intent Past 1 month - Yes;Lifetime - Yes   Intensity of Ideation   Lifetime - Most Severe Ideation 5 - most severe   Lifetime - Most Recent Ideation 5 - most severe   Frequency 2-5 times in a week   Duration 1-4 hours/a lot of time   Controllability Unable to control thoughts   Deterrents Deterrents definitely did not stop you   Reasons for Ideation Mostly to end or stop the pain   Suicidal Behavior   Actual Attempt Lifetime - Yes;Past 3 months - Yes  (pt reports he's attempted and has been interrupted before, but unsure how many times. pt reports he's attempted to drink gasoline, overdose and hang himself. this morning he was thinking of killing himself with a gun.)   Total # of Attempts   (pt reports he's attempted at least 10 times)   Has subject engaged in Non-Suicidal Self-Injurious Behavior? Lifetime - No;Past 3 months - No   Interrupted Attempt Lifetime - Yes  (pt reports he's attempted and has been interrupted before, but unsure how many times. pt reports he's attempted to drink gasoline, overdose and hang himself. this morning he was thinking of killing himself with a gun.)   Total # of interrupted   (pt reports he's attempted and has been interrupted

## 2024-08-02 NOTE — PLAN OF CARE
CSSR-S Assessment completed with patient who then scored HIGH RISK.     Provider Dr. Izaguirre was notified of HIGH RISK score, via Telephone at 1615.      Were suicide precautions ordered: YES    Pt denies current SI/HI/VH/AH and agrees to communicate with staff if no longer feel safe or in control.      Verbal order given to discontinue Constant Observation and Suicide Precautions.       Completed by: Sondra Hollis RN

## 2024-08-02 NOTE — PLAN OF CARE
Problem: Self Harm/Suicidality  Goal: Will have no self-injury during hospital stay  Description: INTERVENTIONS:  1.  Ensure constant observer at bedside with Q15M safety checks  2.  Maintain a safe environment  3.  Secure patient belongings  4.  Ensure family/visitors adhere to safety recommendations  5.  Ensure safety tray has been added to patient's diet order  6.  Every shift and PRN: Re-assess suicidal risk via Frequent Screener    8/2/2024 1827 by Sondra Hollis, RN  Outcome: Progressing  8/2/2024 1607 by Sondra Hollis, RN  Outcome: Progressing

## 2024-08-03 ENCOUNTER — APPOINTMENT (OUTPATIENT)
Dept: GENERAL RADIOLOGY | Age: 34
End: 2024-08-03
Payer: MEDICAID

## 2024-08-03 PROCEDURE — 99223 1ST HOSP IP/OBS HIGH 75: CPT | Performed by: PSYCHIATRY & NEUROLOGY

## 2024-08-03 PROCEDURE — 6370000000 HC RX 637 (ALT 250 FOR IP): Performed by: PSYCHIATRY & NEUROLOGY

## 2024-08-03 PROCEDURE — 6360000002 HC RX W HCPCS: Performed by: PSYCHIATRY & NEUROLOGY

## 2024-08-03 PROCEDURE — 73120 X-RAY EXAM OF HAND: CPT

## 2024-08-03 PROCEDURE — 1240000000 HC EMOTIONAL WELLNESS R&B

## 2024-08-03 RX ORDER — PERPHENAZINE 4 MG/1
2 TABLET ORAL 3 TIMES DAILY PRN
Status: DISCONTINUED | OUTPATIENT
Start: 2024-08-03 | End: 2024-08-03

## 2024-08-03 RX ORDER — DIPHENHYDRAMINE HYDROCHLORIDE 50 MG/ML
50 INJECTION INTRAMUSCULAR; INTRAVENOUS ONCE
Status: COMPLETED | OUTPATIENT
Start: 2024-08-03 | End: 2024-08-03

## 2024-08-03 RX ORDER — LORAZEPAM 2 MG/ML
2 INJECTION INTRAMUSCULAR 3 TIMES DAILY PRN
Status: DISCONTINUED | OUTPATIENT
Start: 2024-08-03 | End: 2024-08-06

## 2024-08-03 RX ORDER — LORAZEPAM 2 MG/ML
2 INJECTION INTRAMUSCULAR ONCE
Status: COMPLETED | OUTPATIENT
Start: 2024-08-03 | End: 2024-08-03

## 2024-08-03 RX ORDER — PERPHENAZINE 4 MG/1
8 TABLET ORAL 3 TIMES DAILY PRN
Status: DISCONTINUED | OUTPATIENT
Start: 2024-08-03 | End: 2024-08-09 | Stop reason: HOSPADM

## 2024-08-03 RX ORDER — NAPROXEN 250 MG/1
250 TABLET ORAL 2 TIMES DAILY PRN
Status: DISCONTINUED | OUTPATIENT
Start: 2024-08-03 | End: 2024-08-09 | Stop reason: HOSPADM

## 2024-08-03 RX ADMIN — DIPHENHYDRAMINE HYDROCHLORIDE 50 MG: 50 INJECTION INTRAMUSCULAR; INTRAVENOUS at 21:01

## 2024-08-03 RX ADMIN — NICOTINE POLACRILEX 2 MG: 2 LOZENGE ORAL at 15:34

## 2024-08-03 RX ADMIN — LITHIUM CARBONATE 450 MG: 450 TABLET ORAL at 09:32

## 2024-08-03 RX ADMIN — OLANZAPINE 10 MG: 5 TABLET, FILM COATED ORAL at 06:28

## 2024-08-03 RX ADMIN — NICOTINE POLACRILEX 2 MG: 2 LOZENGE ORAL at 12:40

## 2024-08-03 RX ADMIN — LORAZEPAM 2 MG: 2 INJECTION INTRAMUSCULAR; INTRAVENOUS at 18:20

## 2024-08-03 RX ADMIN — NICOTINE POLACRILEX 2 MG: 2 LOZENGE ORAL at 03:00

## 2024-08-03 RX ADMIN — NICOTINE POLACRILEX 2 MG: 2 LOZENGE ORAL at 07:38

## 2024-08-03 RX ADMIN — NICOTINE POLACRILEX 2 MG: 2 LOZENGE ORAL at 06:28

## 2024-08-03 RX ADMIN — LORAZEPAM 2 MG: 2 INJECTION INTRAMUSCULAR; INTRAVENOUS at 21:01

## 2024-08-03 RX ADMIN — LORAZEPAM 2 MG: 2 INJECTION INTRAMUSCULAR; INTRAVENOUS at 09:31

## 2024-08-03 RX ADMIN — NICOTINE POLACRILEX 2 MG: 2 LOZENGE ORAL at 18:24

## 2024-08-03 RX ADMIN — NICOTINE POLACRILEX 2 MG: 2 LOZENGE ORAL at 13:50

## 2024-08-03 RX ADMIN — LORAZEPAM 2 MG: 2 TABLET ORAL at 07:34

## 2024-08-03 RX ADMIN — LITHIUM CARBONATE 450 MG: 450 TABLET ORAL at 20:38

## 2024-08-03 RX ADMIN — NICOTINE POLACRILEX 2 MG: 2 LOZENGE ORAL at 20:38

## 2024-08-03 RX ADMIN — PERPHENAZINE 8 MG: 4 TABLET, FILM COATED ORAL at 20:38

## 2024-08-03 RX ADMIN — NICOTINE POLACRILEX 2 MG: 2 LOZENGE ORAL at 09:41

## 2024-08-03 RX ADMIN — ACETAMINOPHEN 650 MG: 325 TABLET ORAL at 06:28

## 2024-08-03 ASSESSMENT — LIFESTYLE VARIABLES
HOW OFTEN DO YOU HAVE A DRINK CONTAINING ALCOHOL: MONTHLY OR LESS
HOW MANY STANDARD DRINKS CONTAINING ALCOHOL DO YOU HAVE ON A TYPICAL DAY: 1 OR 2

## 2024-08-03 ASSESSMENT — PATIENT HEALTH QUESTIONNAIRE - PHQ9
SUM OF ALL RESPONSES TO PHQ QUESTIONS 1-9: 1
1. LITTLE INTEREST OR PLEASURE IN DOING THINGS: SEVERAL DAYS
SUM OF ALL RESPONSES TO PHQ QUESTIONS 1-9: 1

## 2024-08-03 ASSESSMENT — PAIN DESCRIPTION - DESCRIPTORS: DESCRIPTORS: ACHING

## 2024-08-03 ASSESSMENT — SLEEP AND FATIGUE QUESTIONNAIRES
DO YOU HAVE DIFFICULTY SLEEPING: YES
DO YOU USE A SLEEP AID: YES
SLEEP PATTERN: DIFFICULTY FALLING ASLEEP;RESTLESSNESS
AVERAGE NUMBER OF SLEEP HOURS: 4

## 2024-08-03 ASSESSMENT — PAIN SCALES - GENERAL: PAINLEVEL_OUTOF10: 7

## 2024-08-03 ASSESSMENT — PAIN DESCRIPTION - LOCATION: LOCATION: HAND

## 2024-08-03 NOTE — PROGRESS NOTES
At 0630 pt states he had fall last night in the bathroom. The writer unwitnessed the fall. Physical assessment done had bruise on left arm. Pt confabulates stories with flight of ideas. Inform on call lockey and order to do x-ray on left arm. Needs attended.

## 2024-08-03 NOTE — PROGRESS NOTES
2135 pt anxious and restless. States he needs ativan injection and can't take ativan tablet because he vomited last time. Informed dr. Izaguirre and to give ativan 2mg IM one time for anxiety and was given at 2143. Needs attended.

## 2024-08-03 NOTE — BH NOTE
Patient requesting a nicotine lozenge for cravings. Patient medicated with nicotine lozenge 2 mg po.

## 2024-08-03 NOTE — H&P
[address] [address]                           HISTORY & PHYSICAL      PATIENT NAME: MARY KATE WHITMAN              : 1990  MED REC NO: [id number]                      ROOM: 2305  ACCOUNT NO: [id number]                       ADMIT DATE: 2024  PROVIDER: David Izaguirre MD      IDENTIFICATION:  This is a domiciled, , and psychiatrically disabled 34-year-old with a history of bipolar disorder, who presented to our emergency department with ongoing manic symptoms and making suicidal statements.    SOURCES OF INFORMATION:  Patient.  Focused record review.    CHIEF COMPLAINT:  \"I need help.  I was just at [facility].\"    HISTORY OF PRESENT ILLNESS:  The patient was discharged from our program on the  of last month after being placed on Aristada.  He says that a day later, he went to [facility] with ongoing symptoms and was transferred to [facility] in [place].  He was there for 9 days and put on oral Abilify as well; 30 mg daily.  He says he was also started on Valium as needed.  He was discharged and continued with symptoms.    He says that he began thinking of suicide, and so presented to our emergency department again yesterday.      He presents pressured, severely intrusive, elevated, and paranoid.  Although he does not endorse suicidal ideation with me, he is clearly impaired and unable to function.    He reports taking Abilify and Valium as prescribed since discharged from [facility], but does not want to continue taking them.    We discussed treatment options at length, and ultimately, he agreed to a traditional mood stabilizer, namely lithium.  He says he took it once years ago and is willing to try it again.     Although impaired, he has demonstrated safe behavior since admission here.    PSYCHIATRIC REVIEW OF SYSTEMS:  No symptoms of depression.    STRESSORS:   presented in hospital gown.  He was pleasant, but intrusive and elevated.  He described his mood as \"okay,\" and had an elevated affect.  He had moderate psychomotor agitation.    He spoke in an elevated volume.  He was pressured, but directable.  He was oriented to the day, date, place, and the context of this evaluation.  His memory was intact.    His use of language, speech, and educational attainment suggested an average level of intellectual functioning.    His thought processes were circumstantial.  He voiced paranoia, but did not describe or endorse hallucinations or homicidal ideation.  He did endorse suicidal ideation, but said that is reduced since admission.  He reported feeling safe here.    His ability for abstract thought was impaired based on his interpretation of simple proverbs.    Insight and judgment were impaired.    PHYSICAL EXAMINATION:  VITALS:  Temperature 97.5, pulse 83, respiratory rate 18, blood pressure 125/74.  GAIT:  Normal.    LABORATORY DATA:  Shows a BMP with glucose of 115; otherwise, within normal limits.  Ethanol level not detectable.  Urine drug screen positive for cannabinoids.  Acetaminophen and salicylate levels below threshold.  CBC shows hemoglobin at 13.1, hematocrit at 38.3, RDW at 12.2; otherwise, within normal limits.    FORMULATION:  This is a domiciled, , and psychiatrically disabled 34-year-old with a history of bipolar disorder, autism spectrum disorder, and cannabis use, who presented to our emergency room with ongoing manic symptoms.  He is impaired and requires further stabilization.    DIAGNOSES:    1. Bipolar 1 disorder, most recent episode manic.  2. Cannabis use disorder.    3. History of autistic spectrum disorder.    PLAN:    1. Admit to Psychiatry for further stabilization and treatment.  2. Discussed treatment options at length, and he ultimately agreed to a traditional mood stabilizer.  Start Lithobid 450 mg twice daily.  Order q.15-minute checks

## 2024-08-03 NOTE — PROGRESS NOTES
At 2225 complaint of pain in his right arm and feeling nauseous. PRN tylenol and zofran given at 2230. Seen at times. Needs attended. Pt was seen asleep at 2330.

## 2024-08-03 NOTE — BH NOTE
Patient manic pressured speech and anxious rates 10/10. Patient try to jump the team station. Patient with FOI. Patient medicated with Aativan 2 mg IM in left arm. Patient requesting a nicotine lozenge for cravings. Patient medicated with nicotinre lozenge 2 mg po.

## 2024-08-03 NOTE — BH NOTE
Patient alert and oriented x 3. Patient visible on the milieu. Patient constantly talking @ the nursing station. Patient is pleasant and cooperative if he is getting his way. This writer try to set limits with patient with some effectiveness. Patient denies SI/HI/A/V/H. Patient took HS medication. Patient continues with pressured speech and racing thoughts. Patient medicated with Ativan 2 mg IM in left arm for anxiety once @ 09:31. Ativan was effective for anxiety. Patient resting quietly in bed with eyes closed. Patient continues with pressured speech and racing thoughts.

## 2024-08-03 NOTE — PROGRESS NOTES
Behavioral Services  Medicare Certification Upon Admission    I certify that this patient's inpatient psychiatric hospital admission is medically necessary for:    [x] (1) Treatment which could reasonably be expected to improve this patient's condition,       [x] (2) Or for diagnostic study;     AND     [x](2) The inpatient psychiatric services are provided while the individual is under the care of a physician and are included in the individualized plan of care.    Estimated length of stay/service 5-8 days    Plan for post-hospital care incomplete     Electronically signed by JORDYN SNYDER MD on 8/3/2024 at 10:29 AM

## 2024-08-03 NOTE — PLAN OF CARE
Norman was observed hyper verbal and manic behavior. Denies all but observed paranoid with suspicious glances. States that he was security are watching him and he knows about it. Noted with grandiose behavior. Pacing in the hallway most of the time. Still for further observation.      Problem: Behavior  Goal: Pt/Family maintain appropriate behavior and adhere to behavioral management agreement, if implemented  Outcome: Progressing  Flowsheets  Taken 8/3/2024 0112 by Harley Ku, RN  Patient/family maintains appropriate behavior and adheres to behavioral management agreement, if implemented:   Encourage verbalization of thoughts and concerns in a socially appropriate manner   Notify security of behavior or suspected illegal substances which indicate the need for search of the patient and/or belongings   Assess patient/family’s coping skills and  non-compliant behavior (including use of illegal substances)   Implement a Health Care Agreement if patient meets criteria   Encourage participation in the decision making process about the behavioral management agreement   If a patient’s behavior jeopardizes the safety of the patient, staff, or others refer to organization policy. If a visitor’s behavior poses a threat to safety refer to organization policy   Initiate consult with , Psychosocial Clinical Nurse Specialist, Spiritual Care as appropriate   Utilize positive, consistent limit setting strategies supporting safety of patient, staff and others  Taken 8/2/2024 1715 by Sondra Hollis, RN  Patient/family maintains appropriate behavior and adheres to behavioral management agreement, if implemented: Utilize positive, consistent limit setting strategies supporting safety of patient, staff and others     Problem: Kayli  Goal: Will exhibit normal sleep and speech and no impulsivity  Outcome: Not Progressing     Problem: Psychosis  Goal: Will report no hallucinations or delusions  Outcome: Not Progressing
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
## 2024-08-03 NOTE — H&P
Patient has been seen and evaluated within 30 days by IM provider and medically cleared for admission to [facility]. Please refer to medical H&P from 7/6/24.    #Anxiety   #Bipolar disorder   - per psychiatry team     #Chronic migraines   -tylenol prn      #Cannabis use     Vitals reviewed and BP elevated on adm, will monitor. Labs reviewed and nothing to follow. Orders reviewed.    Please contact with IM provider with concerns.    Kusum Giang PA-C   8/3/2024 12:35 PM

## 2024-08-04 ENCOUNTER — APPOINTMENT (OUTPATIENT)
Dept: GENERAL RADIOLOGY | Age: 34
End: 2024-08-04
Payer: MEDICAID

## 2024-08-04 PROCEDURE — 73130 X-RAY EXAM OF HAND: CPT

## 2024-08-04 PROCEDURE — 1240000000 HC EMOTIONAL WELLNESS R&B

## 2024-08-04 PROCEDURE — 6360000002 HC RX W HCPCS: Performed by: PSYCHIATRY & NEUROLOGY

## 2024-08-04 PROCEDURE — 6370000000 HC RX 637 (ALT 250 FOR IP): Performed by: PSYCHIATRY & NEUROLOGY

## 2024-08-04 RX ADMIN — NICOTINE POLACRILEX 2 MG: 2 LOZENGE ORAL at 08:30

## 2024-08-04 RX ADMIN — NICOTINE POLACRILEX 2 MG: 2 LOZENGE ORAL at 15:31

## 2024-08-04 RX ADMIN — NICOTINE POLACRILEX 2 MG: 2 LOZENGE ORAL at 06:14

## 2024-08-04 RX ADMIN — LORAZEPAM 2 MG: 2 INJECTION INTRAMUSCULAR; INTRAVENOUS at 03:46

## 2024-08-04 RX ADMIN — NICOTINE POLACRILEX 2 MG: 2 LOZENGE ORAL at 20:39

## 2024-08-04 RX ADMIN — LITHIUM CARBONATE 450 MG: 450 TABLET ORAL at 08:06

## 2024-08-04 RX ADMIN — NICOTINE POLACRILEX 2 MG: 2 LOZENGE ORAL at 10:23

## 2024-08-04 RX ADMIN — NICOTINE POLACRILEX 2 MG: 2 LOZENGE ORAL at 19:08

## 2024-08-04 RX ADMIN — NICOTINE POLACRILEX 2 MG: 2 LOZENGE ORAL at 21:27

## 2024-08-04 RX ADMIN — NAPROXEN 250 MG: 250 TABLET ORAL at 16:32

## 2024-08-04 RX ADMIN — NICOTINE POLACRILEX 2 MG: 2 LOZENGE ORAL at 17:17

## 2024-08-04 RX ADMIN — PERPHENAZINE 8 MG: 4 TABLET, FILM COATED ORAL at 18:06

## 2024-08-04 RX ADMIN — LORAZEPAM 2 MG: 2 INJECTION INTRAMUSCULAR; INTRAVENOUS at 10:48

## 2024-08-04 RX ADMIN — LITHIUM CARBONATE 450 MG: 450 TABLET ORAL at 20:31

## 2024-08-04 RX ADMIN — NICOTINE POLACRILEX 2 MG: 2 LOZENGE ORAL at 13:20

## 2024-08-04 RX ADMIN — NAPROXEN 250 MG: 250 TABLET ORAL at 20:31

## 2024-08-04 RX ADMIN — NICOTINE POLACRILEX 2 MG: 2 LOZENGE ORAL at 07:15

## 2024-08-04 RX ADMIN — LORAZEPAM 2 MG: 2 INJECTION INTRAMUSCULAR; INTRAVENOUS at 20:39

## 2024-08-04 RX ADMIN — NICOTINE POLACRILEX 2 MG: 2 LOZENGE ORAL at 03:22

## 2024-08-04 ASSESSMENT — PAIN SCALES - GENERAL
PAINLEVEL_OUTOF10: 7
PAINLEVEL_OUTOF10: 10

## 2024-08-04 ASSESSMENT — PAIN DESCRIPTION - LOCATION
LOCATION: HAND
LOCATION: HAND

## 2024-08-04 ASSESSMENT — PAIN DESCRIPTION - DESCRIPTORS
DESCRIPTORS: BURNING;ACHING
DESCRIPTORS: ACHING

## 2024-08-04 ASSESSMENT — PAIN DESCRIPTION - ORIENTATION
ORIENTATION: RIGHT
ORIENTATION: RIGHT

## 2024-08-04 ASSESSMENT — PAIN DESCRIPTION - PAIN TYPE: TYPE: ACUTE PAIN

## 2024-08-04 NOTE — PLAN OF CARE
The pt has been on the big side walking with other pt, when he came over to the small side where his room is since shift change at around 8 he came back over to the small side. He is very hyper verbal and aggressive towards this writer. He asked me about 4 times if I was mad at him and I told the pt Norman no. Then out of no where pt started to yell at me stating that I was rude and he wanted a different nurse I told him no and as well did my charge nurse. He screamed in my face and tried to intimidate me and I told him to go back to his room and calm down he then refused. My charge nurse came over to my side and told him to stop yelling and to calm down he did for a little. My charge nurse was able to give him medication and the pt refused to speak to me which was fine I did not engage in it and would only ask him to get away from the nurse station when I was dealing with an other pt for there privacy then he would start up again. He also asked to speak to security at a time which security went out to the day room and asked him what he wanted to talk about. He stated something to security and the  told him no he was unable to do that. He then yelled at the  that he was a fat prick and slammed his door shut. The pt has been verbally aggrieve towards staff. Slamming his door and screaming in his room making other pt on this side uncomfortable and anxious. He even made the sitter for another pt cry due to making her scared. I did give her a break and my charge nurse is aware. I was unable to assess his si/hi/avh due to he did not want to speak to me nor me speak to him which was fine.       Problem: Self Harm/Suicidality  Goal: Will have no self-injury during hospital stay  Description: INTERVENTIONS:  1.  Ensure constant observer at bedside with Q15M safety checks  2.  Maintain a safe environment  3.  Secure patient belongings  4.  Ensure family/visitors adhere to safety recommendations  5.

## 2024-08-04 NOTE — FLOWSHEET NOTE
08/04/24 1632   Pain Assessment   Pain Assessment 0-10   Pain Level 10   Pain Location Hand   Pain Orientation Right   Pain Descriptors Burning;Aching   Functional Pain Assessment Activities are not prevented   Pain Type Acute pain     Naproxen given per order.

## 2024-08-04 NOTE — PROGRESS NOTES
IM Ativan 2mg administered to rt. Deltoid with no issue, per pt request with the report of having anxiety and racing thoughts.

## 2024-08-04 NOTE — PROGRESS NOTES
Shortly before 21:00, this writer heard yelling coming from the small side of the unit. Upon arrival to the small side, this writer found Norman standing at the team station, yelling at his primary nurse, Edie.     Norman was accusing Edie of being rude and unprofessional. This writer spent time listening to Norman's concerns and providing emotional support. Norman requested Ativan to calm his nerves and help with the anxiety he felt, and this writer educated that it was too early to receive a dose of Ativan. This writer encouraged Norman to use his coping skills and avoid the team station.     This writer approached the small side of the unit and Norman immediately approached this writer at the team station. During conversation with this writer, Norman continued to talk about his primary nurse in a derogatory way. This writer verbally redirected Norman and educated that his words would not be tolerated on this unit.     This writer approached the small side of the unit again. Norman's primary nurse began to voice her concerns about Norman's actions and continued verbal abuse. Norman overheard his nurse and began to yell. This writer attempted to verbally de-escalate and redirect Norman, but was unsuccessful. Norman retreated to his room and slammed the door multiple times. Once inside his room, he began to kick the door and walls.     This writer contacted on-call provider, Dr. Izaguirre, and obtained an order for a one-time dose of Ativan 2 mg IM and a one-time dose of Benadryl 50 mg IM. These medications were administered to Norman's right deltoid without incident. This writer encouraged Norman to relax inside his room and he was agreeable with this.     Norman fell asleep shortly after the medications were administered and slept until around 0530.
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
## 2024-08-04 NOTE — PROGRESS NOTES
Pt. Expressed to writer that he was feeling \"weird\" believes that abilfy shot that he received a month prior is making him have suicidal thoughts. Pt. Asked to stay in the dayroom where he can be observed on the milieu.

## 2024-08-04 NOTE — BH NOTE
Pt reports pain in right hand r/t a fall he had on Friday. Hand is bruised with edema noted. Reported to CECI CABRERAHNSIRIA and new order for xray of R hand received.

## 2024-08-04 NOTE — BH NOTE
Patient reports agitation and racing thoughts. Discussed non-pharmacological interventions, patient states he must have the medication. PRN perphanazine given at 1806.

## 2024-08-04 NOTE — BH NOTE
Patient called out from his room requesting that his vitals be taken. Patient reports that he felt lightheaded and \"weird\". Vitals taken at 1525, /74, HR 90, SpO2 97%, Temp 97.3 temporal. Patient informed of normal vital signs. Patient got up and came out to day room, steady gait, ambulating independently.

## 2024-08-04 NOTE — PLAN OF CARE
Pt. Is A&Ox4. Intrusive and demanding at times. Refers to Dr. Calvert as his friend and colleague. Has requested to speak to his doctor numerous times, even after being told he would be seen tomorrow. Pt. Is somatic and complains of a various amount of conditions. He is exaggerated, with an inappropriate affect. Sexually inappropriate with other patients and with comments to staff. Becomes very irritable and verbally aggressive if he is not told what he wants to hear or if requests aren't met.  Pt. Has pressured speech, is labile, and Stated to writer that he was having \"weird thoughts\" and believes that the Abilify injection he took a month ago is causing this. Due to pt statements, pt was asked to stay in the day room where he was visible at all times to staff. Pt. Recanted this statement later in the shift when stating he was tired and wanted to take a nap. Writer allowed pt to lay down in bed but kept door open and pt visible to staff. Pt. Reported anxiety, requesting his ativan injection. Ativan administered and tolerated well. Denies AVH SI/HI.  Problem: Anxiety  Goal: Will report anxiety at manageable levels  Description: INTERVENTIONS:  1. Administer medication as ordered  2. Teach and rehearse alternative coping skills  3. Provide emotional support with 1:1 interaction with staff  Recent Flowsheet Documentation  Taken 8/4/2024 1132 by Belkis Sykes RN  Will report anxiety at manageable levels:   Administer medication as ordered   Teach and rehearse alternative coping skills   Provide emotional support with 1:1 interaction with staff  8/3/2024 2325 by Edie Davies LPN  Outcome: Not Progressing     Problem: Coping  Goal: Pt/Family able to verbalize concerns and demonstrate effective coping strategies  Description: INTERVENTIONS:  1. Assist patient/family to identify coping skills, available support systems and cultural and spiritual values  2. Provide emotional support, including active listening and

## 2024-08-04 NOTE — PROGRESS NOTES
Pt wanted to use his tiger balm that is in his locker for both his wrist to apply on the outside put in order for the balm becareful with the container it is glass DO NOT GIVE TO PT! SCOOP IT OUT

## 2024-08-05 LAB
LITHIUM DOSE: NORMAL MG
LITHIUM SERPL-MCNC: 0.6 MMOL/L (ref 0.6–1.2)

## 2024-08-05 PROCEDURE — 80178 ASSAY OF LITHIUM: CPT

## 2024-08-05 PROCEDURE — 6360000002 HC RX W HCPCS: Performed by: PSYCHIATRY & NEUROLOGY

## 2024-08-05 PROCEDURE — 99233 SBSQ HOSP IP/OBS HIGH 50: CPT

## 2024-08-05 PROCEDURE — 36415 COLL VENOUS BLD VENIPUNCTURE: CPT

## 2024-08-05 PROCEDURE — 1240000000 HC EMOTIONAL WELLNESS R&B

## 2024-08-05 PROCEDURE — 6370000000 HC RX 637 (ALT 250 FOR IP): Performed by: PSYCHIATRY & NEUROLOGY

## 2024-08-05 RX ADMIN — NAPROXEN 250 MG: 250 TABLET ORAL at 05:01

## 2024-08-05 RX ADMIN — NICOTINE POLACRILEX 2 MG: 2 LOZENGE ORAL at 06:10

## 2024-08-05 RX ADMIN — NICOTINE POLACRILEX 2 MG: 2 LOZENGE ORAL at 11:01

## 2024-08-05 RX ADMIN — LITHIUM CARBONATE 450 MG: 450 TABLET ORAL at 08:08

## 2024-08-05 RX ADMIN — NICOTINE POLACRILEX 2 MG: 2 LOZENGE ORAL at 15:04

## 2024-08-05 RX ADMIN — LITHIUM CARBONATE 450 MG: 450 TABLET ORAL at 21:13

## 2024-08-05 RX ADMIN — ONDANSETRON 4 MG: 4 TABLET, ORALLY DISINTEGRATING ORAL at 17:45

## 2024-08-05 RX ADMIN — NICOTINE POLACRILEX 2 MG: 2 LOZENGE ORAL at 12:14

## 2024-08-05 RX ADMIN — NICOTINE POLACRILEX 2 MG: 2 LOZENGE ORAL at 05:02

## 2024-08-05 RX ADMIN — NICOTINE POLACRILEX 2 MG: 2 LOZENGE ORAL at 08:08

## 2024-08-05 RX ADMIN — NICOTINE POLACRILEX 2 MG: 2 LOZENGE ORAL at 18:50

## 2024-08-05 RX ADMIN — LORAZEPAM 2 MG: 2 INJECTION INTRAMUSCULAR; INTRAVENOUS at 15:00

## 2024-08-05 RX ADMIN — LORAZEPAM 2 MG: 2 INJECTION INTRAMUSCULAR; INTRAVENOUS at 06:09

## 2024-08-05 RX ADMIN — NICOTINE POLACRILEX 2 MG: 2 LOZENGE ORAL at 13:11

## 2024-08-05 RX ADMIN — NICOTINE POLACRILEX 2 MG: 2 LOZENGE ORAL at 17:24

## 2024-08-05 RX ADMIN — NICOTINE POLACRILEX 2 MG: 2 LOZENGE ORAL at 09:29

## 2024-08-05 RX ADMIN — NAPROXEN 250 MG: 250 TABLET ORAL at 18:20

## 2024-08-05 RX ADMIN — NICOTINE POLACRILEX 2 MG: 2 LOZENGE ORAL at 21:13

## 2024-08-05 ASSESSMENT — PAIN SCALES - GENERAL
PAINLEVEL_OUTOF10: 0
PAINLEVEL_OUTOF10: 3
PAINLEVEL_OUTOF10: 10

## 2024-08-05 ASSESSMENT — PAIN - FUNCTIONAL ASSESSMENT: PAIN_FUNCTIONAL_ASSESSMENT: ACTIVITIES ARE NOT PREVENTED

## 2024-08-05 ASSESSMENT — PAIN DESCRIPTION - LOCATION
LOCATION: STERNUM
LOCATION: HEAD

## 2024-08-05 ASSESSMENT — PAIN DESCRIPTION - DESCRIPTORS: DESCRIPTORS: ACHING

## 2024-08-05 NOTE — PLAN OF CARE
[facility]  Treatment Team Note  Review Date & Time: 08/05/24  6480    Patient was not in treatment team      Status EXAM:   Mental Status and Behavioral Exam  Normal: No  Level of Assistance: Independent/Self  Facial Expression: Elevated, Exaggerated  Affect: Inappropriate  Level of Consciousness: Alert  Frequency of Checks: 4 times per hour, close  Mood:Normal: No  Mood: Anxious, Labile  Motor Activity:Normal: No  Motor Activity: Increased  Eye Contact: Good  Observed Behavior: Agitated, Impulsive, Cooperative, Friendly, Preoccupied  Sexual Misconduct History: Current - no  Preception: [place] to person, [place] to time, [place] to place  Attention:Normal: No  Attention: Distractible  Thought Processes: Flight of ideas  Thought Content:Normal: No  Thought Content: Delusions, Paranoia, Preoccupations  Depression Symptoms: Impaired concentration, Increased irritability  Anxiety Symptoms: Generalized  Kayli Symptoms: Flight of ideas, Grandiosity, Increased energy, Poor judgment  Hallucinations: None  Delusions: Yes  Delusions: Grandeur, Somatic, Paranoid  Memory:Normal: No  Memory: Confabulation  Insight and Judgment: No  Insight and Judgment: Poor judgment, Poor insight      Suicide Risk CSSR-S:  1) Within the past month, have you wished you were dead or wished you could go to sleep and not wake up? : Yes  2) Have you actually had any thoughts of killing yourself? : Yes  3) Have you been thinking about how you might kill yourself? : Yes  5) Have you started to work out or worked out the details of how to kill yourself? Do you intend to carry out this plan? : Yes  6) Have you ever done anything, started to do anything, or prepared to do anything to end your life?: Yes      PLAN/TREATMENT RECOMMENDATIONS UPDATE:   Patient will take medication as prescribed, eat 75% of meals, attend groups, participate in milieu activities, participate in treatment team and care planning for discharge and follow up.

## 2024-08-05 NOTE — PROGRESS NOTES
Patient awake and up to nurse's station, requesting a Nicotine lozenge and PRN Naprosyn for sternum pain rated 10/10. Both administered. Pt given ginger ale and snacks

## 2024-08-05 NOTE — PROGRESS NOTES
Department of Psychiatry  AttendingProgress Note  Chief Complaint: Bipolar, manic    Patient's chart was reviewed and collaborated with  about the treatment plan.    SUBJECTIVE:    Pt resting in bed, focused on discharge and restarting his valium.  Pt states he is fine, wants to leave, feels that he is 100% now.  Speech slightly pressured, euphoric at times, poor insight and judgement.      Patient is feeling better. Suicidal ideation:  denies suicidal ideation.  Patient does not have medication side effects.    ROS: Patient has new complaints: no  Sleeping adequately:  Yes   Appetite adequate: Yes  Attending groups: No:   Visitors:No    OBJECTIVE    Physical  VITALS:  /85   Pulse 83   Temp 98.1 °F (36.7 °C) (Oral)   Resp 18   Ht 1.829 m (6')   Wt 96.6 kg (213 lb)   SpO2 99%   BMI 28.89 kg/m²     Mental Status Examination:  Patients appearance was well-appearing, street clothes, and seated in bed. Thoughts are Logical. Homicidal ideations none.  No abnormal movements, tics or mannerisms.  Memory intact Aims 0. Concentration Fair.   Alert and oriented X 4. Insight and Judgement impaired insight. Patient was cooperative. Patient gait normal. Mood euthymic, affect normal affect Hallucinations Absent, suicidal ideations no specific plan to harm self Speech pressured    Data  Labs:   Admission on 08/02/2024   Component Date Value Ref Range Status    WBC 08/02/2024 8.4  4.0 - 11.0 K/uL Final    RBC 08/02/2024 4.28  4.20 - 5.90 M/uL Final    Hemoglobin 08/02/2024 13.1 (L)  13.5 - 17.5 g/dL Final    Hematocrit 08/02/2024 38.3 (L)  40.5 - 52.5 % Final    MCV 08/02/2024 89.5  80.0 - 100.0 fL Final    MCH 08/02/2024 30.7  26.0 - 34.0 pg Final    MCHC 08/02/2024 34.3  31.0 - 36.0 g/dL Final    RDW 08/02/2024 12.2 (L)  12.4 - 15.4 % Final    Platelets 08/02/2024 246  135 - 450 K/uL Final    MPV 08/02/2024 7.5  5.0 - 10.5 fL Final    Neutrophils % 08/02/2024 66.6  % Final    Lymphocytes % 08/02/2024 24.1

## 2024-08-05 NOTE — PLAN OF CARE
Problem: Anxiety  Goal: Will report anxiety at manageable levels  Description: INTERVENTIONS:  1. Administer medication as ordered  2. Teach and rehearse alternative coping skills  3. Provide emotional support with 1:1 interaction with staff  8/5/2024 0939 by Usha Lacey RN  Outcome: Progressing  Flowsheets  Taken 8/5/2024 0938  Will report anxiety at manageable levels: Teach and rehearse alternative coping skills  Taken 8/5/2024 0935  Will report anxiety at manageable levels: Teach and rehearse alternative coping skills  8/5/2024 0124 by Lilly Lazcano, RN  Outcome: Not Progressing  Flowsheets (Taken 8/4/2024 1132 by Belkis Sykes RN)  Will report anxiety at manageable levels:   Administer medication as ordered   Teach and rehearse alternative coping skills   Provide emotional support with 1:1 interaction with staff     Problem: Behavior  Goal: Pt/Family maintain appropriate behavior and adhere to behavioral management agreement, if implemented  Description: INTERVENTIONS:  1. Assess patient/family's coping skills and  non-compliant behavior (including use of illegal substances)  2. Notify security of behavior or suspected illegal substances which indicate the need for search of the family and/or belongings  3. Encourage verbalization of thoughts and concerns in a socially appropriate manner  4. Utilize positive, consistent limit setting strategies supporting safety of patient, staff and others  5. Encourage participation in the decision making process about the behavioral management agreement  6. If a visitor's behavior poses a threat to safety call refer to organization policy.  7. Initiate consult with , Psychosocial CNS, Spiritual Care as appropriate  8/5/2024 0939 by Usha Lacey RN  Outcome: Progressing  Flowsheets (Taken 8/5/2024 0935)  Patient/family maintains appropriate behavior and adheres to behavioral management agreement, if implemented: Assess patient/family’s coping

## 2024-08-05 NOTE — PLAN OF CARE
Problem: Pain  Goal: Verbalizes/displays adequate comfort level or baseline comfort level  Outcome: Progressing     Problem: Self Harm/Suicidality  Goal: Will have no self-injury during hospital stay  Description: INTERVENTIONS:  1.  Ensure constant observer at bedside with Q15M safety checks  2.  Maintain a safe environment  3.  Secure patient belongings  4.  Ensure family/visitors adhere to safety recommendations  5.  Ensure safety tray has been added to patient's diet order  6.  Every shift and PRN: Re-assess suicidal risk via Frequent Screener    8/5/2024 0124 by Lilly Lazcano, RN  Outcome: Progressing     Problem: Coping  Goal: Pt/Family able to verbalize concerns and demonstrate effective coping strategies  Description: INTERVENTIONS:  1. Assist patient/family to identify coping skills, available support systems and cultural and spiritual values  2. Provide emotional support, including active listening and acknowledgement of concerns of patient and caregivers  3. Reduce environmental stimuli, as able  4. Instruct patient/family in relaxation techniques, as appropriate  5. Assess for spiritual pain/suffering and initiate Spiritual Care, Psychosocial Clinical Specialist consults as needed  Outcome: Progressing  Flowsheets (Taken 8/4/2024 1132 by Belkis Sykes, RN)  Patient/family able to verbalize anxieties, fears, and concerns, and demonstrate effective coping: Provide emotional support, including active listening and acknowledgement of concerns of patient and caregivers     Problem: Depression/Self Harm  Goal: Effect of psychiatric condition will be minimized and patient will be protected from self harm  Description: INTERVENTIONS:  1. Assess impact of patient's symptoms on level of functioning, self care needs and offer support as indicated  2. Assess patient/family knowledge of depression, impact on illness and need for teaching  3. Provide emotional support, presence and reassurance  4. Assess for

## 2024-08-05 NOTE — PROGRESS NOTES
Patient is visible on the unit this evening. Pt up to nurse's station as soon as shift changed, asking for his HS meds and his Ativan. Pt reports to nurse that he has pain in his right hand, rated 7/10 d/t a fall he had and that his anxiety is rated at 6/10. Pt is quite friendly and flirtatious with staff, often making inappropriate comments. Back and forth from his bedroom the the nurse's station several times with many requests. States he wants to take a shower and turns water on but does not actually take a shower. Nurse attempted to administer PRN Perphenazine for pt's anxiety, but pt refused the PO medication stating that it had made his anxiety worse. Pt stated that he would only take PRN Ativan IM. Pt moaning inappropriately while Ativan was administered. PRN Nicotine lozenges administered at 2039 and 2127 before pt retired to his bedroom.   Pt believes he is an , showing nurses some \"moves.\" Refuses to wear armband because it is the \"sign of the beast.\" Denies SI/HI/AVH, no RTIS noted at this time.

## 2024-08-06 PROCEDURE — 6360000002 HC RX W HCPCS: Performed by: PSYCHIATRY & NEUROLOGY

## 2024-08-06 PROCEDURE — 6370000000 HC RX 637 (ALT 250 FOR IP)

## 2024-08-06 PROCEDURE — 1240000000 HC EMOTIONAL WELLNESS R&B

## 2024-08-06 PROCEDURE — 99233 SBSQ HOSP IP/OBS HIGH 50: CPT

## 2024-08-06 PROCEDURE — 6370000000 HC RX 637 (ALT 250 FOR IP): Performed by: PSYCHIATRY & NEUROLOGY

## 2024-08-06 RX ORDER — ACETAMINOPHEN 325 MG/1
650 TABLET ORAL EVERY 4 HOURS PRN
Status: DISCONTINUED | OUTPATIENT
Start: 2024-08-06 | End: 2024-08-09 | Stop reason: HOSPADM

## 2024-08-06 RX ORDER — HYDROXYZINE 50 MG/1
50 TABLET, FILM COATED ORAL 3 TIMES DAILY PRN
Status: DISCONTINUED | OUTPATIENT
Start: 2024-08-06 | End: 2024-08-09 | Stop reason: HOSPADM

## 2024-08-06 RX ADMIN — ACETAMINOPHEN 650 MG: 325 TABLET ORAL at 02:51

## 2024-08-06 RX ADMIN — ONDANSETRON 4 MG: 4 TABLET, ORALLY DISINTEGRATING ORAL at 16:34

## 2024-08-06 RX ADMIN — NICOTINE POLACRILEX 2 MG: 2 LOZENGE ORAL at 16:03

## 2024-08-06 RX ADMIN — NICOTINE POLACRILEX 2 MG: 2 LOZENGE ORAL at 07:12

## 2024-08-06 RX ADMIN — MAGNESIUM HYDROXIDE 30 ML: 400 SUSPENSION ORAL at 14:18

## 2024-08-06 RX ADMIN — NICOTINE POLACRILEX 2 MG: 2 LOZENGE ORAL at 10:29

## 2024-08-06 RX ADMIN — NAPROXEN 250 MG: 250 TABLET ORAL at 11:17

## 2024-08-06 RX ADMIN — NAPROXEN 250 MG: 250 TABLET ORAL at 20:25

## 2024-08-06 RX ADMIN — LORAZEPAM 2 MG: 2 INJECTION INTRAMUSCULAR; INTRAVENOUS at 08:02

## 2024-08-06 RX ADMIN — NICOTINE POLACRILEX 2 MG: 2 LOZENGE ORAL at 05:42

## 2024-08-06 RX ADMIN — NICOTINE POLACRILEX 2 MG: 2 LOZENGE ORAL at 02:33

## 2024-08-06 RX ADMIN — NICOTINE POLACRILEX 2 MG: 2 LOZENGE ORAL at 13:50

## 2024-08-06 RX ADMIN — HYDROXYZINE HYDROCHLORIDE 50 MG: 50 TABLET, FILM COATED ORAL at 23:21

## 2024-08-06 RX ADMIN — NICOTINE POLACRILEX 2 MG: 2 LOZENGE ORAL at 17:28

## 2024-08-06 RX ADMIN — HYDROXYZINE HYDROCHLORIDE 50 MG: 50 TABLET, FILM COATED ORAL at 11:19

## 2024-08-06 RX ADMIN — LITHIUM CARBONATE 450 MG: 450 TABLET ORAL at 20:27

## 2024-08-06 RX ADMIN — PERPHENAZINE 8 MG: 4 TABLET, FILM COATED ORAL at 11:19

## 2024-08-06 RX ADMIN — NICOTINE POLACRILEX 2 MG: 2 LOZENGE ORAL at 19:20

## 2024-08-06 RX ADMIN — NICOTINE POLACRILEX 2 MG: 2 LOZENGE ORAL at 08:02

## 2024-08-06 RX ADMIN — LITHIUM CARBONATE 450 MG: 450 TABLET ORAL at 08:02

## 2024-08-06 RX ADMIN — NICOTINE POLACRILEX 2 MG: 2 LOZENGE ORAL at 20:25

## 2024-08-06 RX ADMIN — NICOTINE POLACRILEX 2 MG: 2 LOZENGE ORAL at 23:20

## 2024-08-06 ASSESSMENT — PAIN DESCRIPTION - LOCATION
LOCATION: HEAD;THROAT
LOCATION: BACK
LOCATION: HIP;BACK

## 2024-08-06 ASSESSMENT — PAIN - FUNCTIONAL ASSESSMENT
PAIN_FUNCTIONAL_ASSESSMENT: PREVENTS OR INTERFERES SOME ACTIVE ACTIVITIES AND ADLS
PAIN_FUNCTIONAL_ASSESSMENT: PREVENTS OR INTERFERES SOME ACTIVE ACTIVITIES AND ADLS

## 2024-08-06 ASSESSMENT — PAIN SCALES - GENERAL
PAINLEVEL_OUTOF10: 4
PAINLEVEL_OUTOF10: 0

## 2024-08-06 ASSESSMENT — PAIN DESCRIPTION - DESCRIPTORS
DESCRIPTORS: ACHING
DESCRIPTORS: ACHING;SORE
DESCRIPTORS: ACHING

## 2024-08-06 ASSESSMENT — PAIN DESCRIPTION - ORIENTATION
ORIENTATION: ANTERIOR
ORIENTATION: LOWER;RIGHT

## 2024-08-06 NOTE — PROGRESS NOTES
Per patient PRN Atarax 50mg given for Anxiety and PRN Perphenazine 8mg given for agitation at 1120 was effective.

## 2024-08-06 NOTE — PLAN OF CARE
Problem: Anxiety  Goal: Will report anxiety at manageable levels  Description: INTERVENTIONS:  1. Administer medication as ordered  2. Teach and rehearse alternative coping skills  3. Provide emotional support with 1:1 interaction with staff  8/6/2024 0919 by Usha Lacey RN  Outcome: Progressing  Flowsheets  Taken 8/6/2024 0917  Will report anxiety at manageable levels: Teach and rehearse alternative coping skills  Taken 8/6/2024 0908  Will report anxiety at manageable levels: Teach and rehearse alternative coping skills  8/6/2024 0731 by Roxann Alva RN  Outcome: Progressing     Problem: Coping  Goal: Pt/Family able to verbalize concerns and demonstrate effective coping strategies  Description: INTERVENTIONS:  1. Assist patient/family to identify coping skills, available support systems and cultural and spiritual values  2. Provide emotional support, including active listening and acknowledgement of concerns of patient and caregivers  3. Reduce environmental stimuli, as able  4. Instruct patient/family in relaxation techniques, as appropriate  5. Assess for spiritual pain/suffering and initiate Spiritual Care, Psychosocial Clinical Specialist consults as needed  8/6/2024 0919 by Usha Lacey RN  Outcome: Progressing  Flowsheets  Taken 8/6/2024 0917  Patient/family able to verbalize anxieties, fears, and concerns, and demonstrate effective coping: Assist patient/family to identify coping skills, available support systems and cultural and spiritual values  Taken 8/6/2024 0908  Patient/family able to verbalize anxieties, fears, and concerns, and demonstrate effective coping: Assist patient/family to identify coping skills, available support systems and cultural and spiritual values  8/6/2024 0731 by Roxann Alva RN  Outcome: Progressing     Problem: Behavior  Goal: Pt/Family maintain appropriate behavior and adhere to behavioral management agreement, if implemented  Description: INTERVENTIONS:  1. Assess

## 2024-08-06 NOTE — PROGRESS NOTES
Department of Psychiatry  AttendingProgress Note  Chief Complaint: Bipolar, manic    Patient's chart was reviewed and collaborated with  about the treatment plan.    SUBJECTIVE:    Pt up on the unit today.  Mood remains elevated, intrusive, speech pressured, although improving.   Pt focused on benzos, discharge, and his headaches.  He states he needs Geodon injections for his head, chronic migraines.  Pt upset because his family wants him to stay and get help, he misses his cat.      Patient is feeling better. Suicidal ideation:  denies suicidal ideation.  Patient does not have medication side effects.    ROS: Patient has new complaints: no  Sleeping adequately:  Yes   Appetite adequate: Yes  Attending groups: No:   Visitors:No    OBJECTIVE    Physical  VITALS:  /88   Pulse 97   Temp 98.1 °F (36.7 °C) (Oral)   Resp 16   Ht 1.829 m (6')   Wt 96.6 kg (213 lb)   SpO2 99%   BMI 28.89 kg/m²     Mental Status Examination:  Patients appearance was well-appearing, street clothes, and seated in bed. Thoughts are Logical. Homicidal ideations none.  No abnormal movements, tics or mannerisms.  Memory intact Aims 0. Concentration Fair.   Alert and oriented X 4. Insight and Judgement impaired insight. Patient was cooperative. Patient gait normal. Mood euthymic, affect normal affect Hallucinations Absent, suicidal ideations no specific plan to harm self Speech pressured    Data  Labs:   Admission on 08/02/2024   Component Date Value Ref Range Status    WBC 08/02/2024 8.4  4.0 - 11.0 K/uL Final    RBC 08/02/2024 4.28  4.20 - 5.90 M/uL Final    Hemoglobin 08/02/2024 13.1 (L)  13.5 - 17.5 g/dL Final    Hematocrit 08/02/2024 38.3 (L)  40.5 - 52.5 % Final    MCV 08/02/2024 89.5  80.0 - 100.0 fL Final    MCH 08/02/2024 30.7  26.0 - 34.0 pg Final    MCHC 08/02/2024 34.3  31.0 - 36.0 g/dL Final    RDW 08/02/2024 12.2 (L)  12.4 - 15.4 % Final    Platelets 08/02/2024 246  135 - 450 K/uL Final    MPV 08/02/2024 7.5   Comment:    None Detected  Conversion factor:  100 mg/dl = .100 g/dl  For Medical Purposes Only      Amphetamine Screen, Ur 08/02/2024 Neg  Negative <1000ng/mL Final    Barbiturate Screen, Ur 08/02/2024 Neg  Negative <200 ng/mL Final    Benzodiazepine Screen, Urine 08/02/2024 Neg  Negative <200 ng/mL Final    Cannabinoid Scrn, Ur 08/02/2024 POSITIVE (A)  Negative <50 ng/mL Final    Cocaine Metabolite Screen, Urine 08/02/2024 Neg  Negative <300 ng/mL Final    Opiate Screen, Urine 08/02/2024 Neg  Negative <300 ng/mL Final    Comment: \"Therapeutic levels of pain medication, especially oxycontin and synthetic  opioids, may not be detected by this Methodology. Pain management screen  panel  Drug panel-PM-Hi Res Ur, Interp (PAIN) should be considered for drug  monitoring \".      Phencyclidine (PCP), Screen, Urine 08/02/2024 Neg  Negative <25 ng/mL Final    Methadone Screen, Urine 08/02/2024 Neg  Negative <300 ng/mL Final    Oxycodone Urine 08/02/2024 Neg  Negative <100 ng/ml Final    FENTANYL SCREEN, URINE 08/02/2024 Neg  Negative <5 ng/mL Final    pH, Urine 08/02/2024 6.5   Final    Comment: Urine pH less than 5.0 or greater than 8.0 may indicate sample adulteration.  Another sample should be collected if clinically  indicated.      Drug Screen Comment: 08/02/2024 see below   Final    Comment: This method is a screening test to detect only these drug  classes as part of a medical workup.  Confirmatory testing  by another method should be ordered if clinically indicated.      Lithium Lvl 08/05/2024 0.6  0.6 - 1.2 mmol/L Final    Lithium Dose Amount 08/05/2024 Unknown   Final            Medications  Current Facility-Administered Medications: acetaminophen (TYLENOL) tablet 650 mg, 650 mg, Oral, Q4H PRN  hydrOXYzine HCl (ATARAX) tablet 50 mg, 50 mg, Oral, TID PRN  magnesium hydroxide (MILK OF MAGNESIA) 400 MG/5ML suspension 30 mL, 30 mL, Oral, Daily PRN  camphor-menthol-methyl salicylate (BENGAY ULTRA STRENGTH) 4-10-30 %

## 2024-08-06 NOTE — PLAN OF CARE
Problem: Self Harm/Suicidality  Goal: Will have no self-injury during hospital stay  Description: INTERVENTIONS:  1.  Ensure constant observer at bedside with Q15M safety checks  2.  Maintain a safe environment  3.  Secure patient belongings  4.  Ensure family/visitors adhere to safety recommendations  5.  Ensure safety tray has been added to patient's diet order  6.  Every shift and PRN: Re-assess suicidal risk via Frequent Screener    Outcome: Progressing     Problem: Anxiety  Goal: Will report anxiety at manageable levels  Description: INTERVENTIONS:  1. Administer medication as ordered  2. Teach and rehearse alternative coping skills  3. Provide emotional support with 1:1 interaction with staff  Outcome: Progressing     Problem: Coping  Goal: Pt/Family able to verbalize concerns and demonstrate effective coping strategies  Description: INTERVENTIONS:  1. Assist patient/family to identify coping skills, available support systems and cultural and spiritual values  2. Provide emotional support, including active listening and acknowledgement of concerns of patient and caregivers  3. Reduce environmental stimuli, as able  4. Instruct patient/family in relaxation techniques, as appropriate  5. Assess for spiritual pain/suffering and initiate Spiritual Care, Psychosocial Clinical Specialist consults as needed  Outcome: Progressing     Problem: Behavior  Goal: Pt/Family maintain appropriate behavior and adhere to behavioral management agreement, if implemented  Description: INTERVENTIONS:  1. Assess patient/family's coping skills and  non-compliant behavior (including use of illegal substances)  2. Notify security of behavior or suspected illegal substances which indicate the need for search of the family and/or belongings  3. Encourage verbalization of thoughts and concerns in a socially appropriate manner  4. Utilize positive, consistent limit setting strategies supporting safety of patient, staff and others  5.

## 2024-08-06 NOTE — PLAN OF CARE
At 01:25, patient put on his call light & complained of having a headache so bad that he couldn't walk out to the team station. Patient was instructed that it was too early for him to get his ordered prn dose of naprosyn. Patient was also instructed that writer would call the on call provider. BASIL Marrero was notified at 02:32 & order for prn tylenol was received. Patient was given tylenol 650 mg po for complaint of an headache & a sore throat at 02:51, after patient came out to the team station asking for water. Patient returned to his room & bed. Roxann Alva R.N.  Problem: Pain  Goal: Verbalizes/displays adequate comfort level or baseline comfort level  Outcome: Not Progressing

## 2024-08-06 NOTE — PROGRESS NOTES
PRN Atarax 50mg given for Anxiety and PRN Perphenazine 8mg given for agitation. Will continue to monitor.

## 2024-08-07 LAB
LITHIUM DOSE: NORMAL MG
LITHIUM SERPL-MCNC: 0.7 MMOL/L (ref 0.6–1.2)

## 2024-08-07 PROCEDURE — 6370000000 HC RX 637 (ALT 250 FOR IP)

## 2024-08-07 PROCEDURE — 80178 ASSAY OF LITHIUM: CPT

## 2024-08-07 PROCEDURE — 1240000000 HC EMOTIONAL WELLNESS R&B

## 2024-08-07 PROCEDURE — 6370000000 HC RX 637 (ALT 250 FOR IP): Performed by: PSYCHIATRY & NEUROLOGY

## 2024-08-07 PROCEDURE — 36415 COLL VENOUS BLD VENIPUNCTURE: CPT

## 2024-08-07 PROCEDURE — 99233 SBSQ HOSP IP/OBS HIGH 50: CPT

## 2024-08-07 RX ORDER — M-VIT,TX,IRON,MINS/CALC/FOLIC 27MG-0.4MG
1 TABLET ORAL DAILY
Status: DISCONTINUED | OUTPATIENT
Start: 2024-08-07 | End: 2024-08-09 | Stop reason: HOSPADM

## 2024-08-07 RX ADMIN — NAPROXEN 250 MG: 250 TABLET ORAL at 09:10

## 2024-08-07 RX ADMIN — NICOTINE POLACRILEX 2 MG: 2 LOZENGE ORAL at 23:39

## 2024-08-07 RX ADMIN — NICOTINE POLACRILEX 2 MG: 2 LOZENGE ORAL at 17:04

## 2024-08-07 RX ADMIN — NICOTINE POLACRILEX 2 MG: 2 LOZENGE ORAL at 20:44

## 2024-08-07 RX ADMIN — LITHIUM CARBONATE 450 MG: 450 TABLET ORAL at 20:20

## 2024-08-07 RX ADMIN — PERPHENAZINE 8 MG: 4 TABLET, FILM COATED ORAL at 06:36

## 2024-08-07 RX ADMIN — CHOLECALCIFEROL TAB 125 MCG (5000 UNIT) 5000 UNITS: 125 TAB at 11:09

## 2024-08-07 RX ADMIN — I-VITE, TAB 1000-60-2MG (60/BT) 1 TABLET: TAB at 11:08

## 2024-08-07 RX ADMIN — NICOTINE POLACRILEX 2 MG: 2 LOZENGE ORAL at 07:27

## 2024-08-07 RX ADMIN — NAPROXEN 250 MG: 250 TABLET ORAL at 20:20

## 2024-08-07 RX ADMIN — NICOTINE POLACRILEX 2 MG: 2 LOZENGE ORAL at 09:03

## 2024-08-07 RX ADMIN — Medication: at 17:04

## 2024-08-07 RX ADMIN — NICOTINE POLACRILEX 2 MG: 2 LOZENGE ORAL at 14:39

## 2024-08-07 RX ADMIN — NICOTINE POLACRILEX 2 MG: 2 LOZENGE ORAL at 06:20

## 2024-08-07 RX ADMIN — NICOTINE POLACRILEX 2 MG: 2 LOZENGE ORAL at 19:37

## 2024-08-07 RX ADMIN — NICOTINE POLACRILEX 2 MG: 2 LOZENGE ORAL at 10:34

## 2024-08-07 RX ADMIN — PERPHENAZINE 8 MG: 4 TABLET, FILM COATED ORAL at 17:08

## 2024-08-07 RX ADMIN — HYDROXYZINE HYDROCHLORIDE 50 MG: 50 TABLET, FILM COATED ORAL at 21:15

## 2024-08-07 RX ADMIN — LITHIUM CARBONATE 450 MG: 450 TABLET ORAL at 07:27

## 2024-08-07 RX ADMIN — NICOTINE POLACRILEX 2 MG: 2 LOZENGE ORAL at 04:20

## 2024-08-07 ASSESSMENT — PAIN DESCRIPTION - DESCRIPTORS
DESCRIPTORS: SHOOTING;BURNING
DESCRIPTORS: ACHING

## 2024-08-07 ASSESSMENT — PAIN SCALES - GENERAL
PAINLEVEL_OUTOF10: 0
PAINLEVEL_OUTOF10: 10
PAINLEVEL_OUTOF10: 9

## 2024-08-07 ASSESSMENT — PAIN DESCRIPTION - ORIENTATION: ORIENTATION: LOWER;RIGHT

## 2024-08-07 ASSESSMENT — PAIN DESCRIPTION - LOCATION
LOCATION: BACK
LOCATION: BACK

## 2024-08-07 ASSESSMENT — PAIN - FUNCTIONAL ASSESSMENT: PAIN_FUNCTIONAL_ASSESSMENT: ACTIVITIES ARE NOT PREVENTED

## 2024-08-07 NOTE — PROGRESS NOTES
Department of Psychiatry  AttendingProgress Note  Chief Complaint: Bipolar, manic    Patient's chart was reviewed and collaborated with  about the treatment plan.    SUBJECTIVE:    Pt up on the unit today.  Calm today, states he is feeling better overall.  Some anxiety, focused on discharge, missing his cat and family.  Tearful at times in conversation.  Pt agreeable to try groups today.  Lithium level 0.7 today.      Patient is feeling better. Suicidal ideation:  denies suicidal ideation.  Patient does not have medication side effects.    ROS: Patient has new complaints: no  Sleeping adequately:  Yes   Appetite adequate: Yes  Attending groups: No:   Visitors:No    OBJECTIVE    Physical  VITALS:  /75   Pulse 86   Temp 97.5 °F (36.4 °C) (Oral)   Resp 16   Ht 1.829 m (6')   Wt 96.6 kg (213 lb)   SpO2 100%   BMI 28.89 kg/m²     Mental Status Examination:  Patients appearance was well-appearing, street clothes, and seated in bed. Thoughts are Logical. Homicidal ideations none.  No abnormal movements, tics or mannerisms.  Memory intact Aims 0. Concentration Fair.   Alert and oriented X 4. Insight and Judgement impaired insight. Patient was cooperative. Patient gait normal. Mood euthymic, affect normal affect Hallucinations Absent, suicidal ideations no specific plan to harm self Speech pressured    Data  Labs:   Admission on 08/02/2024   Component Date Value Ref Range Status    WBC 08/02/2024 8.4  4.0 - 11.0 K/uL Final    RBC 08/02/2024 4.28  4.20 - 5.90 M/uL Final    Hemoglobin 08/02/2024 13.1 (L)  13.5 - 17.5 g/dL Final    Hematocrit 08/02/2024 38.3 (L)  40.5 - 52.5 % Final    MCV 08/02/2024 89.5  80.0 - 100.0 fL Final    MCH 08/02/2024 30.7  26.0 - 34.0 pg Final    MCHC 08/02/2024 34.3  31.0 - 36.0 g/dL Final    RDW 08/02/2024 12.2 (L)  12.4 - 15.4 % Final    Platelets 08/02/2024 246  135 - 450 K/uL Final    MPV 08/02/2024 7.5  5.0 - 10.5 fL Final    Neutrophils % 08/02/2024 66.6  % Final

## 2024-08-07 NOTE — PROGRESS NOTES
Patient was given atarax 50 mg po per patient's request for sleep & commit lozenge 2 mg po for nicotine craving. Roxann Alva R.N.

## 2024-08-07 NOTE — PLAN OF CARE
Problem: Coping  Goal: Pt/Family able to verbalize concerns and demonstrate effective coping strategies  Description: INTERVENTIONS:  1. Assist patient/family to identify coping skills, available support systems and cultural and spiritual values  2. Provide emotional support, including active listening and acknowledgement of concerns of patient and caregivers  3. Reduce environmental stimuli, as able  4. Instruct patient/family in relaxation techniques, as appropriate  5. Assess for spiritual pain/suffering and initiate Spiritual Care, Psychosocial Clinical Specialist consults as needed  Outcome: Progressing     Problem: Behavior  Goal: Pt/Family maintain appropriate behavior and adhere to behavioral management agreement, if implemented  Description: INTERVENTIONS:  1. Assess patient/family's coping skills and  non-compliant behavior (including use of illegal substances)  2. Notify security of behavior or suspected illegal substances which indicate the need for search of the family and/or belongings  3. Encourage verbalization of thoughts and concerns in a socially appropriate manner  4. Utilize positive, consistent limit setting strategies supporting safety of patient, staff and others  5. Encourage participation in the decision making process about the behavioral management agreement  6. If a visitor's behavior poses a threat to safety call refer to organization policy.  7. Initiate consult with , Psychosocial CNS, Spiritual Care as appropriate  Outcome: Progressing     Problem: Depression/Self Harm  Goal: Effect of psychiatric condition will be minimized and patient will be protected from self harm  Description: INTERVENTIONS:  1. Assess impact of patient's symptoms on level of functioning, self care needs and offer support as indicated  2. Assess patient/family knowledge of depression, impact on illness and need for teaching  3. Provide emotional support, presence and reassurance  4. Assess for

## 2024-08-07 NOTE — PROGRESS NOTES
Patient awake at 2:35, with complaint of feeling sick. \"My stomach hurts 8/10. I feel dizzy & nauseous. Blood pressure, 113/85, pulse-80, respirations 16, & pulse ox was 97% 0n room air. Patient then complained of chest tightness, after writer stated that his vitals looked good. Then patient asked for chips & juice & was instructed that we had no chips. Patient was given 2 cups of juices & peanut butter crackers. \"I guess it is just anxiety.\" Patient appeared asleep at 03:30, with no further complaint of discomfort.\" Roxann AlvaRTripN.

## 2024-08-07 NOTE — PLAN OF CARE
Patient was given naproxen 50 mg po for complaint of right hip & lower back pain. Roxann Alva R.N.  Problem: Pain  Goal: Verbalizes/displays adequate comfort level or baseline comfort level  8/6/2024 2226 by Roxann Alva, RN  Outcome: Not Progressing  8/6/2024 0919 by Usha Lacey RN  Outcome: Progressing

## 2024-08-08 PROCEDURE — 1240000000 HC EMOTIONAL WELLNESS R&B

## 2024-08-08 PROCEDURE — 6370000000 HC RX 637 (ALT 250 FOR IP): Performed by: PSYCHIATRY & NEUROLOGY

## 2024-08-08 PROCEDURE — 99233 SBSQ HOSP IP/OBS HIGH 50: CPT | Performed by: PSYCHIATRY & NEUROLOGY

## 2024-08-08 PROCEDURE — 6370000000 HC RX 637 (ALT 250 FOR IP)

## 2024-08-08 RX ORDER — MIRTAZAPINE 15 MG/1
15 TABLET, FILM COATED ORAL ONCE
Status: COMPLETED | OUTPATIENT
Start: 2024-08-08 | End: 2024-08-08

## 2024-08-08 RX ORDER — QUETIAPINE FUMARATE 25 MG/1
25 TABLET, FILM COATED ORAL ONCE
Status: DISCONTINUED | OUTPATIENT
Start: 2024-08-08 | End: 2024-08-08

## 2024-08-08 RX ORDER — LITHIUM CARBONATE 450 MG
450 TABLET, EXTENDED RELEASE ORAL EVERY 12 HOURS SCHEDULED
Qty: 60 TABLET | Refills: 0 | Status: SHIPPED | OUTPATIENT
Start: 2024-08-08

## 2024-08-08 RX ORDER — LANOLIN ALCOHOL/MO/W.PET/CERES
10 CREAM (GRAM) TOPICAL ONCE
Status: COMPLETED | OUTPATIENT
Start: 2024-08-08 | End: 2024-08-08

## 2024-08-08 RX ADMIN — NICOTINE POLACRILEX 2 MG: 2 LOZENGE ORAL at 21:16

## 2024-08-08 RX ADMIN — CHOLECALCIFEROL TAB 125 MCG (5000 UNIT) 5000 UNITS: 125 TAB at 07:47

## 2024-08-08 RX ADMIN — LITHIUM CARBONATE 450 MG: 450 TABLET ORAL at 19:05

## 2024-08-08 RX ADMIN — NICOTINE POLACRILEX 2 MG: 2 LOZENGE ORAL at 10:06

## 2024-08-08 RX ADMIN — MIRTAZAPINE 15 MG: 15 TABLET, FILM COATED ORAL at 12:31

## 2024-08-08 RX ADMIN — NICOTINE POLACRILEX 2 MG: 2 LOZENGE ORAL at 08:58

## 2024-08-08 RX ADMIN — NICOTINE POLACRILEX 2 MG: 2 LOZENGE ORAL at 02:55

## 2024-08-08 RX ADMIN — NICOTINE POLACRILEX 2 MG: 2 LOZENGE ORAL at 05:23

## 2024-08-08 RX ADMIN — NAPROXEN 250 MG: 250 TABLET ORAL at 19:05

## 2024-08-08 RX ADMIN — HYDROXYZINE HYDROCHLORIDE 50 MG: 50 TABLET, FILM COATED ORAL at 19:29

## 2024-08-08 RX ADMIN — Medication: at 06:51

## 2024-08-08 RX ADMIN — I-VITE, TAB 1000-60-2MG (60/BT) 1 TABLET: TAB at 07:47

## 2024-08-08 RX ADMIN — HYDROXYZINE HYDROCHLORIDE 50 MG: 50 TABLET, FILM COATED ORAL at 09:06

## 2024-08-08 RX ADMIN — NICOTINE POLACRILEX 2 MG: 2 LOZENGE ORAL at 14:48

## 2024-08-08 RX ADMIN — Medication 10.5 MG: at 00:29

## 2024-08-08 RX ADMIN — LITHIUM CARBONATE 450 MG: 450 TABLET ORAL at 07:47

## 2024-08-08 RX ADMIN — PERPHENAZINE 8 MG: 4 TABLET, FILM COATED ORAL at 05:54

## 2024-08-08 RX ADMIN — NICOTINE POLACRILEX 2 MG: 2 LOZENGE ORAL at 17:17

## 2024-08-08 RX ADMIN — PERPHENAZINE 8 MG: 4 TABLET, FILM COATED ORAL at 21:16

## 2024-08-08 RX ADMIN — NICOTINE POLACRILEX 2 MG: 2 LOZENGE ORAL at 12:31

## 2024-08-08 RX ADMIN — NICOTINE POLACRILEX 2 MG: 2 LOZENGE ORAL at 19:06

## 2024-08-08 RX ADMIN — ONDANSETRON 4 MG: 4 TABLET, ORALLY DISINTEGRATING ORAL at 21:16

## 2024-08-08 RX ADMIN — NICOTINE POLACRILEX 2 MG: 2 LOZENGE ORAL at 06:51

## 2024-08-08 ASSESSMENT — PAIN SCALES - GENERAL
PAINLEVEL_OUTOF10: 10
PAINLEVEL_OUTOF10: 10
PAINLEVEL_OUTOF10: 0

## 2024-08-08 ASSESSMENT — PAIN DESCRIPTION - LOCATION
LOCATION: MOUTH
LOCATION: BACK

## 2024-08-08 ASSESSMENT — PAIN DESCRIPTION - DESCRIPTORS
DESCRIPTORS: SORE
DESCRIPTORS: TENDER;THROBBING

## 2024-08-08 ASSESSMENT — PAIN DESCRIPTION - ORIENTATION
ORIENTATION: LOWER
ORIENTATION: LOWER

## 2024-08-08 NOTE — PROGRESS NOTES
Discharge safety plan sheet in his blue folder as well as the start of his check list for discharge

## 2024-08-08 NOTE — PLAN OF CARE
Problem: Self Harm/Suicidality  Goal: Will have no self-injury during hospital stay  Description: INTERVENTIONS:  1.  Ensure constant observer at bedside with Q15M safety checks  2.  Maintain a safe environment  3.  Secure patient belongings  4.  Ensure family/visitors adhere to safety recommendations  5.  Ensure safety tray has been added to patient's diet order  6.  Every shift and PRN: Re-assess suicidal risk via Frequent Screener    8/8/2024 0818 by Michelle Garcia RN  Outcome: Progressing     Problem: Behavior  Goal: Pt/Family maintain appropriate behavior and adhere to behavioral management agreement, if implemented  Description: INTERVENTIONS:  1. Assess patient/family's coping skills and  non-compliant behavior (including use of illegal substances)  2. Notify security of behavior or suspected illegal substances which indicate the need for search of the family and/or belongings  3. Encourage verbalization of thoughts and concerns in a socially appropriate manner  4. Utilize positive, consistent limit setting strategies supporting safety of patient, staff and others  5. Encourage participation in the decision making process about the behavioral management agreement  6. If a visitor's behavior poses a threat to safety call refer to organization policy.  7. Initiate consult with , Psychosocial CNS, Spiritual Care as appropriate  8/8/2024 0818 by Michelle Garcia RN  Outcome: Progressing     Problem: Depression/Self Harm  Goal: Effect of psychiatric condition will be minimized and patient will be protected from self harm  Description: INTERVENTIONS:  1. Assess impact of patient's symptoms on level of functioning, self care needs and offer support as indicated  2. Assess patient/family knowledge of depression, impact on illness and need for teaching  3. Provide emotional support, presence and reassurance  4. Assess for possible suicidal thoughts or ideation. If patient expresses suicidal thoughts or

## 2024-08-08 NOTE — GROUP NOTE
Group Therapy Note    Date: 8/8/2024    Group Start Time: 1000  Group End Time: 1045  Group Topic: Psychoeducation    [facility]    Lilly Curry, RT        Group Therapy Note    Attendees: 7    Group topic was focused on Self-Care including 5 main categories: Emotional, Physical, Social, Professional, and Spiritual.  Participants completed a Self-Care Checkup to determine how each of the five areas are going in their life and ways they could influence this.  Group processed the relationship between social connection and mental well-being.      Notes:  Pt was present and interactive while in group.  Monopolizing and required redirection to help focus AEB side conversations.  Minimally participated in activity due to being preoccupied.  Shared input though off topic at times.    Status After Intervention:  Unchanged    Participation Level: Interactive and Monopolizing    Participation Quality: Sharing      Speech:  normal      Thought Process/Content: Linear      Affective Functioning: Exaggerated      Mood: elevated      Level of consciousness:  Alert and Preoccupied      Response to Learning: Able to verbalize current knowledge/experience      Endings: None Reported    Modes of Intervention: Education, Support, Socialization, Exploration, Clarifying, Problem-solving, and Activity      Discipline Responsible: Psychoeducational Specialist and Recreational Therapist      Signature:  Lilly Curry MA, CTRS

## 2024-08-08 NOTE — GROUP NOTE
Group Therapy Note    Date: 2024    Group Start Time:   Group End Time:   Group Topic: Wrap-Up    [facility]    Keshav Valdez RN        Group Therapy Note    Attendees: 7         Patient's Goal:  ***    Notes:  ***    Status After Intervention:  {Status After Intervention:803090012}    Participation Level: {Participation Level:086617657}    Participation Quality: {[facility] PARTICIPATION QUALITY:340648272}      Speech:  {ED  CD_SPEECH:24336}      Thought Process/Content: {Thought Process/Content:790908280}      Affective Functioning: {Affective Functionin}      Mood: {Mood:564932300}      Level of consciousness:  {Level of consciousness:217534501}      Response to Learning: {[facility] Responses to Learnin}      Endings: {[facility] Endings:08177}    Modes of Intervention: {[facility] BHI Modes of Intervention:159043586}      Discipline Responsible: {[facility] Multidisciplinary:263217663}      Signature:  Keshav Valdez RN

## 2024-08-08 NOTE — PROGRESS NOTES
Shift assessment complete, scheduled meds given. Pt denies all at this time. Could be seen on the small side day room, brightened and excitedly talking about discharging tomorrow. Pt denies any further needs at this time.

## 2024-08-08 NOTE — PLAN OF CARE
Pt A/Ox4, has been visible on the unit socializing with select peers, talking on the phone with his mom, and attended some of evening group. Pt appears manic- hyperverbal with FOI but cooperative and friendly. Pt was compliant with medications and ate a lot of snacks. Pt stated he is happy and is looking forward to going home Friday. Pt denied SI/HI/AVH.      PRN nicotine lozenge given at 2044, 2339, 0255, 0523, 0651    PRN naproxen given for lower R back pain (10 on 0-10 scale) at 2020 with effectiveness.    PRN atarax given at 2115 for anxiety with effectiveness.     PRN melatonin given at 0029 with effectiveness.    PRN perphenazine given at 0554 with effectiveness.    PRN Orajel given at 0651.       Problem: Kayli  Goal: Will exhibit normal sleep and speech and no impulsivity  Description: INTERVENTIONS:  1. Administer medication as ordered  2. Set limits on impulsive behavior  3. Make attempts to decrease external stimuli as possible  Outcome: Not Progressing     Problem: Pain  Goal: Verbalizes/displays adequate comfort level or baseline comfort level  Outcome: Progressing     Problem: Self Harm/Suicidality  Goal: Will have no self-injury during hospital stay  Description: INTERVENTIONS:  1.  Ensure constant observer at bedside with Q15M safety checks  2.  Maintain a safe environment  3.  Secure patient belongings  4.  Ensure family/visitors adhere to safety recommendations  5.  Ensure safety tray has been added to patient's diet order  6.  Every shift and PRN: Re-assess suicidal risk via Frequent Screener  Outcome: Progressing     Problem: Anxiety  Goal: Will report anxiety at manageable levels  Description: INTERVENTIONS:  1. Administer medication as ordered  2. Teach and rehearse alternative coping skills  3. Provide emotional support with 1:1 interaction with staff  Outcome: Progressing     Problem: Coping  Goal: Pt/Family able to verbalize concerns and demonstrate effective coping strategies  Description:  INTERVENTIONS:  1. Assist patient/family to identify coping skills, available support systems and cultural and spiritual values  2. Provide emotional support, including active listening and acknowledgement of concerns of patient and caregivers  3. Reduce environmental stimuli, as able  4. Instruct patient/family in relaxation techniques, as appropriate  5. Assess for spiritual pain/suffering and initiate Spiritual Care, Psychosocial Clinical Specialist consults as needed  Outcome: Progressing     Problem: Behavior  Goal: Pt/Family maintain appropriate behavior and adhere to behavioral management agreement, if implemented  Description: INTERVENTIONS:  1. Assess patient/family's coping skills and  non-compliant behavior (including use of illegal substances)  2. Notify security of behavior or suspected illegal substances which indicate the need for search of the family and/or belongings  3. Encourage verbalization of thoughts and concerns in a socially appropriate manner  4. Utilize positive, consistent limit setting strategies supporting safety of patient, staff and others  5. Encourage participation in the decision making process about the behavioral management agreement  6. If a visitor's behavior poses a threat to safety call refer to organization policy.  7. Initiate consult with , Psychosocial CNS, Spiritual Care as appropriate  Outcome: Progressing     Problem: Depression/Self Harm  Goal: Effect of psychiatric condition will be minimized and patient will be protected from self harm  Description: INTERVENTIONS:  1. Assess impact of patient's symptoms on level of functioning, self care needs and offer support as indicated  2. Assess patient/family knowledge of depression, impact on illness and need for teaching  3. Provide emotional support, presence and reassurance  4. Assess for possible suicidal thoughts or ideation. If patient expresses suicidal thoughts or statements do not leave alone, initiate

## 2024-08-08 NOTE — PROGRESS NOTES
Department of Psychiatry  AttendingProgress Note  Chief Complaint: toni Austin appears stable. He stated taht he needs Ativan or Valium for his anxiety which has been a recurret issue. He typically wants extra meds IM.   I offered remeron po.   He is scheduled to leave tomorrow and [facility] [place] [facility]   Patient's chart was reviewed and collaborated with  about the treatment plan.  SUBJECTIVE:    Patient is feeling better. Suicidal ideation:  denies suicidal ideation.  Patient does not have medication side effects.    ROS: Patient has new complaints: no  Sleeping adequately:  Yes   Appetite adequate: Yes  Attending groups: Yes  Visitors:No    OBJECTIVE    Physical  VITALS:  /84   Pulse 73   Temp 97.7 °F (36.5 °C) (Oral)   Resp 16   Ht 1.829 m (6')   Wt 96.6 kg (213 lb)   SpO2 98%   BMI 28.89 kg/m²     Mental Status Examination:  Patients appearance was street clothes. Thoughts are Goal directed. Homicidal ideations none.  No abnormal movements, tics or mannerisms.  Memory intact Aims 0. Concentration Good.   Alert and oriented X 4. Insight and Judgement impaired insight. Patient was cooperative. Patient gait normal. Mood irritable and labile, affect labile affect Hallucinations Absent, suicidal ideations no specific plan to harm self Speech normal volume  Data  Labs:   Admission on 08/02/2024   Component Date Value Ref Range Status    WBC 08/02/2024 8.4  4.0 - 11.0 K/uL Final    RBC 08/02/2024 4.28  4.20 - 5.90 M/uL Final    Hemoglobin 08/02/2024 13.1 (L)  13.5 - 17.5 g/dL Final    Hematocrit 08/02/2024 38.3 (L)  40.5 - 52.5 % Final    MCV 08/02/2024 89.5  80.0 - 100.0 fL Final    MCH 08/02/2024 30.7  26.0 - 34.0 pg Final    MCHC 08/02/2024 34.3  31.0 - 36.0 g/dL Final    RDW 08/02/2024 12.2 (L)  12.4 - 15.4 % Final    Platelets 08/02/2024 246  135 - 450 K/uL Final    MPV 08/02/2024 7.5  5.0 - 10.5 fL Final    Neutrophils % 08/02/2024 66.6  % Final     650 mg, Oral, Q4H PRN  hydrOXYzine HCl (ATARAX) tablet 50 mg, 50 mg, Oral, TID PRN  magnesium hydroxide (MILK OF MAGNESIA) 400 MG/5ML suspension 30 mL, 30 mL, Oral, Daily PRN  camphor-menthol-methyl salicylate (BENGAY ULTRA STRENGTH) 4-10-30 % cream, , Apply externally, 4x Daily PRN  naproxen (NAPROSYN) tablet 250 mg, 250 mg, Oral, BID PRN  perphenazine tablet 8 mg, 8 mg, Oral, TID PRN  nicotine polacrilex (COMMIT) lozenge 2 mg, 2 mg, Oral, Q1H PRN  lithium (ESKALITH) extended release tablet 450 mg, 450 mg, Oral, 2 times per day  nicotine (NICODERM CQ) 21 MG/24HR 1 patch, 1 patch, TransDERmal, Daily  ondansetron (ZOFRAN-ODT) disintegrating tablet 4 mg, 4 mg, Oral, Q8H PRN    ASSESSMENT AND PLAN    Principal Problem:    Bipolar disorder, most recent episode manic (HCC)  Active Problems:    Autistic spectrum disorder    Cannabis use disorder  Resolved Problems:    * No resolved hospital problems. *       1.Patient s symptoms   are improving  2.Probable discharge is tomorrow  3.Discharge planning is complete  4. Suicidal ideation is  none  5. Total time with patient was 50 minutes and more than 50 % of that time was spent counseling the patient on their symptoms, treatment and expected goals.        Pramod Calvert MD  Physician Psychiatry

## 2024-08-08 NOTE — PLAN OF CARE
[facility]  Treatment Team Note  Review Date & Time: 08/08/24  0929    Patient was not in treatment team      Status EXAM:   Mental Status and Behavioral Exam  Normal: No  Level of Assistance: Independent/Self  Facial Expression: Exaggerated, Elevated  Affect: Blunt  Level of Consciousness: Alert  Frequency of Checks: 4 times per hour, close  Mood:Normal: No  Mood: Anxious  Motor Activity:Normal: Yes  Motor Activity: Other (comment) (wnl)  Eye Contact: Good  Observed Behavior: Cooperative, Friendly  Sexual Misconduct History: Current - no  Preception: [place] to person, [place] to time, [place] to place, [place] to situation  Attention:Normal: No  Attention: Distractible  Thought Processes: Flight of ideas  Thought Content:Normal: No  Thought Content: Preoccupations  Depression Symptoms: Impaired concentration  Anxiety Symptoms: Generalized  Kayli Symptoms: Poor judgment, Flight of ideas  Hallucinations: None (pt currenty denies)  Delusions: No  Delusions: Grandeur  Memory:Normal: No  Memory: Confabulation  Insight and Judgment: No  Insight and Judgment: Poor judgment, Poor insight      Suicide Risk CSSR-S:  1) Within the past month, have you wished you were dead or wished you could go to sleep and not wake up? : Yes  2) Have you actually had any thoughts of killing yourself? : Yes  3) Have you been thinking about how you might kill yourself? : Yes  5) Have you started to work out or worked out the details of how to kill yourself? Do you intend to carry out this plan? : Yes  6) Have you ever done anything, started to do anything, or prepared to do anything to end your life?: Yes      PLAN/TREATMENT RECOMMENDATIONS UPDATE:   Patient will take medication as prescribed, eat 75% of meals, attend groups, participate in milieu activities, participate in treatment team and care planning for discharge and follow up.           Sondra Hollis RN

## 2024-08-08 NOTE — PROGRESS NOTES
Prn lozenge given per pt request followed by PRN atarax per pt request for complaints of anxiety.

## 2024-08-09 ENCOUNTER — PATIENT MESSAGE (OUTPATIENT)
Dept: FAMILY MEDICINE CLINIC | Age: 34
End: 2024-08-09

## 2024-08-09 VITALS
SYSTOLIC BLOOD PRESSURE: 124 MMHG | TEMPERATURE: 97.4 F | RESPIRATION RATE: 18 BRPM | HEIGHT: 72 IN | HEART RATE: 90 BPM | DIASTOLIC BLOOD PRESSURE: 78 MMHG | OXYGEN SATURATION: 98 % | BODY MASS INDEX: 28.85 KG/M2 | WEIGHT: 213 LBS

## 2024-08-09 PROCEDURE — 99239 HOSP IP/OBS DSCHRG MGMT >30: CPT

## 2024-08-09 PROCEDURE — 6370000000 HC RX 637 (ALT 250 FOR IP)

## 2024-08-09 PROCEDURE — 6370000000 HC RX 637 (ALT 250 FOR IP): Performed by: NURSE PRACTITIONER

## 2024-08-09 PROCEDURE — 5130000000 HC BRIDGE APPOINTMENT

## 2024-08-09 PROCEDURE — 6370000000 HC RX 637 (ALT 250 FOR IP): Performed by: PSYCHIATRY & NEUROLOGY

## 2024-08-09 RX ORDER — MECOBALAMIN 5000 MCG
10 TABLET,DISINTEGRATING ORAL ONCE
Status: COMPLETED | OUTPATIENT
Start: 2024-08-09 | End: 2024-08-09

## 2024-08-09 RX ADMIN — NICOTINE POLACRILEX 2 MG: 2 LOZENGE ORAL at 08:29

## 2024-08-09 RX ADMIN — Medication 10 MG: at 02:15

## 2024-08-09 RX ADMIN — Medication: at 00:27

## 2024-08-09 RX ADMIN — NICOTINE POLACRILEX 2 MG: 2 LOZENGE ORAL at 04:21

## 2024-08-09 RX ADMIN — LITHIUM CARBONATE 450 MG: 450 TABLET ORAL at 08:29

## 2024-08-09 RX ADMIN — NICOTINE POLACRILEX 2 MG: 2 LOZENGE ORAL at 07:03

## 2024-08-09 RX ADMIN — CHOLECALCIFEROL TAB 125 MCG (5000 UNIT) 5000 UNITS: 125 TAB at 08:30

## 2024-08-09 RX ADMIN — I-VITE, TAB 1000-60-2MG (60/BT) 1 TABLET: TAB at 08:29

## 2024-08-09 RX ADMIN — NICOTINE POLACRILEX 2 MG: 2 LOZENGE ORAL at 00:10

## 2024-08-09 ASSESSMENT — PAIN DESCRIPTION - DESCRIPTORS: DESCRIPTORS: ACHING

## 2024-08-09 ASSESSMENT — PAIN SCALES - GENERAL: PAINLEVEL_OUTOF10: 7

## 2024-08-09 ASSESSMENT — PAIN - FUNCTIONAL ASSESSMENT: PAIN_FUNCTIONAL_ASSESSMENT: ACTIVITIES ARE NOT PREVENTED

## 2024-08-09 ASSESSMENT — PAIN DESCRIPTION - ORIENTATION: ORIENTATION: LOWER

## 2024-08-09 ASSESSMENT — PAIN DESCRIPTION - LOCATION: LOCATION: BACK

## 2024-08-09 ASSESSMENT — PAIN DESCRIPTION - PAIN TYPE: TYPE: ACUTE PAIN

## 2024-08-09 NOTE — DISCHARGE SUMMARY
Discharge Summary    Admit Date: 8/2/2024   Discharge Date:  8/9/2024 8/9/2024    Final Dx: Bipolar disorder, most recent episode manic (HCC)     At Discharge: 51-60 moderate symptoms   All conditions and active problems were treated while patient was hospitalized.     Condition on DC  Mood and affect are stable and pt is not suicidal     VITALS:  /78   Pulse 90   Temp 97.4 °F (36.3 °C) (Temporal)   Resp 18   Ht 1.829 m (6')   Wt 96.6 kg (213 lb)   SpO2 98%   BMI 28.89 kg/m²     Brief Summary Present Illness    This is a domiciled, , and psychiatrically disabled 34-year-old with a history of bipolar disorder, who presented to our emergency department with ongoing manic symptoms and making suicidal statements.  The patient was discharged from our program on the 9th of last month after being placed on Aristada.  He says that a day later, he went to [facility] with ongoing symptoms and was transferred to [facility] in [place].  He was there for 9 days and put on oral Abilify as well; 30 mg daily.  He says he was also started on Valium as needed.  He was discharged and continued with symptoms.  He says that he began thinking of suicide, and so presented to our emergency department again yesterday.   He presents pressured, severely intrusive, elevated, and paranoid.  Although he does not endorse suicidal ideation with me, he is clearly impaired and unable to function.  He reports taking Abilify and Valium as prescribed since discharged from [facility], but does not want to continue taking them.  We discussed treatment options at length, and ultimately, he agreed to a traditional mood stabilizer, namely lithium.  He says he took it once years ago and is willing to try it again.  Although impaired, he has demonstrated safe behavior since admission here.    Pt was treated and stabilized on BHI.  Pt agreeable to start on Lithium to help with mood stabilization.  Pts  Screen, Urine 08/02/2024 Neg  Negative <25 ng/mL Final    Methadone Screen, Urine 08/02/2024 Neg  Negative <300 ng/mL Final    Oxycodone Urine 08/02/2024 Neg  Negative <100 ng/ml Final    FENTANYL SCREEN, URINE 08/02/2024 Neg  Negative <5 ng/mL Final    pH, Urine 08/02/2024 6.5   Final    Comment: Urine pH less than 5.0 or greater than 8.0 may indicate sample adulteration.  Another sample should be collected if clinically  indicated.      Drug Screen Comment: 08/02/2024 see below   Final    Comment: This method is a screening test to detect only these drug  classes as part of a medical workup.  Confirmatory testing  by another method should be ordered if clinically indicated.      Lithium Lvl 08/05/2024 0.6  0.6 - 1.2 mmol/L Final    Lithium Dose Amount 08/05/2024 Unknown   Final    Lithium Lvl 08/07/2024 0.7  0.6 - 1.2 mmol/L Final    Lithium Dose Amount 08/07/2024 Unknown   Final          Mental Status Exam at Discharge:  Level of consciousness:  awake  Appearance:  well-appearing, in chair, good grooming and good hygiene well-developed, well-nourished  Behavior/Motor:  no abnormalities noted normal gait and station AIMS: 0  Attitude toward examiner:  cooperative, attentive and good eye contact  Speech:  spontaneous, normal rate, normal volume and well articulated  Mood:  dysthymic  Affect:  mood congruent Anxiety: mild  Hallucinations: Absent  Thought processes:  coherent Attention span, Concentration & Attention:  attention span and concentration were age appropriate  Thought content:  No evidence of delusions OCD: none    Insight: normal insight and judgment Cognition:  oriented to person, place, and time  Fund of Knowledge: average  IQ:average Memory: intact  Suicide:  No specific plan to harm self  Sleep: sleeps through the night  Appetite: ok     Reassess Suicide Risk:  no specific plan to harm self Pt has phone numbers to contact if suicidal thoughts recur and states pt will return to the hospital if

## 2024-08-09 NOTE — PROGRESS NOTES
[facility]  Discharge Note    Pt discharged with followings belongings:   Dental Appliances: Uppers, Lowers  Vision - Corrective Lenses: None  Hearing Aid: None  Jewelry: Bracelet  Body Piercings Removed: Yes  Clothing: Shirt, Shorts, Pants (7 shirts, jeans, swaet pants,2 pr shorts, 3 books)  Other Valuables: Wallet   Valuables sent home with yes or returned to patient. Patient educated on aftercare instructions: yes .Patient verbalize understanding of AVS:  Yes.    Status EXAM upon discharge:  Mental Status and Behavioral Exam  Normal: Yes  Level of Assistance: Independent/Self  Facial Expression: Brightened  Affect: Appropriate  Level of Consciousness: Alert  Frequency of Checks: 4 times per hour, close  Mood:Normal: No  Mood: Anxious  Motor Activity:Normal: Yes  Motor Activity: Other (comment) (wnl)  Eye Contact: Good  Observed Behavior: Cooperative  Sexual Misconduct History: Current - no  Preception: [place] to person, [place] to time, [place] to place, [place] to situation  Attention:Normal: Yes  Attention: Distractible  Thought Processes: Flight of ideas  Thought Content:Normal: No  Thought Content: Preoccupations (wants to go home now)  Depression Symptoms: Impaired concentration  Anxiety Symptoms: Generalized  Kayli Symptoms: Poor judgment  Hallucinations: None  Delusions: No  Delusions: Grandeur  Memory:Normal: No  Memory: Confabulation  Insight and Judgment: No  Insight and Judgment: Poor judgment    Tobacco Screening:  Practical Counseling, on admission, benedicto X, if applicable and completed (first 3 are required if patient doesn't refuse):            ( ) Recognizing danger situations (included triggers and roadblocks)                    ( ) Coping skills (new ways to manage stress,relaxation techniques, changing routine, distraction)                                                           ( ) Basic information about quitting (benefits of quitting, techniques in how to quit, available

## 2024-08-09 NOTE — PROGRESS NOTES
Bridge Appointment completed: Reviewed Discharge Instructions with patient.    Patient verbalizes understanding and agreement with the discharge plan using the teachback method.     Referral for Outpatient Tobacco Cessation Counseling, upon discharge (benedicto X if applicable and completed):    ( )  Hospital staff assisted patient to call Quit Line or faxed referral                                   during hospitalization                  ( )  Recognizing danger situations (included triggers and roadblocks), if not completed on admission                    ( )  Coping skills (new ways to manage stress, exercise, relaxation techniques, changing routine, distraction), if not completed on admission                                                           ( )  Basic information about quitting (benefits of quitting, techniques in how to quit, available resources, if not completed on admission  ( ) [facility]   ( X) Patient refused referral  ( X) Patient refused counseling  ( X) Patient refused smoking cessation medication upon discharge    Vaccinations (benedicto X if applicable and completed):  ( ) Patient states already received influenza vaccine elsewhere  ( ) Patient received influenza vaccine during this hospitalization  ( ) Patient refused influenza vaccine at this time  ( X) Not offered

## 2024-08-09 NOTE — PLAN OF CARE
Problem: Kayli  Goal: Will exhibit normal sleep and speech and no impulsivity  Description: INTERVENTIONS:  1. Administer medication as ordered  2. Set limits on impulsive behavior  3. Make attempts to decrease external stimuli as possible  8/8/2024 2323 by Edie Davies LPN  Outcome: Not Progressing   Pt out this morning excited and ready for discharge, denies SI,HI,AVH, no distress noted.    Refill sent,schedule OV
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
## 2024-08-09 NOTE — PLAN OF CARE
pt has been visible on the unit socializing with peers, talking on the phone with his mom. Pt appears manic- hyperverbal with staff and peers but cooperative and will be agitated then be friendly a minute later. Pt was compliant with medications and ate a lot of snacks. Pt stated he is happy and is looking forward to going home. Pt is very focused on staff giving him approval he came out of his room at 2330 stating that we were trying to sabotage him from going home, I told him no that is not true we want you to go home and feel better. Pt went back to room and slammed door. Pt denied SI/HI/AVH. Prns given: naproxen given for lower R back pain (10 on 0-10 scale) at 1905 with effectiveness atarax given at 1929 for anxiety with no effectiveness. perphenazine given at 2116 with no effectiveness. Zofran given at 2116 effectiveness.        Problem: Pain  Goal: Verbalizes/displays adequate comfort level or baseline comfort level  Outcome: Progressing     Problem: Self Harm/Suicidality  Goal: Will have no self-injury during hospital stay  Description: INTERVENTIONS:  1.  Ensure constant observer at bedside with Q15M safety checks  2.  Maintain a safe environment  3.  Secure patient belongings  4.  Ensure family/visitors adhere to safety recommendations  5.  Ensure safety tray has been added to patient's diet order  6.  Every shift and PRN: Re-assess suicidal risk via Frequent Screener    Outcome: Progressing     Problem: Anxiety  Goal: Will report anxiety at manageable levels  Description: INTERVENTIONS:  1. Administer medication as ordered  2. Teach and rehearse alternative coping skills  3. Provide emotional support with 1:1 interaction with staff  Outcome: Progressing     Problem: Coping  Goal: Pt/Family able to verbalize concerns and demonstrate effective coping strategies  Description: INTERVENTIONS:  1. Assist patient/family to identify coping skills, available support systems and cultural and spiritual values  2.  Health Professional, Spiritual Care, Psychosocial CNS, and consider a recommendation to the LIP for a Psychiatric Consultation  Outcome: Progressing     Problem: Psychosis  Goal: Will report no hallucinations or delusions  Description: INTERVENTIONS:  1. Administer medication as  ordered  2. Assist with reality testing to support increasing orientation  3. Assess if patient's hallucinations or delusions are encouraging self harm or harm to others and intervene as appropriate  Outcome: Progressing     Problem: Safety - Adult  Goal: Free from fall injury  Outcome: Progressing     Problem: Kayli  Goal: Will exhibit normal sleep and speech and no impulsivity  Description: INTERVENTIONS:  1. Administer medication as ordered  2. Set limits on impulsive behavior  3. Make attempts to decrease external stimuli as possible  Outcome: Not Progressing

## 2024-08-28 ENCOUNTER — HOSPITAL ENCOUNTER (OUTPATIENT)
Age: 34
Discharge: HOME | End: 2024-08-28
Payer: MEDICAID

## 2024-08-28 ENCOUNTER — OFFICE VISIT (OUTPATIENT)
Dept: FAMILY MEDICINE CLINIC | Age: 34
End: 2024-08-28
Payer: MEDICAID

## 2024-08-28 VITALS
BODY MASS INDEX: 29.12 KG/M2 | DIASTOLIC BLOOD PRESSURE: 70 MMHG | SYSTOLIC BLOOD PRESSURE: 118 MMHG | HEIGHT: 72 IN | HEART RATE: 66 BPM | OXYGEN SATURATION: 98 % | WEIGHT: 215 LBS

## 2024-08-28 DIAGNOSIS — F31.10 BIPOLAR DISORDER, MOST RECENT EPISODE MANIC (HCC): Primary | ICD-10-CM

## 2024-08-28 DIAGNOSIS — Z09 HOSPITAL DISCHARGE FOLLOW-UP: ICD-10-CM

## 2024-08-28 LAB
ALBUMIN SERPL-MCNC: 3.8 G/DL (ref 3.4–5)
ALBUMIN/GLOB SERPL: 1.3 {RATIO} (ref 1.1–2.2)
ALP SERPL-CCNC: 65 U/L (ref 40–129)
ALT SERPL-CCNC: 26 U/L (ref 10–40)
ANION GAP SERPL CALCULATED.3IONS-SCNC: 10 MMOL/L (ref 3–16)
AST SERPL-CCNC: 29 U/L (ref 15–37)
BASOPHILS # BLD: 0.1 K/UL (ref 0–0.2)
BASOPHILS NFR BLD: 1.2 %
BILIRUB SERPL-MCNC: 0.8 MG/DL (ref 0–1)
BUN SERPL-MCNC: 12 MG/DL (ref 7–20)
CALCIUM SERPL-MCNC: 9.1 MG/DL (ref 8.3–10.6)
CHLORIDE SERPL-SCNC: 103 MMOL/L (ref 99–110)
CO2 SERPL-SCNC: 27 MMOL/L (ref 21–32)
CREAT SERPL-MCNC: 0.7 MG/DL (ref 0.9–1.3)
DEPRECATED RDW RBC AUTO: 12.6 % (ref 12.4–15.4)
EOSINOPHIL # BLD: 0.5 K/UL (ref 0–0.6)
EOSINOPHIL NFR BLD: 6.3 %
GFR SERPLBLD CREATININE-BSD FMLA CKD-EPI: >90 ML/MIN/{1.73_M2}
GLUCOSE SERPL-MCNC: 83 MG/DL (ref 70–99)
HCT VFR BLD AUTO: 39.1 % (ref 40.5–52.5)
HGB BLD-MCNC: 12.8 G/DL (ref 13.5–17.5)
LITHIUM DOSE: ABNORMAL MG
LITHIUM SERPL-MCNC: 0.5 MMOL/L (ref 0.6–1.2)
LYMPHOCYTES # BLD: 2.3 K/UL (ref 1–5.1)
LYMPHOCYTES NFR BLD: 31.2 %
MCH RBC QN AUTO: 30.2 PG (ref 26–34)
MCHC RBC AUTO-ENTMCNC: 32.7 G/DL (ref 31–36)
MCV RBC AUTO: 92.4 FL (ref 80–100)
MONOCYTES # BLD: 0.7 K/UL (ref 0–1.3)
MONOCYTES NFR BLD: 9.3 %
NEUTROPHILS # BLD: 3.8 K/UL (ref 1.7–7.7)
NEUTROPHILS NFR BLD: 52 %
PLATELET # BLD AUTO: 255 K/UL (ref 135–450)
PMV BLD AUTO: 7.4 FL (ref 5–10.5)
POTASSIUM SERPL-SCNC: 4.5 MMOL/L (ref 3.5–5.1)
PROT SERPL-MCNC: 6.7 G/DL (ref 6.4–8.2)
RBC # BLD AUTO: 4.23 M/UL (ref 4.2–5.9)
SODIUM SERPL-SCNC: 140 MMOL/L (ref 136–145)
WBC # BLD AUTO: 7.3 K/UL (ref 4–11)

## 2024-08-28 PROCEDURE — 99213 OFFICE O/P EST LOW 20 MIN: CPT | Performed by: NURSE PRACTITIONER

## 2024-08-28 PROCEDURE — 83036 HEMOGLOBIN GLYCOSYLATED A1C: CPT

## 2024-08-28 PROCEDURE — 80053 COMPREHEN METABOLIC PANEL: CPT

## 2024-08-28 PROCEDURE — 1111F DSCHRG MED/CURRENT MED MERGE: CPT | Performed by: NURSE PRACTITIONER

## 2024-08-28 PROCEDURE — 85025 COMPLETE CBC W/AUTO DIFF WBC: CPT

## 2024-08-28 PROCEDURE — 84439 ASSAY OF FREE THYROXINE: CPT

## 2024-08-28 PROCEDURE — 84443 ASSAY THYROID STIM HORMONE: CPT

## 2024-08-28 PROCEDURE — 84481 FREE ASSAY (FT-3): CPT

## 2024-08-28 PROCEDURE — 80178 ASSAY OF LITHIUM: CPT

## 2024-08-28 RX ORDER — MIRTAZAPINE 7.5 MG/1
7.5 TABLET, FILM COATED ORAL NIGHTLY
COMMUNITY

## 2024-08-29 LAB
EST. AVERAGE GLUCOSE BLD GHB EST-MCNC: 88.2 MG/DL
HBA1C MFR BLD: 4.7 %
T3FREE SERPL-MCNC: 2.6 PG/ML (ref 2.3–4.2)
T4 FREE SERPL-MCNC: 1.2 NG/DL (ref 0.9–1.8)
TSH SERPL DL<=0.005 MIU/L-ACNC: 0.87 UIU/ML (ref 0.27–4.2)

## 2024-08-30 ENCOUNTER — TELEPHONE (OUTPATIENT)
Dept: PULMONOLOGY | Age: 34
End: 2024-08-30

## 2024-08-30 NOTE — TELEPHONE ENCOUNTER
Patient did not show for vv  with rohan thacker on 8/30/24.    Reason:  unknown    This is patient's first no show.  Patient was ano show on: n/a    Patient did not reschedule.  Reschedule date:  n/a

## 2024-12-10 ENCOUNTER — TELEPHONE (OUTPATIENT)
Dept: PULMONOLOGY | Age: 34
End: 2024-12-10

## 2024-12-10 NOTE — TELEPHONE ENCOUNTER
Patient did not show for New Patient   with Dr. Dubon on 12/10/24.    Reason:  No show    This is patient's first no show.  Patient was ano show on: 12/10/24.      Patient did not reschedule.  Reschedule date:  n/a     Call Pt, he answered and then we got disconnected.

## 2025-05-19 ENCOUNTER — TELEPHONE (OUTPATIENT)
Dept: ADMINISTRATIVE | Age: 35
End: 2025-05-19

## 2025-05-19 NOTE — TELEPHONE ENCOUNTER
Submitted PA for Rexulti 0.25MG tablets  Via CMM Key: [id number] STATUS: not sent     Request received thru cm, I do not see a active script for this medication. If this is needed will need a active script placed in epic to start pa request.     Please advise .    If this requires a response please respond to the pool ( P [facility] MEDICATION PRE-AUTH).      Thank you please advise patient.

## 2025-05-19 NOTE — TELEPHONE ENCOUNTER
I do not prescribe this for the patient.  He currently sees psychiatry and I assume that is who has prescribed this medication.